# Patient Record
Sex: FEMALE | Race: WHITE | NOT HISPANIC OR LATINO | Employment: UNEMPLOYED | URBAN - METROPOLITAN AREA
[De-identification: names, ages, dates, MRNs, and addresses within clinical notes are randomized per-mention and may not be internally consistent; named-entity substitution may affect disease eponyms.]

---
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
## 2019-03-21 ENCOUNTER — OFFICE VISIT (OUTPATIENT)
Dept: OBSTETRICS AND GYNECOLOGY | Facility: CLINIC | Age: 19
End: 2019-03-21
Payer: COMMERCIAL

## 2019-03-21 VITALS
HEIGHT: 64 IN | WEIGHT: 200.38 LBS | SYSTOLIC BLOOD PRESSURE: 122 MMHG | DIASTOLIC BLOOD PRESSURE: 68 MMHG | BODY MASS INDEX: 34.21 KG/M2

## 2019-03-21 DIAGNOSIS — Z30.09 ENCOUNTER FOR GENERAL COUNSELING AND ADVICE ON CONTRACEPTIVE MANAGEMENT: Primary | ICD-10-CM

## 2019-03-21 PROCEDURE — 3008F BODY MASS INDEX DOCD: CPT | Mod: CPTII,S$GLB,, | Performed by: SPECIALIST

## 2019-03-21 PROCEDURE — 99213 OFFICE O/P EST LOW 20 MIN: CPT | Mod: S$GLB,,, | Performed by: SPECIALIST

## 2019-03-21 PROCEDURE — 99999 PR PBB SHADOW E&M-EST. PATIENT-LVL III: CPT | Mod: PBBFAC,,, | Performed by: SPECIALIST

## 2019-03-21 PROCEDURE — 99999 PR PBB SHADOW E&M-EST. PATIENT-LVL III: ICD-10-PCS | Mod: PBBFAC,,, | Performed by: SPECIALIST

## 2019-03-21 PROCEDURE — 99213 PR OFFICE/OUTPT VISIT, EST, LEVL III, 20-29 MIN: ICD-10-PCS | Mod: S$GLB,,, | Performed by: SPECIALIST

## 2019-03-21 PROCEDURE — 3008F PR BODY MASS INDEX (BMI) DOCUMENTED: ICD-10-PCS | Mod: CPTII,S$GLB,, | Performed by: SPECIALIST

## 2019-03-21 RX ORDER — NORETHINDRONE ACETATE AND ETHINYL ESTRADIOL .02; 1 MG/1; MG/1
1 TABLET ORAL DAILY
Qty: 21 TABLET | Refills: 11 | Status: SHIPPED | OUTPATIENT
Start: 2019-03-21 | End: 2019-08-07 | Stop reason: SDUPTHER

## 2019-06-20 ENCOUNTER — HOSPITAL ENCOUNTER (OUTPATIENT)
Dept: RADIOLOGY | Facility: HOSPITAL | Age: 19
Discharge: HOME | End: 2019-06-20
Attending: ORTHOPAEDIC SURGERY
Payer: COMMERCIAL

## 2019-06-20 ENCOUNTER — OFFICE VISIT (OUTPATIENT)
Dept: ORTHOPEDICS | Facility: CLINIC | Age: 19
End: 2019-06-20
Payer: COMMERCIAL

## 2019-06-20 VITALS
BODY MASS INDEX: 33.32 KG/M2 | DIASTOLIC BLOOD PRESSURE: 68 MMHG | HEIGHT: 65 IN | WEIGHT: 200 LBS | SYSTOLIC BLOOD PRESSURE: 119 MMHG | HEART RATE: 80 BPM

## 2019-06-20 DIAGNOSIS — S82.64XA TRAUMATIC CLOSED NONDISPLACED FRACTURE OF LATERAL MALLEOLUS, RIGHT, INITIAL ENCOUNTER: Primary | ICD-10-CM

## 2019-06-20 DIAGNOSIS — M25.571 ACUTE RIGHT ANKLE PAIN: ICD-10-CM

## 2019-06-20 DIAGNOSIS — M25.571 ACUTE RIGHT ANKLE PAIN: Primary | ICD-10-CM

## 2019-06-20 PROCEDURE — 3008F BODY MASS INDEX DOCD: CPT | Mod: CPTII,S$GLB,, | Performed by: ORTHOPAEDIC SURGERY

## 2019-06-20 PROCEDURE — 99999 PR PBB SHADOW E&M-EST. PATIENT-LVL III: CPT | Mod: PBBFAC,,, | Performed by: ORTHOPAEDIC SURGERY

## 2019-06-20 PROCEDURE — 73610 X-RAY EXAM OF ANKLE: CPT | Mod: TC,PN,RT

## 2019-06-20 PROCEDURE — 27786 PR CLOSED RX DIST FIBULA FX: ICD-10-PCS | Mod: RT,S$GLB,, | Performed by: ORTHOPAEDIC SURGERY

## 2019-06-20 PROCEDURE — 27786 TREATMENT OF ANKLE FRACTURE: CPT | Mod: RT,S$GLB,, | Performed by: ORTHOPAEDIC SURGERY

## 2019-06-20 PROCEDURE — 3008F PR BODY MASS INDEX (BMI) DOCUMENTED: ICD-10-PCS | Mod: CPTII,S$GLB,, | Performed by: ORTHOPAEDIC SURGERY

## 2019-06-20 PROCEDURE — 99204 PR OFFICE/OUTPT VISIT, NEW, LEVL IV, 45-59 MIN: ICD-10-PCS | Mod: 57,S$GLB,, | Performed by: ORTHOPAEDIC SURGERY

## 2019-06-20 PROCEDURE — 73610 XR ANKLE COMPLETE 3 VIEW RIGHT: ICD-10-PCS | Mod: 26,RT,, | Performed by: RADIOLOGY

## 2019-06-20 PROCEDURE — 99999 PR PBB SHADOW E&M-EST. PATIENT-LVL III: ICD-10-PCS | Mod: PBBFAC,,, | Performed by: ORTHOPAEDIC SURGERY

## 2019-06-20 PROCEDURE — 73610 X-RAY EXAM OF ANKLE: CPT | Mod: 26,RT,, | Performed by: RADIOLOGY

## 2019-06-20 PROCEDURE — 99204 OFFICE O/P NEW MOD 45 MIN: CPT | Mod: 57,S$GLB,, | Performed by: ORTHOPAEDIC SURGERY

## 2019-06-20 NOTE — PATIENT INSTRUCTIONS
Fiberglass Cast Care    It may take up to 2 hours for the fiberglass to get completely hard. Dont put any weight on the cast during that time or it may break.  To prevent swelling under the cast, do the following for the first 2 days (48 hours):  · If the cast is on your arm: Keep it in a sling or raised to shoulder level when you are sitting or standing. Rest it on your chest or on a pillow at your side when lying down. Keep the cast above the level of your heart.  · If the cast is on your leg: Keep it propped up above the level of your hip when you are sitting or lying down. Sleep with the cast raised on a pillow. Avoid crutch walking as much as possible during this time.  Keep the cast completely dry at all times. Bathe with your cast well out of the water. Protect it with a large plastic bag kept in place with rubber bands. If your cast does get wet, use a hair dryer to warm the cast and speed up the drying process. A wet cast may cause skin problems.  Dont put creams or objects under the cast if you have itching.  Follow-up care  Follow up with your healthcare provider, or as advised.  When to seek medical advice  Call your healthcare provider right away if any of these occur:  · The cast cracks  · The cast and padding get wet and stay wet for more than a day (24 hours)  · Bad odor from the cast or wound fluid stains the cast  · Tightness or pressure under the cast gets worse  · Fingers or toes become swollen, cold, blue, numb, or tingly  · You cant move your toes or fingers  · Pain under the cast gets worse or you feel a burning sensation  · Skin around the cast becomes red  · Fever of 100.4ºF (38ºC) or higher, or as directed by your healthcare provider   Date Last Reviewed: 2/1/2017  © 2850-2175 [employer]. [address] [place] [address], [place] [address]. All rights reserved. This information is not intended as a substitute for professional medical care. Always follow your healthcare  professional's instructions.
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
## 2019-07-09 DIAGNOSIS — S82.64XA TRAUMATIC CLOSED NONDISPLACED FRACTURE OF LATERAL MALLEOLUS, RIGHT, INITIAL ENCOUNTER: Primary | ICD-10-CM

## 2019-07-11 ENCOUNTER — OFFICE VISIT (OUTPATIENT)
Dept: ORTHOPEDICS | Facility: CLINIC | Age: 19
End: 2019-07-11
Payer: COMMERCIAL

## 2019-07-11 ENCOUNTER — HOSPITAL ENCOUNTER (OUTPATIENT)
Dept: RADIOLOGY | Facility: HOSPITAL | Age: 19
Discharge: HOME | End: 2019-07-11
Attending: ORTHOPAEDIC SURGERY
Payer: COMMERCIAL

## 2019-07-11 VITALS
HEIGHT: 65 IN | BODY MASS INDEX: 33.32 KG/M2 | HEART RATE: 89 BPM | WEIGHT: 200 LBS | DIASTOLIC BLOOD PRESSURE: 71 MMHG | SYSTOLIC BLOOD PRESSURE: 122 MMHG

## 2019-07-11 DIAGNOSIS — S82.64XA TRAUMATIC CLOSED NONDISPLACED FRACTURE OF LATERAL MALLEOLUS, RIGHT, INITIAL ENCOUNTER: ICD-10-CM

## 2019-07-11 DIAGNOSIS — S82.64XD CLOSED NONDISPLACED FRACTURE OF LATERAL MALLEOLUS OF RIGHT FIBULA WITH ROUTINE HEALING: Primary | ICD-10-CM

## 2019-07-11 PROCEDURE — 99999 PR PBB SHADOW E&M-EST. PATIENT-LVL III: ICD-10-PCS | Mod: PBBFAC,,, | Performed by: ORTHOPAEDIC SURGERY

## 2019-07-11 PROCEDURE — 73610 X-RAY EXAM OF ANKLE: CPT | Mod: TC,PN,RT

## 2019-07-11 PROCEDURE — 73610 X-RAY EXAM OF ANKLE: CPT | Mod: 26,RT,, | Performed by: RADIOLOGY

## 2019-07-11 PROCEDURE — 99999 PR PBB SHADOW E&M-EST. PATIENT-LVL III: CPT | Mod: PBBFAC,,, | Performed by: ORTHOPAEDIC SURGERY

## 2019-07-11 PROCEDURE — 73610 XR ANKLE COMPLETE 3 VIEW RIGHT: ICD-10-PCS | Mod: 26,RT,, | Performed by: RADIOLOGY

## 2019-07-11 PROCEDURE — 99024 POSTOP FOLLOW-UP VISIT: CPT | Mod: S$GLB,,, | Performed by: ORTHOPAEDIC SURGERY

## 2019-07-11 PROCEDURE — 99024 PR POST-OP FOLLOW-UP VISIT: ICD-10-PCS | Mod: S$GLB,,, | Performed by: ORTHOPAEDIC SURGERY

## 2019-07-11 NOTE — PROGRESS NOTES
CC:  18-year-old female follows up with a right lateral malleolus fracture. Date of injury was 06/19/2019.  Last visit we placed her in a cast and made her nonweightbearing.    RLE:  Cast removed for x-rays.  Skin changes consistent with casting noted. Intact motor sensory function distally.  Plus two distal pulses.  She is  to palpation of the distal fibula.  Swelling is improved dramatically.    X-rays were examined and personally reviewed by me.  Three views of the right ankle dated 07/11/2019 are available for review.  There is a healing fracture of the distal fibula.  It is a low Puente A.    Dx:  Fracture of the right lateral malleolus, healing    Plan:  Will transition the patient to a walking boot she can follow up in 3 weeks.

## 2019-07-31 DIAGNOSIS — S82.64XD CLOSED NONDISPLACED FRACTURE OF LATERAL MALLEOLUS OF RIGHT FIBULA WITH ROUTINE HEALING: Primary | ICD-10-CM

## 2019-08-01 ENCOUNTER — HOSPITAL ENCOUNTER (OUTPATIENT)
Dept: RADIOLOGY | Facility: HOSPITAL | Age: 19
Discharge: HOME | End: 2019-08-01
Attending: ORTHOPAEDIC SURGERY
Payer: COMMERCIAL

## 2019-08-01 ENCOUNTER — OFFICE VISIT (OUTPATIENT)
Dept: ORTHOPEDICS | Facility: CLINIC | Age: 19
End: 2019-08-01
Payer: COMMERCIAL

## 2019-08-01 VITALS
HEART RATE: 86 BPM | WEIGHT: 200 LBS | HEIGHT: 65 IN | DIASTOLIC BLOOD PRESSURE: 68 MMHG | SYSTOLIC BLOOD PRESSURE: 125 MMHG | BODY MASS INDEX: 33.32 KG/M2

## 2019-08-01 DIAGNOSIS — S82.64XD CLOSED NONDISPLACED FRACTURE OF LATERAL MALLEOLUS OF RIGHT FIBULA WITH ROUTINE HEALING: ICD-10-CM

## 2019-08-01 DIAGNOSIS — S82.64XD CLOSED NONDISPLACED FRACTURE OF LATERAL MALLEOLUS OF RIGHT FIBULA WITH ROUTINE HEALING: Primary | ICD-10-CM

## 2019-08-01 PROCEDURE — 99999 PR PBB SHADOW E&M-EST. PATIENT-LVL III: CPT | Mod: PBBFAC,,, | Performed by: ORTHOPAEDIC SURGERY

## 2019-08-01 PROCEDURE — 73610 X-RAY EXAM OF ANKLE: CPT | Mod: 26,RT,, | Performed by: RADIOLOGY

## 2019-08-01 PROCEDURE — 99024 PR POST-OP FOLLOW-UP VISIT: ICD-10-PCS | Mod: S$GLB,,, | Performed by: ORTHOPAEDIC SURGERY

## 2019-08-01 PROCEDURE — 73610 XR ANKLE COMPLETE 3 VIEW RIGHT: ICD-10-PCS | Mod: 26,RT,, | Performed by: RADIOLOGY

## 2019-08-01 PROCEDURE — 99999 PR PBB SHADOW E&M-EST. PATIENT-LVL III: ICD-10-PCS | Mod: PBBFAC,,, | Performed by: ORTHOPAEDIC SURGERY

## 2019-08-01 PROCEDURE — 73610 X-RAY EXAM OF ANKLE: CPT | Mod: TC,PN,RT

## 2019-08-01 PROCEDURE — 99024 POSTOP FOLLOW-UP VISIT: CPT | Mod: S$GLB,,, | Performed by: ORTHOPAEDIC SURGERY

## 2019-08-01 NOTE — PROGRESS NOTES
CC:  18-year-old female follows up with a closed nondisplaced fracture of her right lateral malleolus.  Date of her injury was 06/19/2019.  She has been in a walking boot for the last 3 weeks.    RLE:  Skin is clean, dry, intact. Motor functions intact EHL, FHL, TA, gastroc.  Plus two dorsalis pedis posterior tibial pulses. Normal sensation light touch dorsal, plantar, 1st webspace.  There is no tenderness palpation over the lateral malleolus.    X-rays were examined and personally reviewed by me.  There is a healed fracture of the lateral malleolus of the right ankle.    Dx:  Right lateral malleolar fracture, healed    Plan:  Progress activity as tolerated.  Follow-up p.r.n..

## 2019-08-07 RX ORDER — NORETHINDRONE ACETATE AND ETHINYL ESTRADIOL .02; 1 MG/1; MG/1
1 TABLET ORAL DAILY
Qty: 21 TABLET | Refills: 11 | Status: SHIPPED | OUTPATIENT
Start: 2019-08-07 | End: 2020-04-23 | Stop reason: SDUPTHER

## 2019-08-07 NOTE — TELEPHONE ENCOUNTER
----- Message from Taylor Villafana sent at 8/7/2019  1:37 PM CDT -----  Contact: pt  Pt calling states there is no more refills on her birth control,norethindrone-ethinyl estradiol (JUNEL 1/20, 21,) 1-20 mg-mcg per tablet...[phone]         .  [id number] - [place] - [address]  [address] [address]  Phone: [phone] Fax: [phone]

## 2019-12-17 ENCOUNTER — TELEPHONE (OUTPATIENT)
Dept: FAMILY MEDICINE | Facility: CLINIC | Age: 19
End: 2019-12-17

## 2019-12-17 ENCOUNTER — OFFICE VISIT (OUTPATIENT)
Dept: FAMILY MEDICINE | Facility: CLINIC | Age: 19
End: 2019-12-17
Payer: COMMERCIAL

## 2019-12-17 VITALS
HEART RATE: 104 BPM | OXYGEN SATURATION: 98 % | HEIGHT: 65 IN | SYSTOLIC BLOOD PRESSURE: 112 MMHG | TEMPERATURE: 98 F | WEIGHT: 219.13 LBS | BODY MASS INDEX: 36.51 KG/M2 | DIASTOLIC BLOOD PRESSURE: 70 MMHG

## 2019-12-17 DIAGNOSIS — J06.9 UPPER RESPIRATORY TRACT INFECTION, UNSPECIFIED TYPE: Primary | ICD-10-CM

## 2019-12-17 DIAGNOSIS — Z00.00 WELL ADULT EXAM: ICD-10-CM

## 2019-12-17 DIAGNOSIS — D50.0 IRON DEFICIENCY ANEMIA DUE TO CHRONIC BLOOD LOSS: ICD-10-CM

## 2019-12-17 LAB
CTP QC/QA: YES
FLUAV AG NPH QL: NEGATIVE
FLUBV AG NPH QL: NEGATIVE

## 2019-12-17 PROCEDURE — 3008F BODY MASS INDEX DOCD: CPT | Mod: S$GLB,,, | Performed by: FAMILY MEDICINE

## 2019-12-17 PROCEDURE — 99213 PR OFFICE/OUTPT VISIT, EST, LEVL III, 20-29 MIN: ICD-10-PCS | Mod: 25,S$GLB,, | Performed by: FAMILY MEDICINE

## 2019-12-17 PROCEDURE — 99213 OFFICE O/P EST LOW 20 MIN: CPT | Mod: 25,S$GLB,, | Performed by: FAMILY MEDICINE

## 2019-12-17 PROCEDURE — 87804 INFLUENZA ASSAY W/OPTIC: CPT | Mod: QW,S$GLB,, | Performed by: FAMILY MEDICINE

## 2019-12-17 PROCEDURE — 87804 POCT INFLUENZA A/B: ICD-10-PCS | Mod: QW,S$GLB,, | Performed by: FAMILY MEDICINE

## 2019-12-17 PROCEDURE — 3008F PR BODY MASS INDEX (BMI) DOCUMENTED: ICD-10-PCS | Mod: S$GLB,,, | Performed by: FAMILY MEDICINE

## 2019-12-17 RX ORDER — AZITHROMYCIN 250 MG/1
250 TABLET, FILM COATED ORAL DAILY
Qty: 6 TABLET | Refills: 0 | Status: SHIPPED | OUTPATIENT
Start: 2019-12-17 | End: 2019-12-23

## 2019-12-17 RX ORDER — PROMETHAZINE HYDROCHLORIDE AND DEXTROMETHORPHAN HYDROBROMIDE 6.25; 15 MG/5ML; MG/5ML
5 SYRUP ORAL NIGHTLY
Qty: 180 ML | Refills: 2 | Status: SHIPPED | OUTPATIENT
Start: 2019-12-17 | End: 2019-12-27

## 2019-12-17 NOTE — LETTER
December 17, 2019      [facility]  [address]  [facility] [address]  Phone: [phone]  Fax: [phone]       Patient: Ricardo Rivera   YOB: 2000  Date of Visit: 12/17/2019    To Whom It May Concern:    Pierre Rivera  was at [facility] on 12/17/2019. She may return to work/school on 12/18/19 with no restrictions. If you have any questions or concerns, or if I can be of further assistance, please do not hesitate to contact me.    Sincerely,        Electronically signed by Alok Angeles M.D.  Laurel Ramirez MA

## 2019-12-17 NOTE — PROGRESS NOTES
4-5 day of sore throat, progressive symptoms  Subjective:       Patient ID: Ricardo Rivera is a 19 y.o. female.    Chief Complaint: Cough (x4-5 days ) and Nasal Congestion      Cough   This is a chronic problem. The current episode started in the past 7 days (4-5 dd). The problem has been gradually worsening. The cough is non-productive. Associated symptoms include ear congestion, ear pain, nasal congestion, postnasal drip and a sore throat. Pertinent negatives include no chills or hemoptysis. The symptoms are aggravated by dust. The treatment provided no relief. Her past medical history is significant for asthma (childhood RAD). There is no history of COPD.       Allergies and Medications:   Review of patient's allergies indicates:  No Known Allergies  Current Outpatient Medications   Medication Sig Dispense Refill    norethindrone-ethinyl estradiol (JUNEL 1/20, 21,) 1-20 mg-mcg per tablet Take 1 tablet by mouth once daily. 21 tablet 11    azithromycin (Z-ALEXANDER) 250 MG tablet Take 1 tablet (250 mg total) by mouth once daily. po on day 1 then 1 tab po on days 2-5 for 6 doses 6 tablet 0    fluoxetine 10 MG Tab Take 10 mg by mouth once daily.      promethazine-dextromethorphan (PROMETHAZINE-DM) 6.25-15 mg/5 mL Syrp Take 5 mLs by mouth every evening. for 10 days 180 mL 2     No current facility-administered medications for this visit.        Family History:   Family History   Problem Relation Age of Onset    No Known Problems Mother     No Known Problems Father     Breast cancer Neg Hx     Ovarian cancer Neg Hx        Social History:   Social History     Socioeconomic History    Marital status: Single     Spouse name: Not on file    Number of children: Not on file    Years of education: Not on file    Highest education level: Not on file   Occupational History    Not on file   Social Needs    Financial resource strain: Not on file    Food insecurity:     Worry: Not on file     Inability: Not on  file    Transportation needs:     Medical: Not on file     Non-medical: Not on file   Tobacco Use    Smoking status: Never Smoker    Smokeless tobacco: Never Used   Substance and Sexual Activity    Alcohol use: No    Drug use: No    Sexual activity: Yes     Birth control/protection: OCP   Lifestyle    Physical activity:     Days per week: Not on file     Minutes per session: Not on file    Stress: To some extent   Relationships    Social connections:     Talks on phone: Not on file     Gets together: Not on file     Attends Jainism service: Not on file     Active member of club or organization: Not on file     Attends meetings of clubs or organizations: Not on file     Relationship status: Not on file   Other Topics Concern    Not on file   Social History Narrative    Not on file       Review of Systems   Constitutional: Negative for chills.   HENT: Positive for ear pain, postnasal drip and sore throat.    Respiratory: Positive for cough. Negative for hemoptysis.        Objective:     Vitals:    12/17/19 0859   BP: 112/70   Pulse: 104   Temp: 97.7 °F (36.5 °C)        Physical Exam   Constitutional: She appears well-developed and well-nourished. No distress.   HENT:   Head: Normocephalic and atraumatic.   Right Ear: Hearing, tympanic membrane, external ear and ear canal normal. No drainage, swelling or tenderness. No foreign bodies. Tympanic membrane is not injected, not scarred, not perforated, not erythematous, not retracted and not bulging. Tympanic membrane mobility is normal. No middle ear effusion. No hemotympanum. No decreased hearing is noted.   Left Ear: Hearing, tympanic membrane, external ear and ear canal normal. No drainage, swelling or tenderness. No foreign bodies. Tympanic membrane is not injected, not scarred, not perforated, not erythematous, not retracted and not bulging. Tympanic membrane mobility is normal.  No middle ear effusion. No hemotympanum. No decreased hearing is noted.    Nose: Nose normal. No mucosal edema, rhinorrhea, nose lacerations, sinus tenderness, nasal deformity or septal deviation. Right sinus exhibits no maxillary sinus tenderness and no frontal sinus tenderness. Left sinus exhibits no maxillary sinus tenderness and no frontal sinus tenderness.   Mouth/Throat: Uvula is midline and oropharynx is clear and moist. Normal dentition. No oropharyngeal exudate, posterior oropharyngeal edema, posterior oropharyngeal erythema or tonsillar abscesses.   Eyes: Pupils are equal, round, and reactive to light. Conjunctivae and EOM are normal. Right eye exhibits no discharge. Left eye exhibits no discharge. No scleral icterus.   Neck: Normal range of motion. Neck supple. No thyromegaly present.   Cardiovascular: Normal rate, regular rhythm, normal heart sounds and intact distal pulses. Exam reveals no gallop and no friction rub.   No murmur heard.  Pulmonary/Chest: Effort normal. No stridor. No respiratory distress. She has wheezes ( Course tracheal and upper bronchial congestion clears on coughing.). She has no rales. She exhibits no tenderness.   Lymphadenopathy:     She has no cervical adenopathy.   Skin: She is not diaphoretic.   Nursing note and vitals reviewed.    p.o. CT flu test a and B both negative  Assessment:       1. Upper respiratory tract infection, unspecified type    2. Well adult exam    3. Iron deficiency anemia due to chronic blood loss        Plan:       Ricardo was seen today for cough and nasal congestion.    Diagnoses and all orders for this visit:    Upper respiratory tract infection, unspecified type  -     POCT Influenza A/B  -     promethazine-dextromethorphan (PROMETHAZINE-DM) 6.25-15 mg/5 mL Syrp; Take 5 mLs by mouth every evening. for 10 days  -     azithromycin (Z-ALEXANDER) 250 MG tablet; Take 1 tablet (250 mg total) by mouth once daily. po on day 1 then 1 tab po on days 2-5 for 6 doses    Well adult exam  -     Comprehensive metabolic panel; Future  -      Lipid panel; Future  -     Comprehensive metabolic panel  -     Lipid panel    Iron deficiency anemia due to chronic blood loss  -     CBC auto differential; Future         Follow up if symptoms worsen or fail to improve, for annual 1st available..

## 2019-12-17 NOTE — TELEPHONE ENCOUNTER
Pt's pharmacy is currently out of the recently prescribed promethazine DM cough syrup. Is there another that can be ordered in place of this?

## 2020-03-23 ENCOUNTER — HOSPITAL ENCOUNTER (EMERGENCY)
Facility: HOSPITAL | Age: 20
Discharge: HOME | End: 2020-03-23
Attending: EMERGENCY MEDICINE
Payer: COMMERCIAL

## 2020-03-23 VITALS
HEIGHT: 65 IN | RESPIRATION RATE: 16 BRPM | BODY MASS INDEX: 4.33 KG/M2 | DIASTOLIC BLOOD PRESSURE: 68 MMHG | TEMPERATURE: 98 F | SYSTOLIC BLOOD PRESSURE: 135 MMHG | WEIGHT: 26 LBS | HEART RATE: 96 BPM | OXYGEN SATURATION: 98 %

## 2020-03-23 DIAGNOSIS — R07.89 ANTERIOR CHEST WALL PAIN: ICD-10-CM

## 2020-03-23 PROBLEM — Z00.00 WELL ADULT EXAM: Status: RESOLVED | Noted: 2019-12-17 | Resolved: 2020-03-23

## 2020-03-23 LAB
B-HCG UR QL: NEGATIVE
CTP QC/QA: YES

## 2020-03-23 PROCEDURE — 25000003 PHARM REV CODE 250: Performed by: EMERGENCY MEDICINE

## 2020-03-23 PROCEDURE — 99284 EMERGENCY DEPT VISIT MOD MDM: CPT | Mod: 25

## 2020-03-23 PROCEDURE — 81025 URINE PREGNANCY TEST: CPT | Performed by: EMERGENCY MEDICINE

## 2020-03-23 RX ORDER — IBUPROFEN 600 MG/1
600 TABLET ORAL EVERY 6 HOURS PRN
Qty: 20 TABLET | Refills: 0 | Status: SHIPPED | OUTPATIENT
Start: 2020-03-23 | End: 2021-01-14

## 2020-03-23 RX ADMIN — IBUPROFEN 600 MG: 400 TABLET ORAL at 01:03

## 2020-03-23 NOTE — DISCHARGE INSTRUCTIONS
Your chest x-ray here today did not show any obvious cause of the discomfort they are having.  Please take the medications as prescribed.  If your symptoms worsen despite treatment please come back to the ER for repeat care.

## 2020-03-23 NOTE — ED PROVIDER NOTES
Encounter Date: 3/23/2020       History     Chief Complaint   Patient presents with    pain left arm and breast     HPI   19-year-old female with no significant past medical history presents to the ER with left anterior chest pain.  Patient states the pain started earlier today prior to arrival. Pt describes a sharp/aching sensation in her Upper left chest that radiates to her armpit. Pt denies sob, fevers, cough, nausea, vomiting, numbness, tingling or weakness distal. Pt states that moving her arm and pressing on the area makes the pain worse. Pt states that she took 500mg of tylenol prior to arrival.   Review of patient's allergies indicates:  No Known Allergies  Past Medical History:   Diagnosis Date    Iron deficiency anemia 2/27/2018     No past surgical history on file.  Family History   Problem Relation Age of Onset    No Known Problems Mother     No Known Problems Father     Breast cancer Neg Hx     Ovarian cancer Neg Hx      Social History     Tobacco Use    Smoking status: Never Smoker    Smokeless tobacco: Never Used   Substance Use Topics    Alcohol use: No    Drug use: No     Review of Systems   Constitutional: Negative for appetite change, chills, diaphoresis and fever.   HENT: Negative for ear pain, rhinorrhea, sore throat, trouble swallowing and voice change.    Eyes: Negative for pain, discharge, redness and visual disturbance.   Respiratory: Negative for cough, chest tightness and shortness of breath.    Cardiovascular: Negative for chest pain and leg swelling.   Gastrointestinal: Negative for abdominal pain, constipation, diarrhea, nausea and vomiting.   Genitourinary: Negative for difficulty urinating, dysuria, flank pain, frequency and urgency.   Musculoskeletal: Positive for arthralgias. Negative for back pain and myalgias.   Skin: Negative for rash.   Neurological: Negative for dizziness, syncope, speech difficulty, weakness, light-headedness, numbness and headaches.       Physical  Exam     Initial Vitals [03/23/20 0034]   BP Pulse Resp Temp SpO2   135/68 96 16 98.4 °F (36.9 °C) 98 %      MAP       --         Physical Exam    Nursing note and vitals reviewed.  Constitutional: She appears well-developed and well-nourished. She is not diaphoretic. She is cooperative.  Non-toxic appearance. She does not have a sickly appearance. She does not appear ill. No distress.   HENT:   Head: Normocephalic and atraumatic. Head is without abrasion, without right periorbital erythema and without left periorbital erythema.   Right Ear: Hearing and external ear normal. No drainage or tenderness.   Left Ear: Hearing and external ear normal. No drainage or tenderness.   Nose: No rhinorrhea, sinus tenderness or nasal septal hematoma. No epistaxis.  No foreign bodies. Right sinus exhibits no frontal sinus tenderness. Left sinus exhibits no frontal sinus tenderness.   Mouth/Throat: Uvula is midline and mucous membranes are normal. No oral lesions. No trismus in the jaw. No dental abscesses or uvula swelling. No oropharyngeal exudate, posterior oropharyngeal edema, posterior oropharyngeal erythema or tonsillar abscesses.   Eyes: Lids are normal. Pupils are equal, round, and reactive to light. Right eye exhibits no chemosis, no discharge and no exudate. Left eye exhibits no chemosis, no discharge and no exudate. Right conjunctiva is not injected. Right conjunctiva has no hemorrhage. Left conjunctiva is not injected. Left conjunctiva has no hemorrhage. No scleral icterus. Right eye exhibits normal extraocular motion. Left eye exhibits normal extraocular motion.   Neck: Trachea normal and normal range of motion. Neck supple. No stridor present. No spinous process tenderness and no muscular tenderness present. No tracheal deviation and no edema present. No neck rigidity. No JVD present.   Cardiovascular: Regular rhythm, normal heart sounds and normal pulses.   Pulmonary/Chest: Breath sounds normal.   Abdominal: Soft.  Bowel sounds are normal. She exhibits no distension, no ascites and no mass. There is no hepatosplenomegaly. There is no tenderness. There is no rigidity, no rebound, no guarding and no CVA tenderness. Hernia confirmed negative in the ventral area.   Musculoskeletal: Normal range of motion.   Lymphadenopathy:     She has no cervical adenopathy.   Neurological: She is alert. She has normal strength. No cranial nerve deficit or sensory deficit.   Skin: Skin is warm. Capillary refill takes less than 2 seconds. No ecchymosis, no lesion and no rash noted. No erythema.   Psychiatric: She has a normal mood and affect. Her speech is normal and behavior is normal. Judgment and thought content normal.         ED Course   Procedures  Labs Reviewed   POCT URINE PREGNANCY          Imaging Results          X-Ray Chest PA And Lateral (In process)                                    ED Course as of Mar 23 0234   Mon Mar 23, 2020   0134 Patient with improvement of symptoms at time of discharge    [MP]      ED Course User Index  [MP] Chris Morris MD                Clinical Impression:       ICD-10-CM ICD-9-CM   1. Anterior chest wall pain R07.89 786.52             ED Disposition Condition    Discharge Stable        ED Prescriptions     Medication Sig Dispense Start Date End Date Auth. Provider    ibuprofen (ADVIL,MOTRIN) 600 MG tablet Take 1 tablet (600 mg total) by mouth every 6 (six) hours as needed for Pain. 20 tablet 3/23/2020  Chris Morris MD        Follow-up Information     Follow up With Specialties Details Why Contact Info    Alok Angeles MD Family Medicine Schedule an appointment as soon as possible for a visit in 2 days  [address] [place] [address]  [address]  [facility] [address]  [phone]                                       Chris Morris MD  03/23/20 023

## 2020-04-23 ENCOUNTER — OFFICE VISIT (OUTPATIENT)
Dept: OBSTETRICS AND GYNECOLOGY | Facility: CLINIC | Age: 20
End: 2020-04-23
Payer: COMMERCIAL

## 2020-04-23 DIAGNOSIS — Z30.011 ENCOUNTER FOR INITIAL PRESCRIPTION OF CONTRACEPTIVE PILLS: Primary | ICD-10-CM

## 2020-04-23 DIAGNOSIS — Z71.89 ADVICE GIVEN ABOUT COVID-19 VIRUS BY TELEPHONE: ICD-10-CM

## 2020-04-23 PROCEDURE — 99213 OFFICE O/P EST LOW 20 MIN: CPT | Mod: 95,,, | Performed by: SPECIALIST

## 2020-04-23 PROCEDURE — 99213 PR OFFICE/OUTPT VISIT, EST, LEVL III, 20-29 MIN: ICD-10-PCS | Mod: 95,,, | Performed by: SPECIALIST

## 2020-04-23 RX ORDER — NORETHINDRONE ACETATE AND ETHINYL ESTRADIOL .02; 1 MG/1; MG/1
1 TABLET ORAL DAILY
Qty: 21 TABLET | Refills: 12 | Status: SHIPPED | OUTPATIENT
Start: 2020-04-23 | End: 2020-11-19

## 2020-04-23 NOTE — PROGRESS NOTES
Established Patient - Audio Only Telehealth Visit     The patient location is: Home  The chief complaint leading to consultation is: Contraceptive management  Visit type: Virtual visit with audio only (telephone)     The reason for the audio only service rather than synchronous audio and video virtual visit was related to technical difficulties or patient preference/necessity.     Each patient to whom I provide medical services by telemedicine is:  (1) informed of the relationship between the physician and patient and the respective role of any other health care provider with respect to management of the patient; and (2) notified that they may decline to receive medical services by telemedicine and may withdraw from such care at any time. Patient verbally consented to receive this service via voice-only telephone call.       HPI: 20 yo WF currently on OCP. Pt takes on continuous basis to avoid dysmenorrhea . Pt has been amenorrheic as intended by continuous therapy  Pt denies side effects and inquires as to whether she may continue current regimen. I discussed purpose of continuous dosing and ok to continue.  COVID precautions discussed.   Discussed physiologic d/c as well as pt instructed to present to office for odor, itching, burning      Assessment and plan:  Contraceptive management                                        Will refill OCPs x 1                         This service was not originating from a related E/M service provided within the previous 7 days nor will  to an E/M service or procedure within the next 24 hours or my soonest available appointment.  Prevailing standard of care was able to be met in this audio-only visit.

## 2020-04-27 RX ORDER — NORETHINDRONE ACETATE AND ETHINYL ESTRADIOL 1; 20 MG/1; UG/1
TABLET ORAL
Qty: 21 TABLET | Refills: 11 | Status: SHIPPED | OUTPATIENT
Start: 2020-04-27 | End: 2020-11-19

## 2020-11-19 ENCOUNTER — OFFICE VISIT (OUTPATIENT)
Dept: OBSTETRICS AND GYNECOLOGY | Facility: CLINIC | Age: 20
End: 2020-11-19
Payer: COMMERCIAL

## 2020-11-19 ENCOUNTER — LAB VISIT (OUTPATIENT)
Dept: LAB | Facility: HOSPITAL | Age: 20
End: 2020-11-19
Attending: SPECIALIST
Payer: COMMERCIAL

## 2020-11-19 VITALS
WEIGHT: 232.13 LBS | HEIGHT: 65 IN | BODY MASS INDEX: 38.67 KG/M2 | SYSTOLIC BLOOD PRESSURE: 112 MMHG | DIASTOLIC BLOOD PRESSURE: 64 MMHG

## 2020-11-19 DIAGNOSIS — Z34.90 EARLY STAGE OF PREGNANCY: ICD-10-CM

## 2020-11-19 DIAGNOSIS — N91.0 DELAYED MENSES: ICD-10-CM

## 2020-11-19 DIAGNOSIS — Z34.90 EARLY STAGE OF PREGNANCY: Primary | ICD-10-CM

## 2020-11-19 DIAGNOSIS — Z11.3 SCREEN FOR STD (SEXUALLY TRANSMITTED DISEASE): ICD-10-CM

## 2020-11-19 DIAGNOSIS — Z12.4 ENCOUNTER FOR PAP SMEAR OF CERVIX WITH HPV DNA COTESTING: ICD-10-CM

## 2020-11-19 LAB
B-HCG UR QL: POSITIVE
BASOPHILS # BLD AUTO: 0.05 K/UL (ref 0–0.2)
BASOPHILS NFR BLD: 0.5 % (ref 0–1.9)
CTP QC/QA: YES
DIFFERENTIAL METHOD: ABNORMAL
EOSINOPHIL # BLD AUTO: 0.1 K/UL (ref 0–0.5)
EOSINOPHIL NFR BLD: 1.3 % (ref 0–8)
ERYTHROCYTE [DISTWIDTH] IN BLOOD BY AUTOMATED COUNT: 14.6 % (ref 11.5–14.5)
HCT VFR BLD AUTO: 32.7 % (ref 37–48.5)
HGB BLD-MCNC: 9.6 G/DL (ref 12–16)
IMM GRANULOCYTES # BLD AUTO: 0.03 K/UL (ref 0–0.04)
IMM GRANULOCYTES NFR BLD AUTO: 0.3 % (ref 0–0.5)
LYMPHOCYTES # BLD AUTO: 2.7 K/UL (ref 1–4.8)
LYMPHOCYTES NFR BLD: 27.3 % (ref 18–48)
MCH RBC QN AUTO: 22.4 PG (ref 27–31)
MCHC RBC AUTO-ENTMCNC: 29.4 G/DL (ref 32–36)
MCV RBC AUTO: 76 FL (ref 82–98)
MONOCYTES # BLD AUTO: 0.7 K/UL (ref 0.3–1)
MONOCYTES NFR BLD: 7.3 % (ref 4–15)
NEUTROPHILS # BLD AUTO: 6.2 K/UL (ref 1.8–7.7)
NEUTROPHILS NFR BLD: 63.3 % (ref 38–73)
NRBC BLD-RTO: 0 /100 WBC
PLATELET # BLD AUTO: 484 K/UL (ref 150–350)
PMV BLD AUTO: 9.9 FL (ref 9.2–12.9)
RBC # BLD AUTO: 4.29 M/UL (ref 4–5.4)
TSH SERPL DL<=0.005 MIU/L-ACNC: 3.13 UIU/ML (ref 0.4–4)
WBC # BLD AUTO: 9.83 K/UL (ref 3.9–12.7)

## 2020-11-19 PROCEDURE — 84443 ASSAY THYROID STIM HORMONE: CPT

## 2020-11-19 PROCEDURE — 3008F PR BODY MASS INDEX (BMI) DOCUMENTED: ICD-10-PCS | Mod: CPTII,S$GLB,, | Performed by: SPECIALIST

## 2020-11-19 PROCEDURE — 86703 HIV-1/HIV-2 1 RESULT ANTBDY: CPT

## 2020-11-19 PROCEDURE — 1126F PR PAIN SEVERITY QUANTIFIED, NO PAIN PRESENT: ICD-10-PCS | Mod: S$GLB,,, | Performed by: SPECIALIST

## 2020-11-19 PROCEDURE — 87340 HEPATITIS B SURFACE AG IA: CPT

## 2020-11-19 PROCEDURE — 99999 PR PBB SHADOW E&M-EST. PATIENT-LVL III: ICD-10-PCS | Mod: PBBFAC,,, | Performed by: SPECIALIST

## 2020-11-19 PROCEDURE — 1126F AMNT PAIN NOTED NONE PRSNT: CPT | Mod: S$GLB,,, | Performed by: SPECIALIST

## 2020-11-19 PROCEDURE — 0500F INITIAL PRENATAL CARE VISIT: CPT | Mod: S$GLB,,, | Performed by: SPECIALIST

## 2020-11-19 PROCEDURE — 76817 TRANSVAGINAL US OBSTETRIC: CPT | Mod: S$GLB,,, | Performed by: SPECIALIST

## 2020-11-19 PROCEDURE — 85025 COMPLETE CBC W/AUTO DIFF WBC: CPT

## 2020-11-19 PROCEDURE — 99999 PR PBB SHADOW E&M-EST. PATIENT-LVL III: CPT | Mod: PBBFAC,,, | Performed by: SPECIALIST

## 2020-11-19 PROCEDURE — 88175 CYTOPATH C/V AUTO FLUID REDO: CPT

## 2020-11-19 PROCEDURE — 86592 SYPHILIS TEST NON-TREP QUAL: CPT

## 2020-11-19 PROCEDURE — 86762 RUBELLA ANTIBODY: CPT

## 2020-11-19 PROCEDURE — 0500F PR INITIAL PRENATAL CARE VISIT: ICD-10-PCS | Mod: S$GLB,,, | Performed by: SPECIALIST

## 2020-11-19 PROCEDURE — 3008F BODY MASS INDEX DOCD: CPT | Mod: CPTII,S$GLB,, | Performed by: SPECIALIST

## 2020-11-19 PROCEDURE — 87491 CHLMYD TRACH DNA AMP PROBE: CPT

## 2020-11-19 PROCEDURE — 87624 HPV HI-RISK TYP POOLED RSLT: CPT

## 2020-11-19 PROCEDURE — 76817 PR US, OB, TRANSVAG APPROACH: ICD-10-PCS | Mod: S$GLB,,, | Performed by: SPECIALIST

## 2020-11-19 PROCEDURE — 81220 CFTR GENE COM VARIANTS: CPT

## 2020-11-19 PROCEDURE — 86850 RBC ANTIBODY SCREEN: CPT

## 2020-11-19 PROCEDURE — 36415 COLL VENOUS BLD VENIPUNCTURE: CPT | Mod: PO

## 2020-11-19 RX ORDER — DEXTROAMPHETAMINE SACCHARATE, AMPHETAMINE ASPARTATE, DEXTROAMPHETAMINE SULFATE AND AMPHETAMINE SULFATE 5; 5; 5; 5 MG/1; MG/1; MG/1; MG/1
TABLET ORAL
COMMUNITY
Start: 2020-08-25 | End: 2021-01-14

## 2020-11-19 RX ORDER — .BETA.-CAROTENE, ASCORBIC ACID, CHOLECALCIFEROL, .ALPHA.-TOCOPHEROL ACETATE, DL-, THIAMINE MONONITRATE, RIBOFLAVIN, NIACINAMIDE, PYRIDOXINE HYDROCHLORIDE, FOLIC ACID, CYANOCOBALAMIN, BIOTIN, CALCIUM PANTOTHENATE, CALCIUM CARBONATE, FERROUS FUMARATE, POTASSIUM IODIDE, MAGNESIUM OXIDE, ZINC OXIDE AND CUPRIC OXIDE 2600; 75; 450; 10; 3; 3.5; 21; 21; 1; 13; 330; 6; 100; 26; 150; 25; 15; 1.5 [IU]/1; MG/1; [IU]/1; [IU]/1; MG/1; MG/1; MG/1; MG/1; MG/1; UG/1; UG/1; MG/1; MG/1; MG/1; UG/1; MG/1; MG/1; MG/1
1 TABLET, COATED ORAL DAILY
Qty: 100 TABLET | Refills: 3 | Status: ON HOLD | OUTPATIENT
Start: 2020-11-19 | End: 2021-10-27 | Stop reason: HOSPADM

## 2020-11-19 RX ORDER — SERTRALINE HYDROCHLORIDE 50 MG/1
TABLET, FILM COATED ORAL
COMMUNITY
Start: 2020-08-13 | End: 2021-06-16

## 2020-11-19 NOTE — PROCEDURES
Procedures       Procedure TVS 6.5Mhz probe  Positive intrauterine YS. No Fetal pole no free fluid or extrauterine mass

## 2020-11-19 NOTE — PROGRESS NOTES
21 yo WF g0 presents for initial PNC  Unknown LMP but thought to be mid Oct  Past Medical History:   Diagnosis Date    Iron deficiency anemia 2/27/2018       History reviewed. No pertinent surgical history.    Family History   Problem Relation Age of Onset    No Known Problems Mother     No Known Problems Father     Breast cancer Neg Hx     Ovarian cancer Neg Hx        Social History     Socioeconomic History    Marital status: Single     Spouse name: Not on file    Number of children: Not on file    Years of education: Not on file    Highest education level: Not on file   Occupational History    Not on file   Social Needs    Financial resource strain: Not on file    Food insecurity     Worry: Not on file     Inability: Not on file    Transportation needs     Medical: Not on file     Non-medical: Not on file   Tobacco Use    Smoking status: Never Smoker    Smokeless tobacco: Never Used   Substance and Sexual Activity    Alcohol use: No    Drug use: No    Sexual activity: Yes     Birth control/protection: None   Lifestyle    Physical activity     Days per week: Not on file     Minutes per session: Not on file    Stress: To some extent   Relationships    Social connections     Talks on phone: Not on file     Gets together: Not on file     Attends Sabianism service: Not on file     Active member of club or organization: Not on file     Attends meetings of clubs or organizations: Not on file     Relationship status: Not on file   Other Topics Concern    Not on file   Social History Narrative    Not on file       Current Outpatient Medications   Medication Sig Dispense Refill    dextroamphetamine-amphetamine (ADDERALL) 20 mg tablet       fluoxetine 10 MG Tab Take 10 mg by mouth once daily.      ibuprofen (ADVIL,MOTRIN) 600 MG tablet Take 1 tablet (600 mg total) by mouth every 6 (six) hours as needed for Pain. (Patient not taking: Reported on 11/19/2020) 20 tablet 0    sertraline (ZOLOFT) 50 MG  tablet        No current facility-administered medications for this visit.        Review of patient's allergies indicates:  No Known Allergies    Review of System:   General: no chills, fever, night sweats, weight gain or weight loss  Psychological: no depression or suicidal ideation  Breasts: no new or changing breast lumps, nipple discharge or masses.  Respiratory: no cough, shortness of breath, or wheezing  Cardiovascular: no chest pain or dyspnea on exertion  Gastrointestinal: no abdominal pain, change in bowel habits, or black or bloody stools  Genito-Urinary: no incontinence, urinary frequency/urgency or vulvar/vaginal symptoms, pelvic pain or abnormal vaginal bleeding.  Musculoskeletal: no gait disturbance or muscular weakness                                              General Appearance    A and O x 4, Cooperative, no distress   Breasts    Abdomen   Deferred  Soft, non-tender, bowel sounds active all four quadrants,  no masses, no organomegaly    Genitourinary:   External rectal exam shows no thrombosed external hemorrhoids.   Pelvic exam was performed with patient supine.  No labial fusion.  There is no rash, lesion or injury on the right labia.  There is no rash, lesion or injury on the left labia.  No bleeding and no signs of injury around the vaginal introitus, urethra is without lesions and well supported. The cervix is visualized with no discharge, lesions or friability.  No vaginal discharge found.  No significant Cystocele, Enterocele or rectocele, and uterus well supported.  Bimanual exam:  The urethra is normal to palpation and there are no palpable vaginal wall masses.  Uterus is not deviated, not enlarged, not fixed, normal shape and not tender.  Cervix exhibits no motion tenderness.   Right adnexum displays no mass and no tenderness.  Left adnexum displays no mass and no tenderness.   Extremities: Extremities normal, atraumatic, no cyanosis or edema                     Procedure TVS 6.5Mhz  probe  Positive intrauterine YS. No Fetal pole no free fluid or extrauterine mass    I discussed early pregnancy and will obtain PNP  Pt to RTO 3 weeks and will repeat u/s for dating  Answered all questions and will follow  I reviewed pt's past medical history, past and current meds, family history, allergies and reviewed problem list  I spent 20 min with pt with approx half in consultation

## 2020-11-20 LAB
ABO + RH BLD: NORMAL
BLD GP AB SCN CELLS X3 SERPL QL: NORMAL
C TRACH DNA SPEC QL NAA+PROBE: NOT DETECTED
HBV SURFACE AG SERPL QL IA: NEGATIVE
HIV 1+2 AB+HIV1 P24 AG SERPL QL IA: NEGATIVE
N GONORRHOEA DNA SPEC QL NAA+PROBE: NOT DETECTED
RPR SER QL: NORMAL
RUBV IGG SER-ACNC: 32.6 IU/ML
RUBV IGG SER-IMP: REACTIVE

## 2020-11-23 LAB — CFTR MUT ANL BLD/T: NORMAL

## 2020-11-27 LAB
HPV HR 12 DNA SPEC QL NAA+PROBE: NEGATIVE
HPV16 AG SPEC QL: NEGATIVE
HPV18 DNA SPEC QL NAA+PROBE: NEGATIVE

## 2020-12-10 ENCOUNTER — LAB VISIT (OUTPATIENT)
Dept: LAB | Facility: HOSPITAL | Age: 20
End: 2020-12-10
Attending: SPECIALIST
Payer: COMMERCIAL

## 2020-12-10 ENCOUNTER — ROUTINE PRENATAL (OUTPATIENT)
Dept: OBSTETRICS AND GYNECOLOGY | Facility: CLINIC | Age: 20
End: 2020-12-10
Payer: COMMERCIAL

## 2020-12-10 VITALS — SYSTOLIC BLOOD PRESSURE: 110 MMHG | DIASTOLIC BLOOD PRESSURE: 70 MMHG

## 2020-12-10 DIAGNOSIS — Z34.90 EARLY STAGE OF PREGNANCY: ICD-10-CM

## 2020-12-10 DIAGNOSIS — Z34.90 EARLY STAGE OF PREGNANCY: Primary | ICD-10-CM

## 2020-12-10 LAB
ABO + RH BLD: NORMAL
BASOPHILS # BLD AUTO: 0.03 K/UL (ref 0–0.2)
BASOPHILS NFR BLD: 0.3 % (ref 0–1.9)
BILIRUB SERPL-MCNC: NORMAL MG/DL
BLD GP AB SCN CELLS X3 SERPL QL: NORMAL
BLOOD URINE, POC: NORMAL
CLARITY, POC UA: CLEAR
COLOR, POC UA: NORMAL
DIFFERENTIAL METHOD: ABNORMAL
EOSINOPHIL # BLD AUTO: 0.1 K/UL (ref 0–0.5)
EOSINOPHIL NFR BLD: 1.3 % (ref 0–8)
ERYTHROCYTE [DISTWIDTH] IN BLOOD BY AUTOMATED COUNT: 16.1 % (ref 11.5–14.5)
GLUCOSE UR QL STRIP: NORMAL
HCT VFR BLD AUTO: 33.4 % (ref 37–48.5)
HGB BLD-MCNC: 9.6 G/DL (ref 12–16)
IMM GRANULOCYTES # BLD AUTO: 0.04 K/UL (ref 0–0.04)
IMM GRANULOCYTES NFR BLD AUTO: 0.4 % (ref 0–0.5)
KETONES UR QL STRIP: NORMAL
LEUKOCYTE ESTERASE URINE, POC: NORMAL
LYMPHOCYTES # BLD AUTO: 2.5 K/UL (ref 1–4.8)
LYMPHOCYTES NFR BLD: 23.1 % (ref 18–48)
MCH RBC QN AUTO: 21.7 PG (ref 27–31)
MCHC RBC AUTO-ENTMCNC: 28.7 G/DL (ref 32–36)
MCV RBC AUTO: 76 FL (ref 82–98)
MONOCYTES # BLD AUTO: 0.6 K/UL (ref 0.3–1)
MONOCYTES NFR BLD: 5 % (ref 4–15)
NEUTROPHILS # BLD AUTO: 7.7 K/UL (ref 1.8–7.7)
NEUTROPHILS NFR BLD: 69.9 % (ref 38–73)
NITRITE, POC UA: NORMAL
NRBC BLD-RTO: 0 /100 WBC
PH, POC UA: 5
PLATELET # BLD AUTO: 414 K/UL (ref 150–350)
PMV BLD AUTO: 10.2 FL (ref 9.2–12.9)
PROTEIN, POC: NORMAL
RBC # BLD AUTO: 4.42 M/UL (ref 4–5.4)
SPECIFIC GRAVITY, POC UA: NORMAL
TSH SERPL DL<=0.005 MIU/L-ACNC: 1.92 UIU/ML (ref 0.4–4)
UROBILINOGEN, POC UA: NORMAL
WBC # BLD AUTO: 10.94 K/UL (ref 3.9–12.7)

## 2020-12-10 PROCEDURE — 86762 RUBELLA ANTIBODY: CPT

## 2020-12-10 PROCEDURE — 87340 HEPATITIS B SURFACE AG IA: CPT

## 2020-12-10 PROCEDURE — 0502F SUBSEQUENT PRENATAL CARE: CPT | Mod: CPTII,S$GLB,, | Performed by: SPECIALIST

## 2020-12-10 PROCEDURE — 86592 SYPHILIS TEST NON-TREP QUAL: CPT

## 2020-12-10 PROCEDURE — 85025 COMPLETE CBC W/AUTO DIFF WBC: CPT

## 2020-12-10 PROCEDURE — 86703 HIV-1/HIV-2 1 RESULT ANTBDY: CPT

## 2020-12-10 PROCEDURE — 86850 RBC ANTIBODY SCREEN: CPT

## 2020-12-10 PROCEDURE — 76817 PR US, OB, TRANSVAG APPROACH: ICD-10-PCS | Mod: S$GLB,,, | Performed by: SPECIALIST

## 2020-12-10 PROCEDURE — 36415 COLL VENOUS BLD VENIPUNCTURE: CPT | Mod: PO

## 2020-12-10 PROCEDURE — 76817 TRANSVAGINAL US OBSTETRIC: CPT | Mod: S$GLB,,, | Performed by: SPECIALIST

## 2020-12-10 PROCEDURE — 84443 ASSAY THYROID STIM HORMONE: CPT

## 2020-12-10 PROCEDURE — 99999 PR PBB SHADOW E&M-EST. PATIENT-LVL II: ICD-10-PCS | Mod: PBBFAC,,, | Performed by: SPECIALIST

## 2020-12-10 PROCEDURE — 0502F PR SUBSEQUENT PRENATAL CARE: ICD-10-PCS | Mod: CPTII,S$GLB,, | Performed by: SPECIALIST

## 2020-12-10 PROCEDURE — 99999 PR PBB SHADOW E&M-EST. PATIENT-LVL II: CPT | Mod: PBBFAC,,, | Performed by: SPECIALIST

## 2020-12-10 NOTE — PROCEDURES
Procedures     Procedure   TVS 6.5Mhz probe  Positive guallpa IUP CRL 8w1d Pos FHR 1444, viewed by pt EDC 7/19/21

## 2020-12-10 NOTE — PROGRESS NOTES
Pt returns today for pregnancy confirmation and dating. Denies n/v, vaginal bleeding or cramping    I reviewed pt's past medical history, past and current meds, family history, allergies and reviewed problem list  I spent 20 min with pt with approx half in consultation     Procedure   TVS 6.5Mhz probe  Positive guallpa IUP CRL 8w1d Pos FHR 1444, viewed by pt EDC 7/19/21      I discussed prenatal care plan and discussed importance of liberal po intake  answerd all questions  Pt will avoid SSRI for now and I discussed depression management risks .  RTO 4 weeks

## 2020-12-11 LAB
HBV SURFACE AG SERPL QL IA: NEGATIVE
HIV 1+2 AB+HIV1 P24 AG SERPL QL IA: NEGATIVE
RPR SER QL: NORMAL
RUBV IGG SER-ACNC: 29.8 IU/ML
RUBV IGG SER-IMP: REACTIVE

## 2021-01-02 ENCOUNTER — HOSPITAL ENCOUNTER (EMERGENCY)
Facility: HOSPITAL | Age: 21
Discharge: HOME | End: 2021-01-03
Attending: EMERGENCY MEDICINE
Payer: COMMERCIAL

## 2021-01-02 DIAGNOSIS — R51.9 NONINTRACTABLE HEADACHE, UNSPECIFIED CHRONICITY PATTERN, UNSPECIFIED HEADACHE TYPE: Primary | ICD-10-CM

## 2021-01-02 DIAGNOSIS — J02.9 SORE THROAT: ICD-10-CM

## 2021-01-02 DIAGNOSIS — R00.0 TACHYCARDIA: ICD-10-CM

## 2021-01-02 DIAGNOSIS — Z3A.12 12 WEEKS GESTATION OF PREGNANCY: ICD-10-CM

## 2021-01-02 DIAGNOSIS — B34.9 VIRAL SYNDROME: ICD-10-CM

## 2021-01-02 PROCEDURE — 99284 EMERGENCY DEPT VISIT MOD MDM: CPT

## 2021-01-03 VITALS
RESPIRATION RATE: 18 BRPM | HEIGHT: 65 IN | SYSTOLIC BLOOD PRESSURE: 112 MMHG | OXYGEN SATURATION: 100 % | WEIGHT: 230 LBS | BODY MASS INDEX: 38.32 KG/M2 | TEMPERATURE: 99 F | HEART RATE: 96 BPM | DIASTOLIC BLOOD PRESSURE: 57 MMHG

## 2021-01-03 LAB
B-HCG UR QL: POSITIVE
CTP QC/QA: YES
DEPRECATED S PYO AG THROAT QL EIA: NEGATIVE
SARS-COV-2 RDRP RESP QL NAA+PROBE: NEGATIVE

## 2021-01-03 PROCEDURE — 93005 ELECTROCARDIOGRAM TRACING: CPT | Performed by: INTERNAL MEDICINE

## 2021-01-03 PROCEDURE — 25000003 PHARM REV CODE 250: Performed by: EMERGENCY MEDICINE

## 2021-01-03 PROCEDURE — 87081 CULTURE SCREEN ONLY: CPT

## 2021-01-03 PROCEDURE — U0002 COVID-19 LAB TEST NON-CDC: HCPCS

## 2021-01-03 PROCEDURE — 81025 URINE PREGNANCY TEST: CPT | Performed by: EMERGENCY MEDICINE

## 2021-01-03 PROCEDURE — 93010 EKG 12-LEAD: ICD-10-PCS | Mod: ,,, | Performed by: INTERNAL MEDICINE

## 2021-01-03 PROCEDURE — 87880 STREP A ASSAY W/OPTIC: CPT

## 2021-01-03 PROCEDURE — 93010 ELECTROCARDIOGRAM REPORT: CPT | Mod: ,,, | Performed by: INTERNAL MEDICINE

## 2021-01-03 PROCEDURE — 63600175 PHARM REV CODE 636 W HCPCS: Performed by: EMERGENCY MEDICINE

## 2021-01-03 PROCEDURE — 96372 THER/PROPH/DIAG INJ SC/IM: CPT | Mod: 59

## 2021-01-03 RX ORDER — METOCLOPRAMIDE HYDROCHLORIDE 5 MG/ML
10 INJECTION INTRAMUSCULAR; INTRAVENOUS ONCE
Status: COMPLETED | OUTPATIENT
Start: 2021-01-03 | End: 2021-01-03

## 2021-01-03 RX ORDER — ACETAMINOPHEN 500 MG
1000 TABLET ORAL
Status: COMPLETED | OUTPATIENT
Start: 2021-01-03 | End: 2021-01-03

## 2021-01-03 RX ORDER — DIPHENHYDRAMINE HYDROCHLORIDE 50 MG/ML
25 INJECTION INTRAMUSCULAR; INTRAVENOUS ONCE
Status: COMPLETED | OUTPATIENT
Start: 2021-01-03 | End: 2021-01-03

## 2021-01-03 RX ADMIN — METOCLOPRAMIDE 10 MG: 5 INJECTION, SOLUTION INTRAMUSCULAR; INTRAVENOUS at 01:01

## 2021-01-03 RX ADMIN — ACETAMINOPHEN 1000 MG: 500 TABLET, FILM COATED ORAL at 01:01

## 2021-01-03 RX ADMIN — DIPHENHYDRAMINE HYDROCHLORIDE 25 MG: 50 INJECTION, SOLUTION INTRAMUSCULAR; INTRAVENOUS at 01:01

## 2021-01-05 LAB — BACTERIA THROAT CULT: NORMAL

## 2021-01-07 LAB
FINAL PATHOLOGIC DIAGNOSIS: NORMAL
Lab: NORMAL

## 2021-01-14 ENCOUNTER — ROUTINE PRENATAL (OUTPATIENT)
Dept: OBSTETRICS AND GYNECOLOGY | Facility: CLINIC | Age: 21
End: 2021-01-14
Payer: COMMERCIAL

## 2021-01-14 VITALS
WEIGHT: 235.25 LBS | BODY MASS INDEX: 39.14 KG/M2 | SYSTOLIC BLOOD PRESSURE: 120 MMHG | DIASTOLIC BLOOD PRESSURE: 70 MMHG

## 2021-01-14 DIAGNOSIS — Z3A.13 13 WEEKS GESTATION OF PREGNANCY: Primary | ICD-10-CM

## 2021-01-14 LAB
BILIRUB SERPL-MCNC: NORMAL MG/DL
BLOOD URINE, POC: NORMAL
CLARITY, POC UA: NORMAL
COLOR, POC UA: NORMAL
GLUCOSE UR QL STRIP: NORMAL
KETONES UR QL STRIP: NORMAL
LEUKOCYTE ESTERASE URINE, POC: NORMAL
NITRITE, POC UA: NORMAL
PH, POC UA: 5
PROTEIN, POC: NORMAL
SPECIFIC GRAVITY, POC UA: NORMAL
UROBILINOGEN, POC UA: NORMAL

## 2021-01-14 PROCEDURE — 99999 PR PBB SHADOW E&M-EST. PATIENT-LVL II: ICD-10-PCS | Mod: PBBFAC,,, | Performed by: SPECIALIST

## 2021-01-14 PROCEDURE — 0502F SUBSEQUENT PRENATAL CARE: CPT | Mod: CPTII,S$GLB,, | Performed by: SPECIALIST

## 2021-01-14 PROCEDURE — 0502F PR SUBSEQUENT PRENATAL CARE: ICD-10-PCS | Mod: CPTII,S$GLB,, | Performed by: SPECIALIST

## 2021-01-14 PROCEDURE — 99999 PR PBB SHADOW E&M-EST. PATIENT-LVL II: CPT | Mod: PBBFAC,,, | Performed by: SPECIALIST

## 2021-02-11 ENCOUNTER — ROUTINE PRENATAL (OUTPATIENT)
Dept: OBSTETRICS AND GYNECOLOGY | Facility: CLINIC | Age: 21
End: 2021-02-11
Payer: COMMERCIAL

## 2021-02-11 VITALS
WEIGHT: 238.31 LBS | SYSTOLIC BLOOD PRESSURE: 110 MMHG | BODY MASS INDEX: 39.66 KG/M2 | DIASTOLIC BLOOD PRESSURE: 60 MMHG

## 2021-02-11 DIAGNOSIS — Z3A.17 17 WEEKS GESTATION OF PREGNANCY: Primary | ICD-10-CM

## 2021-02-11 PROCEDURE — 99999 PR PBB SHADOW E&M-EST. PATIENT-LVL II: ICD-10-PCS | Mod: PBBFAC,,, | Performed by: SPECIALIST

## 2021-02-11 PROCEDURE — 0502F SUBSEQUENT PRENATAL CARE: CPT | Mod: CPTII,S$GLB,, | Performed by: SPECIALIST

## 2021-02-11 PROCEDURE — 0502F PR SUBSEQUENT PRENATAL CARE: ICD-10-PCS | Mod: CPTII,S$GLB,, | Performed by: SPECIALIST

## 2021-02-11 PROCEDURE — 99999 PR PBB SHADOW E&M-EST. PATIENT-LVL II: CPT | Mod: PBBFAC,,, | Performed by: SPECIALIST

## 2021-02-11 RX ORDER — .BETA.-CAROTENE, ASCORBIC ACID, CHOLECALCIFEROL, .ALPHA.-TOCOPHEROL ACETATE, DL-, THIAMINE MONONITRATE, RIBOFLAVIN, NIACINAMIDE, PYRIDOXINE HYDROCHLORIDE, FOLIC ACID, 5-METHYLTETRAHYDROFOLIC ACID, CALCIUM FORMATE, FERROUS ASPARTO GLYCINATE, CYANOCOBALAMIN, BIOTIN, POTASSIUM IODIDE, MAGNESIUM OXIDE, ZINC OXIDE AND CUPRIC OXIDE 2600; 75; 600; 40; 3; 3.5; 21; 21; 400; 600; 155; 20; 13; 330; 150; 25; 15; 1.5 [IU]/1; MG/1; [IU]/1; [IU]/1; MG/1; MG/1; MG/1; MG/1; UG/1; UG/1; MG/1; MG/1; UG/1; UG/1; UG/1; MG/1; MG/1; MG/1
TABLET, COATED ORAL
COMMUNITY
Start: 2020-11-19 | End: 2021-04-12 | Stop reason: SDUPTHER

## 2021-02-25 ENCOUNTER — HOSPITAL ENCOUNTER (OUTPATIENT)
Dept: RADIOLOGY | Facility: HOSPITAL | Age: 21
Discharge: HOME | End: 2021-02-25
Attending: SPECIALIST
Payer: COMMERCIAL

## 2021-02-25 DIAGNOSIS — Z3A.17 17 WEEKS GESTATION OF PREGNANCY: ICD-10-CM

## 2021-02-25 PROCEDURE — 76805 OB US >/= 14 WKS SNGL FETUS: CPT | Mod: TC,PN

## 2021-02-25 PROCEDURE — 76805 OB US >/= 14 WKS SNGL FETUS: CPT | Mod: 26,,, | Performed by: RADIOLOGY

## 2021-02-25 PROCEDURE — 76805 US OB 14+ WEEKS, TRANSABDOM, SINGLE GESTATION: ICD-10-PCS | Mod: 26,,, | Performed by: RADIOLOGY

## 2021-03-15 ENCOUNTER — ROUTINE PRENATAL (OUTPATIENT)
Dept: OBSTETRICS AND GYNECOLOGY | Facility: CLINIC | Age: 21
End: 2021-03-15
Payer: COMMERCIAL

## 2021-03-15 VITALS
BODY MASS INDEX: 41.24 KG/M2 | SYSTOLIC BLOOD PRESSURE: 118 MMHG | WEIGHT: 247.81 LBS | DIASTOLIC BLOOD PRESSURE: 68 MMHG

## 2021-03-15 DIAGNOSIS — Z3A.22 22 WEEKS GESTATION OF PREGNANCY: Primary | ICD-10-CM

## 2021-03-15 LAB
BILIRUB SERPL-MCNC: NORMAL MG/DL
BLOOD URINE, POC: NORMAL
CLARITY, POC UA: NORMAL
COLOR, POC UA: NORMAL
GLUCOSE UR QL STRIP: NORMAL
KETONES UR QL STRIP: NORMAL
LEUKOCYTE ESTERASE URINE, POC: NORMAL
NITRITE, POC UA: NORMAL
PH, POC UA: 7
PROTEIN, POC: NORMAL
SPECIFIC GRAVITY, POC UA: NORMAL
UROBILINOGEN, POC UA: NORMAL

## 2021-03-15 PROCEDURE — 0502F PR SUBSEQUENT PRENATAL CARE: ICD-10-PCS | Mod: CPTII,S$GLB,, | Performed by: SPECIALIST

## 2021-03-15 PROCEDURE — 99999 PR PBB SHADOW E&M-EST. PATIENT-LVL II: CPT | Mod: PBBFAC,,, | Performed by: SPECIALIST

## 2021-03-15 PROCEDURE — 99999 PR PBB SHADOW E&M-EST. PATIENT-LVL II: ICD-10-PCS | Mod: PBBFAC,,, | Performed by: SPECIALIST

## 2021-03-15 PROCEDURE — 0502F SUBSEQUENT PRENATAL CARE: CPT | Mod: CPTII,S$GLB,, | Performed by: SPECIALIST

## 2021-04-12 ENCOUNTER — ROUTINE PRENATAL (OUTPATIENT)
Dept: OBSTETRICS AND GYNECOLOGY | Facility: CLINIC | Age: 21
End: 2021-04-12
Payer: COMMERCIAL

## 2021-04-12 ENCOUNTER — LAB VISIT (OUTPATIENT)
Dept: LAB | Facility: HOSPITAL | Age: 21
End: 2021-04-12
Attending: SPECIALIST
Payer: COMMERCIAL

## 2021-04-12 VITALS — DIASTOLIC BLOOD PRESSURE: 70 MMHG | WEIGHT: 254.19 LBS | BODY MASS INDEX: 42.3 KG/M2 | SYSTOLIC BLOOD PRESSURE: 126 MMHG

## 2021-04-12 DIAGNOSIS — Z3A.22 22 WEEKS GESTATION OF PREGNANCY: ICD-10-CM

## 2021-04-12 DIAGNOSIS — Z3A.26 26 WEEKS GESTATION OF PREGNANCY: Primary | ICD-10-CM

## 2021-04-12 LAB
BILIRUB SERPL-MCNC: NORMAL MG/DL
BLOOD URINE, POC: NORMAL
CLARITY, POC UA: NORMAL
COLOR, POC UA: NORMAL
GLUCOSE SERPL-MCNC: 177 MG/DL (ref 70–140)
GLUCOSE UR QL STRIP: NORMAL
KETONES UR QL STRIP: NORMAL
LEUKOCYTE ESTERASE URINE, POC: NORMAL
NITRITE, POC UA: NORMAL
PH, POC UA: 6
PROTEIN, POC: NORMAL
SPECIFIC GRAVITY, POC UA: NORMAL
UROBILINOGEN, POC UA: NORMAL

## 2021-04-12 PROCEDURE — 99999 PR PBB SHADOW E&M-EST. PATIENT-LVL II: ICD-10-PCS | Mod: PBBFAC,,, | Performed by: SPECIALIST

## 2021-04-12 PROCEDURE — 99999 PR PBB SHADOW E&M-EST. PATIENT-LVL II: CPT | Mod: PBBFAC,,, | Performed by: SPECIALIST

## 2021-04-12 PROCEDURE — 82950 GLUCOSE TEST: CPT | Performed by: SPECIALIST

## 2021-04-12 PROCEDURE — 36415 COLL VENOUS BLD VENIPUNCTURE: CPT | Mod: PO | Performed by: SPECIALIST

## 2021-04-12 PROCEDURE — 0502F SUBSEQUENT PRENATAL CARE: CPT | Mod: CPTII,S$GLB,, | Performed by: SPECIALIST

## 2021-04-12 PROCEDURE — 0502F PR SUBSEQUENT PRENATAL CARE: ICD-10-PCS | Mod: CPTII,S$GLB,, | Performed by: SPECIALIST

## 2021-04-13 DIAGNOSIS — O99.810 ABNORMAL GLUCOSE TOLERANCE TEST (GTT) DURING PREGNANCY, ANTEPARTUM: Primary | ICD-10-CM

## 2021-04-26 ENCOUNTER — ROUTINE PRENATAL (OUTPATIENT)
Dept: OBSTETRICS AND GYNECOLOGY | Facility: CLINIC | Age: 21
End: 2021-04-26
Payer: COMMERCIAL

## 2021-04-26 VITALS — BODY MASS INDEX: 43.44 KG/M2 | DIASTOLIC BLOOD PRESSURE: 82 MMHG | SYSTOLIC BLOOD PRESSURE: 119 MMHG | WEIGHT: 261 LBS

## 2021-04-26 DIAGNOSIS — Z3A.28 28 WEEKS GESTATION OF PREGNANCY: Primary | ICD-10-CM

## 2021-04-26 LAB
BILIRUB SERPL-MCNC: NEGATIVE MG/DL
BLOOD URINE, POC: NEGATIVE
CLARITY, POC UA: NORMAL
COLOR, POC UA: NORMAL
GLUCOSE UR QL STRIP: NORMAL
KETONES UR QL STRIP: NEGATIVE
LEUKOCYTE ESTERASE URINE, POC: NEGATIVE
NITRITE, POC UA: NEGATIVE
PH, POC UA: 7
PROTEIN, POC: NORMAL
SPECIFIC GRAVITY, POC UA: NORMAL
UROBILINOGEN, POC UA: NORMAL

## 2021-04-26 PROCEDURE — 0502F SUBSEQUENT PRENATAL CARE: CPT | Mod: CPTII,S$GLB,, | Performed by: SPECIALIST

## 2021-04-26 PROCEDURE — 99999 PR PBB SHADOW E&M-EST. PATIENT-LVL II: CPT | Mod: PBBFAC,,, | Performed by: SPECIALIST

## 2021-04-26 PROCEDURE — 99999 PR PBB SHADOW E&M-EST. PATIENT-LVL II: ICD-10-PCS | Mod: PBBFAC,,, | Performed by: SPECIALIST

## 2021-04-26 PROCEDURE — 0502F PR SUBSEQUENT PRENATAL CARE: ICD-10-PCS | Mod: CPTII,S$GLB,, | Performed by: SPECIALIST

## 2021-04-27 ENCOUNTER — LAB VISIT (OUTPATIENT)
Dept: LAB | Facility: HOSPITAL | Age: 21
End: 2021-04-27
Attending: SPECIALIST
Payer: COMMERCIAL

## 2021-04-27 DIAGNOSIS — O99.810 ABNORMAL GLUCOSE TOLERANCE TEST (GTT) DURING PREGNANCY, ANTEPARTUM: ICD-10-CM

## 2021-04-27 PROCEDURE — 82952 GTT-ADDED SAMPLES: CPT | Performed by: SPECIALIST

## 2021-04-27 PROCEDURE — 36415 COLL VENOUS BLD VENIPUNCTURE: CPT | Mod: PO | Performed by: SPECIALIST

## 2021-04-28 LAB
GLUCOSE SERPL-MCNC: 117 MG/DL
GLUCOSE SERPL-MCNC: 123 MG/DL
GLUCOSE SERPL-MCNC: 162 MG/DL
GLUCOSE SERPL-MCNC: 71 MG/DL (ref 70–110)

## 2021-05-06 ENCOUNTER — PATIENT MESSAGE (OUTPATIENT)
Dept: OBSTETRICS AND GYNECOLOGY | Facility: CLINIC | Age: 21
End: 2021-05-06

## 2021-05-06 ENCOUNTER — TELEPHONE (OUTPATIENT)
Dept: OBSTETRICS AND GYNECOLOGY | Facility: CLINIC | Age: 21
End: 2021-05-06

## 2021-05-11 ENCOUNTER — ROUTINE PRENATAL (OUTPATIENT)
Dept: OBSTETRICS AND GYNECOLOGY | Facility: CLINIC | Age: 21
End: 2021-05-11
Payer: COMMERCIAL

## 2021-05-11 VITALS
DIASTOLIC BLOOD PRESSURE: 60 MMHG | WEIGHT: 262.38 LBS | BODY MASS INDEX: 43.66 KG/M2 | SYSTOLIC BLOOD PRESSURE: 118 MMHG

## 2021-05-11 DIAGNOSIS — Z3A.30 30 WEEKS GESTATION OF PREGNANCY: Primary | ICD-10-CM

## 2021-05-11 LAB
BILIRUB SERPL-MCNC: NORMAL MG/DL
BLOOD URINE, POC: NORMAL
CLARITY, POC UA: NORMAL
COLOR, POC UA: NORMAL
GLUCOSE UR QL STRIP: NORMAL
KETONES UR QL STRIP: NORMAL
LEUKOCYTE ESTERASE URINE, POC: NORMAL
NITRITE, POC UA: NORMAL
PH, POC UA: 7
PROTEIN, POC: NORMAL
SPECIFIC GRAVITY, POC UA: NORMAL
UROBILINOGEN, POC UA: 4

## 2021-05-11 PROCEDURE — 99999 PR PBB SHADOW E&M-EST. PATIENT-LVL II: CPT | Mod: PBBFAC,,, | Performed by: SPECIALIST

## 2021-05-11 PROCEDURE — 0502F SUBSEQUENT PRENATAL CARE: CPT | Mod: S$GLB,,, | Performed by: SPECIALIST

## 2021-05-11 PROCEDURE — 0502F PR SUBSEQUENT PRENATAL CARE: ICD-10-PCS | Mod: S$GLB,,, | Performed by: SPECIALIST

## 2021-05-11 PROCEDURE — 99999 PR PBB SHADOW E&M-EST. PATIENT-LVL II: ICD-10-PCS | Mod: PBBFAC,,, | Performed by: SPECIALIST

## 2021-05-11 RX ORDER — METRONIDAZOLE 250 MG/1
250 TABLET ORAL 3 TIMES DAILY
Qty: 21 TABLET | Refills: 0 | Status: SHIPPED | OUTPATIENT
Start: 2021-05-11 | End: 2021-05-18

## 2021-05-24 ENCOUNTER — PATIENT MESSAGE (OUTPATIENT)
Dept: ADMINISTRATIVE | Facility: OTHER | Age: 21
End: 2021-05-24

## 2021-05-25 ENCOUNTER — LAB VISIT (OUTPATIENT)
Dept: LAB | Facility: HOSPITAL | Age: 21
End: 2021-05-25
Attending: SPECIALIST
Payer: COMMERCIAL

## 2021-05-25 ENCOUNTER — ROUTINE PRENATAL (OUTPATIENT)
Dept: OBSTETRICS AND GYNECOLOGY | Facility: CLINIC | Age: 21
End: 2021-05-25
Payer: COMMERCIAL

## 2021-05-25 VITALS
SYSTOLIC BLOOD PRESSURE: 122 MMHG | WEIGHT: 263.69 LBS | DIASTOLIC BLOOD PRESSURE: 60 MMHG | BODY MASS INDEX: 43.88 KG/M2

## 2021-05-25 DIAGNOSIS — B96.89 BV (BACTERIAL VAGINOSIS): ICD-10-CM

## 2021-05-25 DIAGNOSIS — N89.8 VAGINAL DISCHARGE DURING PREGNANCY IN THIRD TRIMESTER: ICD-10-CM

## 2021-05-25 DIAGNOSIS — N76.0 BV (BACTERIAL VAGINOSIS): ICD-10-CM

## 2021-05-25 DIAGNOSIS — O26.893 VAGINAL DISCHARGE DURING PREGNANCY IN THIRD TRIMESTER: ICD-10-CM

## 2021-05-25 DIAGNOSIS — Z3A.32 32 WEEKS GESTATION OF PREGNANCY: Primary | ICD-10-CM

## 2021-05-25 DIAGNOSIS — Z3A.30 30 WEEKS GESTATION OF PREGNANCY: ICD-10-CM

## 2021-05-25 LAB
BILIRUB SERPL-MCNC: NORMAL MG/DL
BLOOD URINE, POC: NORMAL
CLARITY, POC UA: NORMAL
COLOR, POC UA: NORMAL
GLUCOSE SERPL-MCNC: 89 MG/DL (ref 70–110)
GLUCOSE UR QL STRIP: NORMAL
KETONES UR QL STRIP: NORMAL
LEUKOCYTE ESTERASE URINE, POC: NORMAL
NITRITE, POC UA: NORMAL
PH, POC UA: 6
PROTEIN, POC: NORMAL
SPECIFIC GRAVITY, POC UA: NORMAL
UROBILINOGEN, POC UA: NORMAL

## 2021-05-25 PROCEDURE — 82947 ASSAY GLUCOSE BLOOD QUANT: CPT | Performed by: SPECIALIST

## 2021-05-25 PROCEDURE — 87481 CANDIDA DNA AMP PROBE: CPT | Mod: 59 | Performed by: SPECIALIST

## 2021-05-25 PROCEDURE — 99999 PR PBB SHADOW E&M-EST. PATIENT-LVL II: ICD-10-PCS | Mod: PBBFAC,,, | Performed by: SPECIALIST

## 2021-05-25 PROCEDURE — 36415 COLL VENOUS BLD VENIPUNCTURE: CPT | Mod: PO | Performed by: SPECIALIST

## 2021-05-25 PROCEDURE — 99999 PR PBB SHADOW E&M-EST. PATIENT-LVL II: CPT | Mod: PBBFAC,,, | Performed by: SPECIALIST

## 2021-05-25 PROCEDURE — 0502F SUBSEQUENT PRENATAL CARE: CPT | Mod: CPTII,S$GLB,, | Performed by: SPECIALIST

## 2021-05-25 PROCEDURE — 0502F PR SUBSEQUENT PRENATAL CARE: ICD-10-PCS | Mod: CPTII,S$GLB,, | Performed by: SPECIALIST

## 2021-05-25 PROCEDURE — 87661 TRICHOMONAS VAGINALIS AMPLIF: CPT | Mod: 59 | Performed by: SPECIALIST

## 2021-05-25 RX ORDER — METRONIDAZOLE 250 MG/1
250 TABLET ORAL 3 TIMES DAILY
Qty: 21 TABLET | Refills: 0 | Status: SHIPPED | OUTPATIENT
Start: 2021-05-25 | End: 2021-06-01

## 2021-05-27 LAB
BACTERIAL VAGINOSIS DNA: NEGATIVE
CANDIDA GLABRATA DNA: NEGATIVE
CANDIDA KRUSEI DNA: NEGATIVE
CANDIDA RRNA VAG QL PROBE: NEGATIVE
T VAGINALIS RRNA GENITAL QL PROBE: NEGATIVE

## 2021-06-08 ENCOUNTER — HOSPITAL ENCOUNTER (OUTPATIENT)
Dept: RADIOLOGY | Facility: HOSPITAL | Age: 21
Discharge: HOME | End: 2021-06-08
Attending: SPECIALIST
Payer: COMMERCIAL

## 2021-06-08 DIAGNOSIS — Z3A.32 32 WEEKS GESTATION OF PREGNANCY: ICD-10-CM

## 2021-06-08 PROCEDURE — 76816 OB US FOLLOW-UP PER FETUS: CPT | Mod: 26,,, | Performed by: RADIOLOGY

## 2021-06-08 PROCEDURE — 76816 OB US FOLLOW-UP PER FETUS: CPT | Mod: TC,PN

## 2021-06-08 PROCEDURE — 76816 US OB FOLLOW UP EA GESTATION TRANSABDOMINAL: ICD-10-PCS | Mod: 26,,, | Performed by: RADIOLOGY

## 2021-06-10 ENCOUNTER — PATIENT MESSAGE (OUTPATIENT)
Dept: OBSTETRICS AND GYNECOLOGY | Facility: CLINIC | Age: 21
End: 2021-06-10

## 2021-06-16 ENCOUNTER — ROUTINE PRENATAL (OUTPATIENT)
Dept: OBSTETRICS AND GYNECOLOGY | Facility: CLINIC | Age: 21
End: 2021-06-16
Payer: COMMERCIAL

## 2021-06-16 VITALS — WEIGHT: 272.5 LBS | SYSTOLIC BLOOD PRESSURE: 116 MMHG | DIASTOLIC BLOOD PRESSURE: 70 MMHG | BODY MASS INDEX: 45.34 KG/M2

## 2021-06-16 DIAGNOSIS — Z3A.35 35 WEEKS GESTATION OF PREGNANCY: Primary | ICD-10-CM

## 2021-06-16 PROCEDURE — 0502F PR SUBSEQUENT PRENATAL CARE: ICD-10-PCS | Mod: CPTII,S$GLB,, | Performed by: SPECIALIST

## 2021-06-16 PROCEDURE — 0502F SUBSEQUENT PRENATAL CARE: CPT | Mod: CPTII,S$GLB,, | Performed by: SPECIALIST

## 2021-06-16 PROCEDURE — 87081 CULTURE SCREEN ONLY: CPT | Performed by: SPECIALIST

## 2021-06-16 PROCEDURE — 87184 SC STD DISK METHOD PER PLATE: CPT | Performed by: SPECIALIST

## 2021-06-16 PROCEDURE — 87147 CULTURE TYPE IMMUNOLOGIC: CPT | Performed by: SPECIALIST

## 2021-06-16 PROCEDURE — 99999 PR PBB SHADOW E&M-EST. PATIENT-LVL II: ICD-10-PCS | Mod: PBBFAC,,, | Performed by: SPECIALIST

## 2021-06-16 PROCEDURE — 99999 PR PBB SHADOW E&M-EST. PATIENT-LVL II: CPT | Mod: PBBFAC,,, | Performed by: SPECIALIST

## 2021-06-19 ENCOUNTER — PATIENT MESSAGE (OUTPATIENT)
Dept: OBSTETRICS AND GYNECOLOGY | Facility: CLINIC | Age: 21
End: 2021-06-19

## 2021-06-19 LAB — BACTERIA SPEC AEROBE CULT: ABNORMAL

## 2021-06-28 ENCOUNTER — ROUTINE PRENATAL (OUTPATIENT)
Dept: OBSTETRICS AND GYNECOLOGY | Facility: CLINIC | Age: 21
End: 2021-06-28
Payer: COMMERCIAL

## 2021-06-28 ENCOUNTER — HOSPITAL ENCOUNTER (OUTPATIENT)
Dept: RADIOLOGY | Facility: HOSPITAL | Age: 21
Discharge: HOME | End: 2021-06-28
Attending: SPECIALIST
Payer: COMMERCIAL

## 2021-06-28 VITALS
SYSTOLIC BLOOD PRESSURE: 124 MMHG | BODY MASS INDEX: 46.96 KG/M2 | WEIGHT: 282.19 LBS | DIASTOLIC BLOOD PRESSURE: 60 MMHG

## 2021-06-28 DIAGNOSIS — Z3A.35 35 WEEKS GESTATION OF PREGNANCY: ICD-10-CM

## 2021-06-28 DIAGNOSIS — Z3A.37 37 WEEKS GESTATION OF PREGNANCY: Primary | ICD-10-CM

## 2021-06-28 LAB
BILIRUB SERPL-MCNC: NORMAL MG/DL
BLOOD URINE, POC: NORMAL
CLARITY, POC UA: NORMAL
COLOR, POC UA: NORMAL
GLUCOSE UR QL STRIP: 50
KETONES UR QL STRIP: NORMAL
LEUKOCYTE ESTERASE URINE, POC: NORMAL
NITRITE, POC UA: NORMAL
PH, POC UA: 6
PROTEIN, POC: NORMAL
SPECIFIC GRAVITY, POC UA: NORMAL
UROBILINOGEN, POC UA: NORMAL

## 2021-06-28 PROCEDURE — 0502F SUBSEQUENT PRENATAL CARE: CPT | Mod: CPTII,S$GLB,, | Performed by: SPECIALIST

## 2021-06-28 PROCEDURE — 76815 OB US LIMITED FETUS(S): CPT | Mod: TC,PO

## 2021-06-28 PROCEDURE — 76815 OB US LIMITED FETUS(S): CPT | Mod: 26,,, | Performed by: RADIOLOGY

## 2021-06-28 PROCEDURE — 99999 PR PBB SHADOW E&M-EST. PATIENT-LVL II: ICD-10-PCS | Mod: PBBFAC,,, | Performed by: SPECIALIST

## 2021-06-28 PROCEDURE — 99999 PR PBB SHADOW E&M-EST. PATIENT-LVL II: CPT | Mod: PBBFAC,,, | Performed by: SPECIALIST

## 2021-06-28 PROCEDURE — 0502F PR SUBSEQUENT PRENATAL CARE: ICD-10-PCS | Mod: CPTII,S$GLB,, | Performed by: SPECIALIST

## 2021-06-28 PROCEDURE — 76815 US OB LIMITED 1 OR MORE GESTATIONS: ICD-10-PCS | Mod: 26,,, | Performed by: RADIOLOGY

## 2021-07-06 ENCOUNTER — ROUTINE PRENATAL (OUTPATIENT)
Dept: OBSTETRICS AND GYNECOLOGY | Facility: CLINIC | Age: 21
End: 2021-07-06
Payer: COMMERCIAL

## 2021-07-06 VITALS
SYSTOLIC BLOOD PRESSURE: 124 MMHG | DIASTOLIC BLOOD PRESSURE: 66 MMHG | WEIGHT: 283.94 LBS | BODY MASS INDEX: 47.25 KG/M2

## 2021-07-06 DIAGNOSIS — Z3A.38 38 WEEKS GESTATION OF PREGNANCY: Primary | ICD-10-CM

## 2021-07-06 LAB
BILIRUB SERPL-MCNC: NORMAL MG/DL
BLOOD URINE, POC: NORMAL
CLARITY, POC UA: NORMAL
COLOR, POC UA: NORMAL
GLUCOSE UR QL STRIP: NORMAL
KETONES UR QL STRIP: NORMAL
LEUKOCYTE ESTERASE URINE, POC: NORMAL
NITRITE, POC UA: NORMAL
PH, POC UA: 6
PROTEIN, POC: NORMAL
SPECIFIC GRAVITY, POC UA: NORMAL
UROBILINOGEN, POC UA: NORMAL

## 2021-07-06 PROCEDURE — 0502F PR SUBSEQUENT PRENATAL CARE: ICD-10-PCS | Mod: CPTII,S$GLB,, | Performed by: SPECIALIST

## 2021-07-06 PROCEDURE — 99999 PR PBB SHADOW E&M-EST. PATIENT-LVL II: ICD-10-PCS | Mod: PBBFAC,,, | Performed by: SPECIALIST

## 2021-07-06 PROCEDURE — 99999 PR PBB SHADOW E&M-EST. PATIENT-LVL II: CPT | Mod: PBBFAC,,, | Performed by: SPECIALIST

## 2021-07-06 PROCEDURE — 0502F SUBSEQUENT PRENATAL CARE: CPT | Mod: CPTII,S$GLB,, | Performed by: SPECIALIST

## 2021-07-19 ENCOUNTER — PATIENT MESSAGE (OUTPATIENT)
Dept: OBSTETRICS AND GYNECOLOGY | Facility: CLINIC | Age: 21
End: 2021-07-19

## 2021-07-20 ENCOUNTER — PATIENT MESSAGE (OUTPATIENT)
Dept: OBSTETRICS AND GYNECOLOGY | Facility: CLINIC | Age: 21
End: 2021-07-20

## 2021-07-20 ENCOUNTER — POSTPARTUM VISIT (OUTPATIENT)
Dept: OBSTETRICS AND GYNECOLOGY | Facility: CLINIC | Age: 21
End: 2021-07-20
Payer: COMMERCIAL

## 2021-07-20 VITALS
BODY MASS INDEX: 43.96 KG/M2 | DIASTOLIC BLOOD PRESSURE: 80 MMHG | HEIGHT: 65 IN | RESPIRATION RATE: 18 BRPM | SYSTOLIC BLOOD PRESSURE: 128 MMHG | WEIGHT: 263.88 LBS

## 2021-07-20 PROCEDURE — 99999 PR PBB SHADOW E&M-EST. PATIENT-LVL III: CPT | Mod: PBBFAC,,, | Performed by: SPECIALIST

## 2021-07-20 PROCEDURE — 0503F PR POSTPARTUM CARE VISIT: ICD-10-PCS | Mod: CPTII,S$GLB,, | Performed by: SPECIALIST

## 2021-07-20 PROCEDURE — 99999 PR PBB SHADOW E&M-EST. PATIENT-LVL III: ICD-10-PCS | Mod: PBBFAC,,, | Performed by: SPECIALIST

## 2021-07-20 PROCEDURE — 0503F POSTPARTUM CARE VISIT: CPT | Mod: CPTII,S$GLB,, | Performed by: SPECIALIST

## 2021-07-20 RX ORDER — CEPHALEXIN 250 MG/1
250 CAPSULE ORAL 4 TIMES DAILY
Qty: 40 CAPSULE | Refills: 0 | Status: SHIPPED | OUTPATIENT
Start: 2021-07-20 | End: 2021-07-30

## 2021-07-21 ENCOUNTER — OFFICE VISIT (OUTPATIENT)
Dept: FAMILY MEDICINE | Facility: CLINIC | Age: 21
End: 2021-07-21
Payer: COMMERCIAL

## 2021-07-21 VITALS
WEIGHT: 262 LBS | OXYGEN SATURATION: 99 % | SYSTOLIC BLOOD PRESSURE: 137 MMHG | HEART RATE: 85 BPM | DIASTOLIC BLOOD PRESSURE: 61 MMHG | TEMPERATURE: 99 F | BODY MASS INDEX: 43.65 KG/M2 | HEIGHT: 65 IN | RESPIRATION RATE: 17 BRPM

## 2021-07-21 DIAGNOSIS — F32.A ANXIETY AND DEPRESSION: Primary | ICD-10-CM

## 2021-07-21 DIAGNOSIS — F41.9 ANXIETY AND DEPRESSION: Primary | ICD-10-CM

## 2021-07-21 DIAGNOSIS — G56.01 CARPAL TUNNEL SYNDROME, RIGHT: ICD-10-CM

## 2021-07-21 DIAGNOSIS — O92.79 ENGORGEMENT OF BREASTS, POSTPARTUM CONDITION OR COMPLICATION: ICD-10-CM

## 2021-07-21 PROCEDURE — 1111F DSCHRG MED/CURRENT MED MERGE: CPT | Mod: S$GLB,,, | Performed by: FAMILY MEDICINE

## 2021-07-21 PROCEDURE — 3008F BODY MASS INDEX DOCD: CPT | Mod: S$GLB,,, | Performed by: FAMILY MEDICINE

## 2021-07-21 PROCEDURE — 1111F PR DISCHARGE MEDS RECONCILED W/ CURRENT OUTPATIENT MED LIST: ICD-10-PCS | Mod: S$GLB,,, | Performed by: FAMILY MEDICINE

## 2021-07-21 PROCEDURE — 1126F AMNT PAIN NOTED NONE PRSNT: CPT | Mod: S$GLB,,, | Performed by: FAMILY MEDICINE

## 2021-07-21 PROCEDURE — 99214 PR OFFICE/OUTPT VISIT, EST, LEVL IV, 30-39 MIN: ICD-10-PCS | Mod: S$GLB,,, | Performed by: FAMILY MEDICINE

## 2021-07-21 PROCEDURE — 1126F PR PAIN SEVERITY QUANTIFIED, NO PAIN PRESENT: ICD-10-PCS | Mod: S$GLB,,, | Performed by: FAMILY MEDICINE

## 2021-07-21 PROCEDURE — 3008F PR BODY MASS INDEX (BMI) DOCUMENTED: ICD-10-PCS | Mod: S$GLB,,, | Performed by: FAMILY MEDICINE

## 2021-07-21 PROCEDURE — 99214 OFFICE O/P EST MOD 30 MIN: CPT | Mod: S$GLB,,, | Performed by: FAMILY MEDICINE

## 2021-07-21 RX ORDER — SERTRALINE HYDROCHLORIDE 50 MG/1
50 TABLET, FILM COATED ORAL DAILY
Qty: 30 TABLET | Refills: 11 | Status: SHIPPED | OUTPATIENT
Start: 2021-07-21 | End: 2022-06-08

## 2021-08-17 ENCOUNTER — POSTPARTUM VISIT (OUTPATIENT)
Dept: OBSTETRICS AND GYNECOLOGY | Facility: CLINIC | Age: 21
End: 2021-08-17
Payer: COMMERCIAL

## 2021-08-17 VITALS
HEIGHT: 65 IN | WEIGHT: 246.94 LBS | BODY MASS INDEX: 41.14 KG/M2 | SYSTOLIC BLOOD PRESSURE: 126 MMHG | RESPIRATION RATE: 18 BRPM | DIASTOLIC BLOOD PRESSURE: 70 MMHG

## 2021-08-17 PROCEDURE — 99999 PR PBB SHADOW E&M-EST. PATIENT-LVL III: CPT | Mod: PBBFAC,,, | Performed by: SPECIALIST

## 2021-08-17 PROCEDURE — 0503F PR POSTPARTUM CARE VISIT: ICD-10-PCS | Mod: CPTII,S$GLB,, | Performed by: SPECIALIST

## 2021-08-17 PROCEDURE — 99999 PR PBB SHADOW E&M-EST. PATIENT-LVL III: ICD-10-PCS | Mod: PBBFAC,,, | Performed by: SPECIALIST

## 2021-08-17 PROCEDURE — 0503F POSTPARTUM CARE VISIT: CPT | Mod: CPTII,S$GLB,, | Performed by: SPECIALIST

## 2021-08-17 RX ORDER — NORETHINDRONE ACETATE AND ETHINYL ESTRADIOL .02; 1 MG/1; MG/1
1 TABLET ORAL DAILY
Qty: 30 TABLET | Refills: 3 | Status: ON HOLD | OUTPATIENT
Start: 2021-08-17 | End: 2021-10-27 | Stop reason: HOSPADM

## 2021-10-25 ENCOUNTER — HOSPITAL ENCOUNTER (INPATIENT)
Facility: HOSPITAL | Age: 21
LOS: 2 days | Discharge: HOME | DRG: 176 | End: 2021-10-27
Attending: EMERGENCY MEDICINE | Admitting: INTERNAL MEDICINE
Payer: COMMERCIAL

## 2021-10-25 DIAGNOSIS — D50.8 OTHER IRON DEFICIENCY ANEMIA: ICD-10-CM

## 2021-10-25 DIAGNOSIS — R06.02 SOB (SHORTNESS OF BREATH): ICD-10-CM

## 2021-10-25 DIAGNOSIS — I26.99 BILATERAL PULMONARY EMBOLISM: ICD-10-CM

## 2021-10-25 DIAGNOSIS — I26.90 ACUTE SEPTIC PULMONARY EMBOLISM WITHOUT ACUTE COR PULMONALE: Primary | ICD-10-CM

## 2021-10-25 DIAGNOSIS — R07.9 CHEST PAIN: ICD-10-CM

## 2021-10-25 DIAGNOSIS — I26.99 OTHER ACUTE PULMONARY EMBOLISM WITHOUT ACUTE COR PULMONALE: ICD-10-CM

## 2021-10-25 DIAGNOSIS — I26.99 PULMONARY EMBOLISM: ICD-10-CM

## 2021-10-25 LAB
ABO + RH BLD: NORMAL
ALBUMIN SERPL BCP-MCNC: 3.3 G/DL (ref 3.5–5.2)
ALP SERPL-CCNC: 82 U/L (ref 55–135)
ALT SERPL W/O P-5'-P-CCNC: 14 U/L (ref 10–44)
ANION GAP SERPL CALC-SCNC: 13 MMOL/L (ref 8–16)
AST SERPL-CCNC: 12 U/L (ref 10–40)
BASOPHILS # BLD AUTO: 0.06 K/UL (ref 0–0.2)
BASOPHILS NFR BLD: 0.6 % (ref 0–1.9)
BILIRUB SERPL-MCNC: 1.2 MG/DL (ref 0.1–1)
BLD GP AB SCN CELLS X3 SERPL QL: NORMAL
BLD PROD TYP BPU: NORMAL
BLOOD UNIT EXPIRATION DATE: NORMAL
BLOOD UNIT TYPE CODE: 5100
BLOOD UNIT TYPE: NORMAL
BNP SERPL-MCNC: 28 PG/ML (ref 0–99)
BUN SERPL-MCNC: 6 MG/DL (ref 6–20)
CALCIUM SERPL-MCNC: 9.4 MG/DL (ref 8.7–10.5)
CHLORIDE SERPL-SCNC: 106 MMOL/L (ref 95–110)
CO2 SERPL-SCNC: 19 MMOL/L (ref 23–29)
CODING SYSTEM: NORMAL
CREAT SERPL-MCNC: 0.7 MG/DL (ref 0.5–1.4)
D DIMER PPP IA.FEU-MCNC: 2.83 MG/L FEU
DIFFERENTIAL METHOD: ABNORMAL
DISPENSE STATUS: NORMAL
EOSINOPHIL # BLD AUTO: 0.1 K/UL (ref 0–0.5)
EOSINOPHIL NFR BLD: 1 % (ref 0–8)
ERYTHROCYTE [DISTWIDTH] IN BLOOD BY AUTOMATED COUNT: 20.4 % (ref 11.5–14.5)
EST. GFR  (AFRICAN AMERICAN): >60 ML/MIN/1.73 M^2
EST. GFR  (NON AFRICAN AMERICAN): >60 ML/MIN/1.73 M^2
GLUCOSE SERPL-MCNC: 89 MG/DL (ref 70–110)
HCT VFR BLD AUTO: 25.7 % (ref 37–48.5)
HGB BLD-MCNC: 6.3 G/DL (ref 12–16)
IMM GRANULOCYTES # BLD AUTO: 0.03 K/UL (ref 0–0.04)
IMM GRANULOCYTES NFR BLD AUTO: 0.3 % (ref 0–0.5)
LYMPHOCYTES # BLD AUTO: 2 K/UL (ref 1–4.8)
LYMPHOCYTES NFR BLD: 20.6 % (ref 18–48)
MCH RBC QN AUTO: 15.6 PG (ref 27–31)
MCHC RBC AUTO-ENTMCNC: 24.5 G/DL (ref 32–36)
MCV RBC AUTO: 64 FL (ref 82–98)
MONOCYTES # BLD AUTO: 0.8 K/UL (ref 0.3–1)
MONOCYTES NFR BLD: 8.6 % (ref 4–15)
NEUTROPHILS # BLD AUTO: 6.7 K/UL (ref 1.8–7.7)
NEUTROPHILS NFR BLD: 68.9 % (ref 38–73)
NRBC BLD-RTO: 0 /100 WBC
NUM UNITS TRANS PACKED RBC: NORMAL
PLATELET # BLD AUTO: 393 K/UL (ref 150–450)
PMV BLD AUTO: 9.7 FL (ref 9.2–12.9)
POTASSIUM SERPL-SCNC: 3.7 MMOL/L (ref 3.5–5.1)
PROT SERPL-MCNC: 7.6 G/DL (ref 6–8.4)
RBC # BLD AUTO: 4.05 M/UL (ref 4–5.4)
SARS-COV-2 RDRP RESP QL NAA+PROBE: NEGATIVE
SODIUM SERPL-SCNC: 138 MMOL/L (ref 136–145)
TROPONIN I SERPL DL<=0.01 NG/ML-MCNC: 0.01 NG/ML (ref 0–0.03)
WBC # BLD AUTO: 9.77 K/UL (ref 3.9–12.7)

## 2021-10-25 PROCEDURE — 85025 COMPLETE CBC W/AUTO DIFF WBC: CPT | Performed by: EMERGENCY MEDICINE

## 2021-10-25 PROCEDURE — 86900 BLOOD TYPING SEROLOGIC ABO: CPT | Performed by: EMERGENCY MEDICINE

## 2021-10-25 PROCEDURE — 93005 ELECTROCARDIOGRAM TRACING: CPT

## 2021-10-25 PROCEDURE — 86920 COMPATIBILITY TEST SPIN: CPT | Performed by: EMERGENCY MEDICINE

## 2021-10-25 PROCEDURE — 36415 COLL VENOUS BLD VENIPUNCTURE: CPT | Performed by: EMERGENCY MEDICINE

## 2021-10-25 PROCEDURE — 12000002 HC ACUTE/MED SURGE SEMI-PRIVATE ROOM

## 2021-10-25 PROCEDURE — 93010 ELECTROCARDIOGRAM REPORT: CPT | Mod: ,,, | Performed by: INTERNAL MEDICINE

## 2021-10-25 PROCEDURE — U0002 COVID-19 LAB TEST NON-CDC: HCPCS | Performed by: EMERGENCY MEDICINE

## 2021-10-25 PROCEDURE — P9016 RBC LEUKOCYTES REDUCED: HCPCS | Performed by: EMERGENCY MEDICINE

## 2021-10-25 PROCEDURE — 99291 CRITICAL CARE FIRST HOUR: CPT | Mod: 25

## 2021-10-25 PROCEDURE — 80053 COMPREHEN METABOLIC PANEL: CPT | Performed by: EMERGENCY MEDICINE

## 2021-10-25 PROCEDURE — 85379 FIBRIN DEGRADATION QUANT: CPT | Performed by: EMERGENCY MEDICINE

## 2021-10-25 PROCEDURE — 63600175 PHARM REV CODE 636 W HCPCS: Performed by: EMERGENCY MEDICINE

## 2021-10-25 PROCEDURE — 86920 COMPATIBILITY TEST SPIN: CPT | Performed by: NURSE PRACTITIONER

## 2021-10-25 PROCEDURE — 36430 TRANSFUSION BLD/BLD COMPNT: CPT

## 2021-10-25 PROCEDURE — 84484 ASSAY OF TROPONIN QUANT: CPT | Performed by: EMERGENCY MEDICINE

## 2021-10-25 PROCEDURE — 25500020 PHARM REV CODE 255

## 2021-10-25 PROCEDURE — 93010 EKG 12-LEAD: ICD-10-PCS | Mod: ,,, | Performed by: INTERNAL MEDICINE

## 2021-10-25 PROCEDURE — 83880 ASSAY OF NATRIURETIC PEPTIDE: CPT | Performed by: EMERGENCY MEDICINE

## 2021-10-25 RX ORDER — ENOXAPARIN SODIUM 150 MG/ML
1 INJECTION SUBCUTANEOUS
Status: COMPLETED | OUTPATIENT
Start: 2021-10-25 | End: 2021-10-25

## 2021-10-25 RX ORDER — HYDROCODONE BITARTRATE AND ACETAMINOPHEN 500; 5 MG/1; MG/1
TABLET ORAL
Status: DISCONTINUED | OUTPATIENT
Start: 2021-10-26 | End: 2021-10-27 | Stop reason: HOSPADM

## 2021-10-25 RX ORDER — HYDROCODONE BITARTRATE AND ACETAMINOPHEN 500; 5 MG/1; MG/1
TABLET ORAL
Status: DISCONTINUED | OUTPATIENT
Start: 2021-10-25 | End: 2021-10-27 | Stop reason: HOSPADM

## 2021-10-25 RX ADMIN — ENOXAPARIN SODIUM 120 MG: 120 INJECTION SUBCUTANEOUS at 11:10

## 2021-10-25 RX ADMIN — IOHEXOL 75 ML: 350 INJECTION, SOLUTION INTRAVENOUS at 10:10

## 2021-10-26 PROBLEM — Z30.41 ORAL CONTRACEPTIVE USE: Status: ACTIVE | Noted: 2021-10-26

## 2021-10-26 LAB
ALBUMIN SERPL BCP-MCNC: 2.9 G/DL (ref 3.5–5.2)
ALP SERPL-CCNC: 80 U/L (ref 55–135)
ALT SERPL W/O P-5'-P-CCNC: 13 U/L (ref 10–44)
ANION GAP SERPL CALC-SCNC: 11 MMOL/L (ref 8–16)
AORTIC ROOT ANNULUS: 2.77 CM
AORTIC VALVE CUSP SEPERATION: 2.1 CM
APTT BLDCRRT: 27.3 SEC (ref 21–32)
AST SERPL-CCNC: 12 U/L (ref 10–40)
AV INDEX (PROSTH): 0.87
AV MEAN GRADIENT: 4 MMHG
AV PEAK GRADIENT: 7 MMHG
AV VALVE AREA: 3.23 CM2
AV VELOCITY RATIO: 0.77
BASOPHILS # BLD AUTO: 0.08 K/UL (ref 0–0.2)
BASOPHILS # BLD AUTO: 0.09 K/UL (ref 0–0.2)
BASOPHILS NFR BLD: 0.8 % (ref 0–1.9)
BASOPHILS NFR BLD: 0.8 % (ref 0–1.9)
BILIRUB SERPL-MCNC: 1.1 MG/DL (ref 0.1–1)
BLD PROD TYP BPU: NORMAL
BLOOD UNIT EXPIRATION DATE: NORMAL
BLOOD UNIT TYPE CODE: 5100
BLOOD UNIT TYPE: NORMAL
BSA FOR ECHO PROCEDURE: 2.3 M2
BUN SERPL-MCNC: 6 MG/DL (ref 6–20)
CALCIUM SERPL-MCNC: 9 MG/DL (ref 8.7–10.5)
CHLORIDE SERPL-SCNC: 110 MMOL/L (ref 95–110)
CO2 SERPL-SCNC: 17 MMOL/L (ref 23–29)
CODING SYSTEM: NORMAL
CREAT SERPL-MCNC: 0.7 MG/DL (ref 0.5–1.4)
CV ECHO LV RWT: 0.35 CM
DIFFERENTIAL METHOD: ABNORMAL
DIFFERENTIAL METHOD: ABNORMAL
DISPENSE STATUS: NORMAL
DOP CALC AO PEAK VEL: 1.31 M/S
DOP CALC AO VTI: 23.92 CM
DOP CALC LVOT AREA: 3.7 CM2
DOP CALC LVOT DIAMETER: 2.18 CM
DOP CALC LVOT PEAK VEL: 1.01 M/S
DOP CALC LVOT STROKE VOLUME: 77.26 CM3
DOP CALC MV VTI: 23.53 CM
DOP CALCLVOT PEAK VEL VTI: 20.71 CM
E WAVE DECELERATION TIME: 172.9 MSEC
E/A RATIO: 1.47
E/E' RATIO: 8.58 M/S
ECHO LV POSTERIOR WALL: 0.87 CM (ref 0.6–1.1)
EJECTION FRACTION: 60 %
EOSINOPHIL # BLD AUTO: 0.1 K/UL (ref 0–0.5)
EOSINOPHIL # BLD AUTO: 0.2 K/UL (ref 0–0.5)
EOSINOPHIL NFR BLD: 1 % (ref 0–8)
EOSINOPHIL NFR BLD: 2 % (ref 0–8)
ERYTHROCYTE [DISTWIDTH] IN BLOOD BY AUTOMATED COUNT: 23.5 % (ref 11.5–14.5)
ERYTHROCYTE [DISTWIDTH] IN BLOOD BY AUTOMATED COUNT: 26.1 % (ref 11.5–14.5)
EST. GFR  (AFRICAN AMERICAN): >60 ML/MIN/1.73 M^2
EST. GFR  (NON AFRICAN AMERICAN): >60 ML/MIN/1.73 M^2
FERRITIN SERPL-MCNC: 8 NG/ML (ref 20–300)
FRACTIONAL SHORTENING: 31 % (ref 28–44)
GLUCOSE SERPL-MCNC: 137 MG/DL (ref 70–110)
HCT VFR BLD AUTO: 25.8 % (ref 37–48.5)
HCT VFR BLD AUTO: 30.8 % (ref 37–48.5)
HGB BLD-MCNC: 6.7 G/DL (ref 12–16)
HGB BLD-MCNC: 8.4 G/DL (ref 12–16)
IMM GRANULOCYTES # BLD AUTO: 0.03 K/UL (ref 0–0.04)
IMM GRANULOCYTES # BLD AUTO: 0.03 K/UL (ref 0–0.04)
IMM GRANULOCYTES NFR BLD AUTO: 0.3 % (ref 0–0.5)
IMM GRANULOCYTES NFR BLD AUTO: 0.3 % (ref 0–0.5)
INR PPP: 1 (ref 0.8–1.2)
INTERVENTRICULAR SEPTUM: 0.93 CM (ref 0.6–1.1)
IRON SERPL-MCNC: 11 UG/DL (ref 30–160)
IVRT: 91.34 MSEC
LA MAJOR: 5.9 CM
LA WIDTH: 3.21 CM
LEFT ATRIUM SIZE: 3.29 CM
LEFT ATRIUM VOLUME INDEX MOD: 21.7 ML/M2
LEFT ATRIUM VOLUME MOD: 47.81 CM3
LEFT INTERNAL DIMENSION IN SYSTOLE: 3.43 CM (ref 2.1–4)
LEFT VENTRICLE DIASTOLIC VOLUME INDEX: 52.35 ML/M2
LEFT VENTRICLE DIASTOLIC VOLUME: 115.16 ML
LEFT VENTRICLE MASS INDEX: 70 G/M2
LEFT VENTRICLE SYSTOLIC VOLUME INDEX: 22 ML/M2
LEFT VENTRICLE SYSTOLIC VOLUME: 48.41 ML
LEFT VENTRICULAR INTERNAL DIMENSION IN DIASTOLE: 4.94 CM (ref 3.5–6)
LEFT VENTRICULAR MASS: 155.04 G
LV LATERAL E/E' RATIO: 6.87 M/S
LV SEPTAL E/E' RATIO: 11.44 M/S
LYMPHOCYTES # BLD AUTO: 3.3 K/UL (ref 1–4.8)
LYMPHOCYTES # BLD AUTO: 3.3 K/UL (ref 1–4.8)
LYMPHOCYTES NFR BLD: 30.3 % (ref 18–48)
LYMPHOCYTES NFR BLD: 32.5 % (ref 18–48)
MAGNESIUM SERPL-MCNC: 2.1 MG/DL (ref 1.6–2.6)
MCH RBC QN AUTO: 17 PG (ref 27–31)
MCH RBC QN AUTO: 18.8 PG (ref 27–31)
MCHC RBC AUTO-ENTMCNC: 26 G/DL (ref 32–36)
MCHC RBC AUTO-ENTMCNC: 27.3 G/DL (ref 32–36)
MCV RBC AUTO: 66 FL (ref 82–98)
MCV RBC AUTO: 69 FL (ref 82–98)
MONOCYTES # BLD AUTO: 0.7 K/UL (ref 0.3–1)
MONOCYTES # BLD AUTO: 0.8 K/UL (ref 0.3–1)
MONOCYTES NFR BLD: 6.3 % (ref 4–15)
MONOCYTES NFR BLD: 7.8 % (ref 4–15)
MV A" WAVE DURATION": 11.42 MSEC
MV MEAN GRADIENT: 1 MMHG
MV PEAK A VEL: 0.7 M/S
MV PEAK E VEL: 1.03 M/S
MV PEAK GRADIENT: 5 MMHG
MV STENOSIS PRESSURE HALF TIME: 50.14 MS
MV VALVE AREA BY CONTINUITY EQUATION: 3.28 CM2
MV VALVE AREA P 1/2 METHOD: 4.39 CM2
NEUTROPHILS # BLD AUTO: 5.7 K/UL (ref 1.8–7.7)
NEUTROPHILS # BLD AUTO: 6.7 K/UL (ref 1.8–7.7)
NEUTROPHILS NFR BLD: 56.6 % (ref 38–73)
NEUTROPHILS NFR BLD: 61.3 % (ref 38–73)
NRBC BLD-RTO: 0 /100 WBC
NRBC BLD-RTO: 0 /100 WBC
NUM UNITS TRANS PACKED RBC: NORMAL
PHOSPHATE SERPL-MCNC: 2.8 MG/DL (ref 2.7–4.5)
PISA MRMAX VEL: 0.04 M/S
PISA TR MAX VEL: 3.12 M/S
PLATELET # BLD AUTO: 359 K/UL (ref 150–450)
PLATELET # BLD AUTO: 375 K/UL (ref 150–450)
PMV BLD AUTO: 9.1 FL (ref 9.2–12.9)
PMV BLD AUTO: 9.8 FL (ref 9.2–12.9)
POTASSIUM SERPL-SCNC: 3.5 MMOL/L (ref 3.5–5.1)
PROCALCITONIN SERPL IA-MCNC: 0.04 NG/ML
PROT SERPL-MCNC: 6.9 G/DL (ref 6–8.4)
PROTHROMBIN TIME: 10.8 SEC (ref 9–12.5)
PULM VEIN S/D RATIO: 1.18
PV PEAK D VEL: 0.72 M/S
PV PEAK S VEL: 0.85 M/S
PV PEAK VELOCITY: 1.22 CM/S
RA MAJOR: 4.53 CM
RA PRESSURE: 3 MMHG
RA WIDTH: 3.56 CM
RBC # BLD AUTO: 3.94 M/UL (ref 4–5.4)
RBC # BLD AUTO: 4.47 M/UL (ref 4–5.4)
RIGHT VENTRICULAR END-DIASTOLIC DIMENSION: 2.94 CM
RV TISSUE DOPPLER FREE WALL SYSTOLIC VELOCITY 1 (APICAL 4 CHAMBER VIEW): 14.57 CM/S
SATURATED IRON: 2 % (ref 20–50)
SODIUM SERPL-SCNC: 138 MMOL/L (ref 136–145)
TDI LATERAL: 0.15 M/S
TDI SEPTAL: 0.09 M/S
TDI: 0.12 M/S
TOTAL IRON BINDING CAPACITY: 543 UG/DL (ref 250–450)
TR MAX PG: 39 MMHG
TRANSFERRIN SERPL-MCNC: 367 MG/DL (ref 200–375)
TRANSFERRIN SERPL-MCNC: 367 MG/DL (ref 200–375)
TRICUSPID ANNULAR PLANE SYSTOLIC EXCURSION: 3.32 CM
TV REST PULMONARY ARTERY PRESSURE: 42 MMHG
WBC # BLD AUTO: 10.06 K/UL (ref 3.9–12.7)
WBC # BLD AUTO: 10.87 K/UL (ref 3.9–12.7)

## 2021-10-26 PROCEDURE — 83735 ASSAY OF MAGNESIUM: CPT | Performed by: NURSE PRACTITIONER

## 2021-10-26 PROCEDURE — 25000003 PHARM REV CODE 250: Performed by: NURSE PRACTITIONER

## 2021-10-26 PROCEDURE — 85025 COMPLETE CBC W/AUTO DIFF WBC: CPT | Mod: 91 | Performed by: INTERNAL MEDICINE

## 2021-10-26 PROCEDURE — 63600175 PHARM REV CODE 636 W HCPCS: Performed by: NURSE PRACTITIONER

## 2021-10-26 PROCEDURE — 85025 COMPLETE CBC W/AUTO DIFF WBC: CPT | Performed by: NURSE PRACTITIONER

## 2021-10-26 PROCEDURE — 11000001 HC ACUTE MED/SURG PRIVATE ROOM

## 2021-10-26 PROCEDURE — 99222 1ST HOSP IP/OBS MODERATE 55: CPT | Mod: ,,, | Performed by: INTERNAL MEDICINE

## 2021-10-26 PROCEDURE — 63600175 PHARM REV CODE 636 W HCPCS: Performed by: INTERNAL MEDICINE

## 2021-10-26 PROCEDURE — 84466 ASSAY OF TRANSFERRIN: CPT | Performed by: NURSE PRACTITIONER

## 2021-10-26 PROCEDURE — 36415 COLL VENOUS BLD VENIPUNCTURE: CPT | Performed by: NURSE PRACTITIONER

## 2021-10-26 PROCEDURE — 84145 PROCALCITONIN (PCT): CPT | Performed by: NURSE PRACTITIONER

## 2021-10-26 PROCEDURE — 85610 PROTHROMBIN TIME: CPT | Performed by: NURSE PRACTITIONER

## 2021-10-26 PROCEDURE — 25000003 PHARM REV CODE 250: Performed by: INTERNAL MEDICINE

## 2021-10-26 PROCEDURE — 27000221 HC OXYGEN, UP TO 24 HOURS

## 2021-10-26 PROCEDURE — 82728 ASSAY OF FERRITIN: CPT | Performed by: NURSE PRACTITIONER

## 2021-10-26 PROCEDURE — 99222 PR INITIAL HOSPITAL CARE,LEVL II: ICD-10-PCS | Mod: ,,, | Performed by: INTERNAL MEDICINE

## 2021-10-26 PROCEDURE — 99900035 HC TECH TIME PER 15 MIN (STAT)

## 2021-10-26 PROCEDURE — 85730 THROMBOPLASTIN TIME PARTIAL: CPT | Performed by: NURSE PRACTITIONER

## 2021-10-26 PROCEDURE — 36415 COLL VENOUS BLD VENIPUNCTURE: CPT | Performed by: INTERNAL MEDICINE

## 2021-10-26 PROCEDURE — 94761 N-INVAS EAR/PLS OXIMETRY MLT: CPT

## 2021-10-26 PROCEDURE — 80053 COMPREHEN METABOLIC PANEL: CPT | Performed by: NURSE PRACTITIONER

## 2021-10-26 PROCEDURE — 84100 ASSAY OF PHOSPHORUS: CPT | Performed by: NURSE PRACTITIONER

## 2021-10-26 PROCEDURE — P9016 RBC LEUKOCYTES REDUCED: HCPCS | Performed by: NURSE PRACTITIONER

## 2021-10-26 RX ORDER — SODIUM CHLORIDE 0.9 % (FLUSH) 0.9 %
3 SYRINGE (ML) INJECTION EVERY 12 HOURS PRN
Status: DISCONTINUED | OUTPATIENT
Start: 2021-10-26 | End: 2021-10-27 | Stop reason: HOSPADM

## 2021-10-26 RX ORDER — SERTRALINE HYDROCHLORIDE 50 MG/1
50 TABLET, FILM COATED ORAL DAILY
Status: DISCONTINUED | OUTPATIENT
Start: 2021-10-26 | End: 2021-10-27 | Stop reason: HOSPADM

## 2021-10-26 RX ORDER — NALOXONE HCL 0.4 MG/ML
0.02 VIAL (ML) INJECTION
Status: DISCONTINUED | OUTPATIENT
Start: 2021-10-26 | End: 2021-10-27 | Stop reason: HOSPADM

## 2021-10-26 RX ORDER — ACETAMINOPHEN 325 MG/1
650 TABLET ORAL EVERY 4 HOURS PRN
Status: DISCONTINUED | OUTPATIENT
Start: 2021-10-26 | End: 2021-10-27 | Stop reason: HOSPADM

## 2021-10-26 RX ORDER — GLUCAGON 1 MG
1 KIT INJECTION
Status: DISCONTINUED | OUTPATIENT
Start: 2021-10-26 | End: 2021-10-27 | Stop reason: HOSPADM

## 2021-10-26 RX ORDER — IBUPROFEN 200 MG
16 TABLET ORAL
Status: DISCONTINUED | OUTPATIENT
Start: 2021-10-26 | End: 2021-10-27 | Stop reason: HOSPADM

## 2021-10-26 RX ORDER — LANOLIN ALCOHOL/MO/W.PET/CERES
800 CREAM (GRAM) TOPICAL
Status: DISCONTINUED | OUTPATIENT
Start: 2021-10-26 | End: 2021-10-27 | Stop reason: HOSPADM

## 2021-10-26 RX ORDER — SODIUM,POTASSIUM PHOSPHATES 280-250MG
2 POWDER IN PACKET (EA) ORAL
Status: DISCONTINUED | OUTPATIENT
Start: 2021-10-26 | End: 2021-10-27 | Stop reason: HOSPADM

## 2021-10-26 RX ORDER — MAG HYDROX/ALUMINUM HYD/SIMETH 200-200-20
30 SUSPENSION, ORAL (FINAL DOSE FORM) ORAL 4 TIMES DAILY PRN
Status: DISCONTINUED | OUTPATIENT
Start: 2021-10-26 | End: 2021-10-27 | Stop reason: HOSPADM

## 2021-10-26 RX ORDER — SIMETHICONE 80 MG
1 TABLET,CHEWABLE ORAL 4 TIMES DAILY PRN
Status: DISCONTINUED | OUTPATIENT
Start: 2021-10-26 | End: 2021-10-27 | Stop reason: HOSPADM

## 2021-10-26 RX ORDER — ACETAMINOPHEN 325 MG/1
650 TABLET ORAL EVERY 8 HOURS PRN
Status: DISCONTINUED | OUTPATIENT
Start: 2021-10-26 | End: 2021-10-27 | Stop reason: HOSPADM

## 2021-10-26 RX ORDER — IPRATROPIUM BROMIDE AND ALBUTEROL SULFATE 2.5; .5 MG/3ML; MG/3ML
3 SOLUTION RESPIRATORY (INHALATION) EVERY 6 HOURS PRN
Status: DISCONTINUED | OUTPATIENT
Start: 2021-10-26 | End: 2021-10-27 | Stop reason: HOSPADM

## 2021-10-26 RX ORDER — IBUPROFEN 200 MG
24 TABLET ORAL
Status: DISCONTINUED | OUTPATIENT
Start: 2021-10-26 | End: 2021-10-27 | Stop reason: HOSPADM

## 2021-10-26 RX ORDER — ONDANSETRON 2 MG/ML
8 INJECTION INTRAMUSCULAR; INTRAVENOUS EVERY 8 HOURS PRN
Status: DISCONTINUED | OUTPATIENT
Start: 2021-10-26 | End: 2021-10-27 | Stop reason: HOSPADM

## 2021-10-26 RX ORDER — HYDROCODONE BITARTRATE AND ACETAMINOPHEN 500; 5 MG/1; MG/1
TABLET ORAL
Status: DISCONTINUED | OUTPATIENT
Start: 2021-10-26 | End: 2021-10-27 | Stop reason: HOSPADM

## 2021-10-26 RX ORDER — ENOXAPARIN SODIUM 150 MG/ML
1 INJECTION SUBCUTANEOUS
Status: DISCONTINUED | OUTPATIENT
Start: 2021-10-26 | End: 2021-10-27

## 2021-10-26 RX ORDER — TALC
9 POWDER (GRAM) TOPICAL NIGHTLY PRN
Status: DISCONTINUED | OUTPATIENT
Start: 2021-10-26 | End: 2021-10-27 | Stop reason: HOSPADM

## 2021-10-26 RX ADMIN — SERTRALINE HYDROCHLORIDE 50 MG: 50 TABLET ORAL at 08:10

## 2021-10-26 RX ADMIN — ENOXAPARIN SODIUM 120 MG: 120 INJECTION SUBCUTANEOUS at 10:10

## 2021-10-26 RX ADMIN — MULTIPLE VITAMINS W/ MINERALS TAB 1 TABLET: TAB at 08:10

## 2021-10-26 RX ADMIN — ENOXAPARIN SODIUM 120 MG: 120 INJECTION SUBCUTANEOUS at 12:10

## 2021-10-26 RX ADMIN — IRON SUCROSE 200 MG: 20 INJECTION, SOLUTION INTRAVENOUS at 04:10

## 2021-10-27 VITALS
HEIGHT: 66 IN | DIASTOLIC BLOOD PRESSURE: 62 MMHG | BODY MASS INDEX: 40.18 KG/M2 | OXYGEN SATURATION: 98 % | TEMPERATURE: 97 F | HEART RATE: 76 BPM | RESPIRATION RATE: 18 BRPM | SYSTOLIC BLOOD PRESSURE: 103 MMHG | WEIGHT: 250 LBS

## 2021-10-27 LAB
ALBUMIN SERPL BCP-MCNC: 2.8 G/DL (ref 3.5–5.2)
ALP SERPL-CCNC: 81 U/L (ref 55–135)
ALT SERPL W/O P-5'-P-CCNC: 13 U/L (ref 10–44)
ANION GAP SERPL CALC-SCNC: 9 MMOL/L (ref 8–16)
AST SERPL-CCNC: 10 U/L (ref 10–40)
BASOPHILS # BLD AUTO: 0.07 K/UL (ref 0–0.2)
BASOPHILS NFR BLD: 0.8 % (ref 0–1.9)
BILIRUB SERPL-MCNC: 1 MG/DL (ref 0.1–1)
BUN SERPL-MCNC: 4 MG/DL (ref 6–20)
CALCIUM SERPL-MCNC: 9 MG/DL (ref 8.7–10.5)
CHLORIDE SERPL-SCNC: 111 MMOL/L (ref 95–110)
CO2 SERPL-SCNC: 19 MMOL/L (ref 23–29)
CREAT SERPL-MCNC: 0.6 MG/DL (ref 0.5–1.4)
DIFFERENTIAL METHOD: ABNORMAL
EOSINOPHIL # BLD AUTO: 0.3 K/UL (ref 0–0.5)
EOSINOPHIL NFR BLD: 3.5 % (ref 0–8)
ERYTHROCYTE [DISTWIDTH] IN BLOOD BY AUTOMATED COUNT: 25.6 % (ref 11.5–14.5)
EST. GFR  (AFRICAN AMERICAN): >60 ML/MIN/1.73 M^2
EST. GFR  (NON AFRICAN AMERICAN): >60 ML/MIN/1.73 M^2
GLUCOSE SERPL-MCNC: 105 MG/DL (ref 70–110)
HCT VFR BLD AUTO: 31.4 % (ref 37–48.5)
HGB BLD-MCNC: 8.4 G/DL (ref 12–16)
IMM GRANULOCYTES # BLD AUTO: 0.02 K/UL (ref 0–0.04)
IMM GRANULOCYTES NFR BLD AUTO: 0.2 % (ref 0–0.5)
LYMPHOCYTES # BLD AUTO: 2.8 K/UL (ref 1–4.8)
LYMPHOCYTES NFR BLD: 33.3 % (ref 18–48)
MAGNESIUM SERPL-MCNC: 2.1 MG/DL (ref 1.6–2.6)
MCH RBC QN AUTO: 18.4 PG (ref 27–31)
MCHC RBC AUTO-ENTMCNC: 26.8 G/DL (ref 32–36)
MCV RBC AUTO: 69 FL (ref 82–98)
MONOCYTES # BLD AUTO: 0.7 K/UL (ref 0.3–1)
MONOCYTES NFR BLD: 8 % (ref 4–15)
NEUTROPHILS # BLD AUTO: 4.5 K/UL (ref 1.8–7.7)
NEUTROPHILS NFR BLD: 54.2 % (ref 38–73)
NRBC BLD-RTO: 0 /100 WBC
PHOSPHATE SERPL-MCNC: 3.1 MG/DL (ref 2.7–4.5)
PLATELET # BLD AUTO: 367 K/UL (ref 150–450)
PMV BLD AUTO: 9.8 FL (ref 9.2–12.9)
POTASSIUM SERPL-SCNC: 3.7 MMOL/L (ref 3.5–5.1)
PROT SERPL-MCNC: 6.7 G/DL (ref 6–8.4)
RBC # BLD AUTO: 4.57 M/UL (ref 4–5.4)
SODIUM SERPL-SCNC: 139 MMOL/L (ref 136–145)
WBC # BLD AUTO: 8.36 K/UL (ref 3.9–12.7)

## 2021-10-27 PROCEDURE — 85025 COMPLETE CBC W/AUTO DIFF WBC: CPT | Performed by: NURSE PRACTITIONER

## 2021-10-27 PROCEDURE — 36415 COLL VENOUS BLD VENIPUNCTURE: CPT | Performed by: NURSE PRACTITIONER

## 2021-10-27 PROCEDURE — 25000003 PHARM REV CODE 250: Performed by: INTERNAL MEDICINE

## 2021-10-27 PROCEDURE — 94618 PULMONARY STRESS TESTING: CPT

## 2021-10-27 PROCEDURE — 84100 ASSAY OF PHOSPHORUS: CPT | Performed by: NURSE PRACTITIONER

## 2021-10-27 PROCEDURE — 63600175 PHARM REV CODE 636 W HCPCS: Performed by: INTERNAL MEDICINE

## 2021-10-27 PROCEDURE — 80053 COMPREHEN METABOLIC PANEL: CPT | Performed by: NURSE PRACTITIONER

## 2021-10-27 PROCEDURE — 25000003 PHARM REV CODE 250: Performed by: NURSE PRACTITIONER

## 2021-10-27 PROCEDURE — 94761 N-INVAS EAR/PLS OXIMETRY MLT: CPT

## 2021-10-27 PROCEDURE — 83735 ASSAY OF MAGNESIUM: CPT | Performed by: NURSE PRACTITIONER

## 2021-10-27 PROCEDURE — 99900035 HC TECH TIME PER 15 MIN (STAT)

## 2021-10-27 RX ORDER — LANOLIN ALCOHOL/MO/W.PET/CERES
1 CREAM (GRAM) TOPICAL 2 TIMES DAILY
Qty: 90 TABLET | Refills: 0 | Status: SHIPPED | OUTPATIENT
Start: 2021-10-27 | End: 2022-06-08

## 2021-10-27 RX ADMIN — MULTIPLE VITAMINS W/ MINERALS TAB 1 TABLET: TAB at 09:10

## 2021-10-27 RX ADMIN — IRON SUCROSE 200 MG: 20 INJECTION, SOLUTION INTRAVENOUS at 09:10

## 2021-10-27 RX ADMIN — APIXABAN 10 MG: 2.5 TABLET, FILM COATED ORAL at 09:10

## 2021-10-27 RX ADMIN — SERTRALINE HYDROCHLORIDE 50 MG: 50 TABLET ORAL at 09:10

## 2021-10-28 ENCOUNTER — TELEPHONE (OUTPATIENT)
Dept: MEDSURG UNIT | Facility: HOSPITAL | Age: 21
End: 2021-10-28
Payer: COMMERCIAL

## 2021-10-28 ENCOUNTER — PATIENT OUTREACH (OUTPATIENT)
Dept: FAMILY MEDICINE | Facility: CLINIC | Age: 21
End: 2021-10-28
Payer: COMMERCIAL

## 2021-11-02 ENCOUNTER — OFFICE VISIT (OUTPATIENT)
Dept: FAMILY MEDICINE | Facility: CLINIC | Age: 21
End: 2021-11-02
Payer: COMMERCIAL

## 2021-11-02 VITALS
TEMPERATURE: 98 F | SYSTOLIC BLOOD PRESSURE: 121 MMHG | HEART RATE: 97 BPM | DIASTOLIC BLOOD PRESSURE: 68 MMHG | WEIGHT: 230 LBS | RESPIRATION RATE: 16 BRPM | OXYGEN SATURATION: 97 % | BODY MASS INDEX: 37.12 KG/M2

## 2021-11-02 DIAGNOSIS — Z00.00 PREVENTATIVE HEALTH CARE: ICD-10-CM

## 2021-11-02 DIAGNOSIS — D50.0 IRON DEFICIENCY ANEMIA DUE TO CHRONIC BLOOD LOSS: ICD-10-CM

## 2021-11-02 DIAGNOSIS — I26.01 ACUTE SEPTIC PULMONARY EMBOLISM WITH ACUTE COR PULMONALE: Primary | ICD-10-CM

## 2021-11-02 DIAGNOSIS — D50.8 OTHER IRON DEFICIENCY ANEMIA: ICD-10-CM

## 2021-11-02 PROBLEM — J06.9 UPPER RESPIRATORY TRACT INFECTION: Status: RESOLVED | Noted: 2019-12-17 | Resolved: 2021-11-02

## 2021-11-02 PROBLEM — Z30.41 ORAL CONTRACEPTIVE USE: Status: RESOLVED | Noted: 2021-10-26 | Resolved: 2021-11-02

## 2021-11-02 PROCEDURE — 3008F BODY MASS INDEX DOCD: CPT | Mod: S$GLB,,, | Performed by: FAMILY MEDICINE

## 2021-11-02 PROCEDURE — 99496 TRANSITIONAL CARE MANAGE SERVICE 7 DAY DISCHARGE: ICD-10-PCS | Mod: S$GLB,,, | Performed by: FAMILY MEDICINE

## 2021-11-02 PROCEDURE — 99496 TRANSJ CARE MGMT HIGH F2F 7D: CPT | Mod: S$GLB,,, | Performed by: FAMILY MEDICINE

## 2021-11-02 PROCEDURE — 3008F PR BODY MASS INDEX (BMI) DOCUMENTED: ICD-10-PCS | Mod: S$GLB,,, | Performed by: FAMILY MEDICINE

## 2021-11-07 LAB
AT III ACT/NOR PPP CHRO: 188 % NORMAL (ref 80–135)
CHOLEST SERPL-MCNC: 170 MG/DL
CHOLEST/HDLC SERPL: 4.1 (CALC)
F2 C.20210G>A GENO BLD/T: NEGATIVE
F2 GENE MUT ANL BLD/T: NORMAL
F5 GENE MUT ANL BLD/T: NORMAL
F5 P.R506Q BLD/T QL: NEGATIVE
HCV AB S/CO SERPL IA: 0.01
HCV AB SERPL QL IA: NORMAL
HDLC SERPL-MCNC: 41 MG/DL
LDLC SERPL CALC-MCNC: 109 MG/DL (CALC)
NONHDLC SERPL-MCNC: 129 MG/DL (CALC)
PROT C ACT/NOR PPP: 152 % NORMAL (ref 70–180)
PROT S AG ACT/NOR PPP IA: 104 % NORMAL (ref 70–140)
PROT S FREE AG ACT/NOR PPP IA: 104 % NORMAL (ref 50–147)
TRIGL SERPL-MCNC: 107 MG/DL

## 2021-11-15 ENCOUNTER — OFFICE VISIT (OUTPATIENT)
Dept: URGENT CARE | Facility: CLINIC | Age: 21
End: 2021-11-15
Payer: COMMERCIAL

## 2021-11-15 VITALS
OXYGEN SATURATION: 99 % | RESPIRATION RATE: 16 BRPM | WEIGHT: 237 LBS | TEMPERATURE: 100 F | DIASTOLIC BLOOD PRESSURE: 74 MMHG | SYSTOLIC BLOOD PRESSURE: 119 MMHG | HEIGHT: 65 IN | HEART RATE: 107 BPM | BODY MASS INDEX: 39.49 KG/M2

## 2021-11-15 DIAGNOSIS — Z20.822 ENCOUNTER FOR LABORATORY TESTING FOR COVID-19 VIRUS: ICD-10-CM

## 2021-11-15 PROCEDURE — 3008F BODY MASS INDEX DOCD: CPT | Mod: CPTII,S$GLB,, | Performed by: NURSE PRACTITIONER

## 2021-11-15 PROCEDURE — 3074F SYST BP LT 130 MM HG: CPT | Mod: CPTII,S$GLB,, | Performed by: NURSE PRACTITIONER

## 2021-11-15 PROCEDURE — 3078F PR MOST RECENT DIASTOLIC BLOOD PRESSURE < 80 MM HG: ICD-10-PCS | Mod: CPTII,S$GLB,, | Performed by: NURSE PRACTITIONER

## 2021-11-15 PROCEDURE — 3078F DIAST BP <80 MM HG: CPT | Mod: CPTII,S$GLB,, | Performed by: NURSE PRACTITIONER

## 2021-11-15 PROCEDURE — 1160F RVW MEDS BY RX/DR IN RCRD: CPT | Mod: CPTII,S$GLB,, | Performed by: NURSE PRACTITIONER

## 2021-11-15 PROCEDURE — 99202 OFFICE O/P NEW SF 15 MIN: CPT | Mod: S$GLB,,, | Performed by: NURSE PRACTITIONER

## 2021-11-15 PROCEDURE — 3008F PR BODY MASS INDEX (BMI) DOCUMENTED: ICD-10-PCS | Mod: CPTII,S$GLB,, | Performed by: NURSE PRACTITIONER

## 2021-11-15 PROCEDURE — 1160F PR REVIEW ALL MEDS BY PRESCRIBER/CLIN PHARMACIST DOCUMENTED: ICD-10-PCS | Mod: CPTII,S$GLB,, | Performed by: NURSE PRACTITIONER

## 2021-11-15 PROCEDURE — 3074F PR MOST RECENT SYSTOLIC BLOOD PRESSURE < 130 MM HG: ICD-10-PCS | Mod: CPTII,S$GLB,, | Performed by: NURSE PRACTITIONER

## 2021-11-15 PROCEDURE — U0005 INFEC AGEN DETEC AMPLI PROBE: HCPCS | Performed by: NURSE PRACTITIONER

## 2021-11-15 PROCEDURE — 99202 PR OFFICE/OUTPT VISIT, NEW, LEVL II, 15-29 MIN: ICD-10-PCS | Mod: S$GLB,,, | Performed by: NURSE PRACTITIONER

## 2021-11-15 PROCEDURE — 1159F PR MEDICATION LIST DOCUMENTED IN MEDICAL RECORD: ICD-10-PCS | Mod: CPTII,S$GLB,, | Performed by: NURSE PRACTITIONER

## 2021-11-15 PROCEDURE — 1159F MED LIST DOCD IN RCRD: CPT | Mod: CPTII,S$GLB,, | Performed by: NURSE PRACTITIONER

## 2021-11-15 PROCEDURE — U0003 INFECTIOUS AGENT DETECTION BY NUCLEIC ACID (DNA OR RNA); SEVERE ACUTE RESPIRATORY SYNDROME CORONAVIRUS 2 (SARS-COV-2) (CORONAVIRUS DISEASE [COVID-19]), AMPLIFIED PROBE TECHNIQUE, MAKING USE OF HIGH THROUGHPUT TECHNOLOGIES AS DESCRIBED BY CMS-2020-01-R: HCPCS | Performed by: NURSE PRACTITIONER

## 2021-11-16 LAB
SARS-COV-2 RNA RESP QL NAA+PROBE: NOT DETECTED
SARS-COV-2- CYCLE NUMBER: NORMAL

## 2022-06-05 ENCOUNTER — PATIENT MESSAGE (OUTPATIENT)
Dept: FAMILY MEDICINE | Facility: CLINIC | Age: 22
End: 2022-06-05

## 2022-06-06 ENCOUNTER — TELEPHONE (OUTPATIENT)
Dept: FAMILY MEDICINE | Facility: CLINIC | Age: 22
End: 2022-06-06

## 2022-06-06 NOTE — TELEPHONE ENCOUNTER
LOV  11/02/21.  Patient is encouraged to make an appointment because her  legs are slightly warm swollen painful with evidence of maybe horsefly stings. ,

## 2022-06-08 ENCOUNTER — OFFICE VISIT (OUTPATIENT)
Dept: FAMILY MEDICINE | Facility: CLINIC | Age: 22
End: 2022-06-08
Payer: COMMERCIAL

## 2022-06-08 VITALS
WEIGHT: 234.31 LBS | OXYGEN SATURATION: 98 % | SYSTOLIC BLOOD PRESSURE: 120 MMHG | DIASTOLIC BLOOD PRESSURE: 76 MMHG | TEMPERATURE: 99 F | HEART RATE: 97 BPM | HEIGHT: 65 IN | BODY MASS INDEX: 39.04 KG/M2
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
## 2022-06-08 DIAGNOSIS — Z13.31 POSITIVE DEPRESSION SCREENING: ICD-10-CM

## 2022-06-08 DIAGNOSIS — S80.869A INSECT BITE OF LOWER LEG, UNSPECIFIED LATERALITY, INITIAL ENCOUNTER: Primary | ICD-10-CM

## 2022-06-08 PROCEDURE — 3008F BODY MASS INDEX DOCD: CPT | Mod: CPTII,S$GLB,, | Performed by: FAMILY MEDICINE

## 2022-06-08 PROCEDURE — 1159F PR MEDICATION LIST DOCUMENTED IN MEDICAL RECORD: ICD-10-PCS | Mod: CPTII,S$GLB,, | Performed by: FAMILY MEDICINE

## 2022-06-08 PROCEDURE — 90715 TDAP VACCINE GREATER THAN OR EQUAL TO 7YO IM: ICD-10-PCS | Mod: S$GLB,,, | Performed by: FAMILY MEDICINE

## 2022-06-08 PROCEDURE — 90715 TDAP VACCINE 7 YRS/> IM: CPT | Mod: S$GLB,,, | Performed by: FAMILY MEDICINE

## 2022-06-08 PROCEDURE — 90471 IMMUNIZATION ADMIN: CPT | Mod: S$GLB,,, | Performed by: FAMILY MEDICINE

## 2022-06-08 PROCEDURE — 3008F PR BODY MASS INDEX (BMI) DOCUMENTED: ICD-10-PCS | Mod: CPTII,S$GLB,, | Performed by: FAMILY MEDICINE

## 2022-06-08 PROCEDURE — 90471 TDAP VACCINE GREATER THAN OR EQUAL TO 7YO IM: ICD-10-PCS | Mod: S$GLB,,, | Performed by: FAMILY MEDICINE

## 2022-06-08 PROCEDURE — 1159F MED LIST DOCD IN RCRD: CPT | Mod: CPTII,S$GLB,, | Performed by: FAMILY MEDICINE

## 2022-06-08 PROCEDURE — 99214 PR OFFICE/OUTPT VISIT, EST, LEVL IV, 30-39 MIN: ICD-10-PCS | Mod: 25,S$GLB,, | Performed by: FAMILY MEDICINE

## 2022-06-08 PROCEDURE — 99214 OFFICE O/P EST MOD 30 MIN: CPT | Mod: 25,S$GLB,, | Performed by: FAMILY MEDICINE

## 2022-06-08 RX ORDER — HYDROXYZINE HYDROCHLORIDE 25 MG/1
25 TABLET, FILM COATED ORAL NIGHTLY
Qty: 30 TABLET | Refills: 2 | Status: SHIPPED | OUTPATIENT
Start: 2022-06-08 | End: 2022-09-06

## 2022-06-08 NOTE — PROGRESS NOTES
Subjective:       Patient ID: Ricardo Rivera is a 21 y.o. female.    Chief Complaint: Leg Swelling      Bites both lower extremities she has had this occurred on Saturday.  Cannot recall whether they were fire ants or other insects.  Since then both legs have gotten swollen but she denies any fever.  Wt Readings from Last 3 Encounters:  06/08/22 : 106.3 kg (234 lb 4.8 oz)  11/15/21 : 107.5 kg (237 lb)  11/02/21 : 104.3 kg (230 lb)        Insect Bite  This is a new problem. The current episode started in the past 7 days (5dd). The problem occurs constantly. The problem has been gradually improving. Pertinent negatives include no anorexia, arthralgias, fever, nausea, sore throat or vomiting. Nothing aggravates the symptoms. She has tried nothing for the symptoms.       Allergies and Medications:   Review of patient's allergies indicates:  No Known Allergies  Current Outpatient Medications   Medication Sig Dispense Refill    ferrous sulfate (FEOSOL) Tab tablet Take 1 tablet (1 each total) by mouth 2 (two) times daily. 90 tablet 0    hydrOXYzine HCL (ATARAX) 25 MG tablet Take 1 tablet (25 mg total) by mouth every evening. 30 tablet 2    multivit-min/iron/folic acid/K (ADULTS MULTIVITAMIN ORAL) TAKE 1 TABLET BY MOUTH ONCE DAILY.      sertraline (ZOLOFT) 50 MG tablet Take 1 tablet (50 mg total) by mouth once daily. 30 tablet 11     No current facility-administered medications for this visit.       Family History:   Family History   Problem Relation Age of Onset    No Known Problems Mother     No Known Problems Father     Breast cancer Neg Hx     Ovarian cancer Neg Hx        Social History:   Social History     Socioeconomic History    Marital status: Single   Tobacco Use    Smoking status: Never Smoker    Smokeless tobacco: Never Used   Substance and Sexual Activity    Alcohol use: No    Drug use: No    Sexual activity: Yes     Partners: Male       Review of Systems   Constitutional: Negative for  fever.   HENT: Negative for sore throat.    Gastrointestinal: Negative for anorexia, nausea and vomiting.   Musculoskeletal: Negative for arthralgias.       Objective:     Vitals:    06/08/22 1424   BP: 120/76   Pulse: 97   Temp: 98.6 °F (37 °C)        Physical Exam    Assessment:       1. Insect bite of lower leg, unspecified laterality, initial encounter        Plan:       Ricardo was seen today for leg swelling.    Diagnoses and all orders for this visit:    Insect bite of lower leg, unspecified laterality, initial encounter  -     hydrOXYzine HCL (ATARAX) 25 MG tablet; Take 1 tablet (25 mg total) by mouth every evening.  -     Tdap Vaccine         No follow-ups on file.    I have reviewed the positive depression score which warrants active treatment with psychotherapy and/or medications.  Patient is seeing psychologist and has appointment for psychiatrist.

## 2022-06-08 NOTE — PATIENT INSTRUCTIONS
For insect bites:  Immediately apply ice and take a Benadryl if available.  Apply hemorrhoid cream as soon as possible up to 3 to 4 times a day.    Restrict portions on carbohydrates, especially rice bread pasta and potatoes, to a fist-sized portion per meal.   [employer].

## 2022-09-24 ENCOUNTER — HOSPITAL ENCOUNTER (EMERGENCY)
Facility: HOSPITAL | Age: 22
Discharge: HOME | End: 2022-09-24
Attending: EMERGENCY MEDICINE
Payer: COMMERCIAL

## 2022-09-24 VITALS
TEMPERATURE: 99 F | HEART RATE: 77 BPM | HEIGHT: 66 IN | DIASTOLIC BLOOD PRESSURE: 63 MMHG | BODY MASS INDEX: 36.96 KG/M2 | RESPIRATION RATE: 16 BRPM | SYSTOLIC BLOOD PRESSURE: 117 MMHG | WEIGHT: 230 LBS | OXYGEN SATURATION: 98 %

## 2022-09-24 DIAGNOSIS — R07.9 CHEST PAIN: ICD-10-CM

## 2022-09-24 DIAGNOSIS — K80.20 GALLSTONES: Primary | ICD-10-CM

## 2022-09-24 LAB
ALBUMIN SERPL BCP-MCNC: 3.9 G/DL (ref 3.5–5.2)
ALP SERPL-CCNC: 66 U/L (ref 55–135)
ALT SERPL W/O P-5'-P-CCNC: 13 U/L (ref 10–44)
ANION GAP SERPL CALC-SCNC: 9 MMOL/L (ref 8–16)
AST SERPL-CCNC: 13 U/L (ref 10–40)
B-HCG UR QL: NEGATIVE
BASOPHILS # BLD AUTO: 0.08 K/UL (ref 0–0.2)
BASOPHILS NFR BLD: 1 % (ref 0–1.9)
BILIRUB SERPL-MCNC: 0.4 MG/DL (ref 0.1–1)
BUN SERPL-MCNC: 12 MG/DL (ref 6–20)
CALCIUM SERPL-MCNC: 9.4 MG/DL (ref 8.7–10.5)
CHLORIDE SERPL-SCNC: 110 MMOL/L (ref 95–110)
CO2 SERPL-SCNC: 21 MMOL/L (ref 23–29)
CREAT SERPL-MCNC: 0.7 MG/DL (ref 0.5–1.4)
D DIMER PPP IA.FEU-MCNC: 0.23 MG/L FEU
DIFFERENTIAL METHOD: ABNORMAL
EOSINOPHIL # BLD AUTO: 0.2 K/UL (ref 0–0.5)
EOSINOPHIL NFR BLD: 2 % (ref 0–8)
ERYTHROCYTE [DISTWIDTH] IN BLOOD BY AUTOMATED COUNT: 17.6 % (ref 11.5–14.5)
EST. GFR  (NO RACE VARIABLE): >60 ML/MIN/1.73 M^2
GLUCOSE SERPL-MCNC: 90 MG/DL (ref 70–110)
HCT VFR BLD AUTO: 28.9 % (ref 37–48.5)
HGB BLD-MCNC: 7.8 G/DL (ref 12–16)
IMM GRANULOCYTES # BLD AUTO: 0.02 K/UL (ref 0–0.04)
IMM GRANULOCYTES NFR BLD AUTO: 0.3 % (ref 0–0.5)
LIPASE SERPL-CCNC: 63 U/L (ref 4–60)
LYMPHOCYTES # BLD AUTO: 2.9 K/UL (ref 1–4.8)
LYMPHOCYTES NFR BLD: 35.8 % (ref 18–48)
MCH RBC QN AUTO: 17.8 PG (ref 27–31)
MCHC RBC AUTO-ENTMCNC: 27 G/DL (ref 32–36)
MCV RBC AUTO: 66 FL (ref 82–98)
MONOCYTES # BLD AUTO: 0.6 K/UL (ref 0.3–1)
MONOCYTES NFR BLD: 6.9 % (ref 4–15)
NEUTROPHILS # BLD AUTO: 4.3 K/UL (ref 1.8–7.7)
NEUTROPHILS NFR BLD: 54 % (ref 38–73)
NRBC BLD-RTO: 0 /100 WBC
PLATELET # BLD AUTO: 343 K/UL (ref 150–450)
PMV BLD AUTO: 9.5 FL (ref 9.2–12.9)
POTASSIUM SERPL-SCNC: 3.9 MMOL/L (ref 3.5–5.1)
PROT SERPL-MCNC: 6.9 G/DL (ref 6–8.4)
RBC # BLD AUTO: 4.38 M/UL (ref 4–5.4)
SODIUM SERPL-SCNC: 140 MMOL/L (ref 136–145)
TROPONIN I SERPL DL<=0.01 NG/ML-MCNC: <0.006 NG/ML (ref 0–0.03)
WBC # BLD AUTO: 8 K/UL (ref 3.9–12.7)

## 2022-09-24 PROCEDURE — 80053 COMPREHEN METABOLIC PANEL: CPT | Performed by: EMERGENCY MEDICINE

## 2022-09-24 PROCEDURE — 84484 ASSAY OF TROPONIN QUANT: CPT | Performed by: EMERGENCY MEDICINE

## 2022-09-24 PROCEDURE — 36415 COLL VENOUS BLD VENIPUNCTURE: CPT | Performed by: EMERGENCY MEDICINE

## 2022-09-24 PROCEDURE — 83690 ASSAY OF LIPASE: CPT | Performed by: EMERGENCY MEDICINE

## 2022-09-24 PROCEDURE — 85379 FIBRIN DEGRADATION QUANT: CPT | Performed by: EMERGENCY MEDICINE

## 2022-09-24 PROCEDURE — 93010 EKG 12-LEAD: ICD-10-PCS | Mod: ,,, | Performed by: INTERNAL MEDICINE

## 2022-09-24 PROCEDURE — 93010 ELECTROCARDIOGRAM REPORT: CPT | Mod: ,,, | Performed by: INTERNAL MEDICINE

## 2022-09-24 PROCEDURE — 81025 URINE PREGNANCY TEST: CPT | Performed by: EMERGENCY MEDICINE

## 2022-09-24 PROCEDURE — 85025 COMPLETE CBC W/AUTO DIFF WBC: CPT | Performed by: EMERGENCY MEDICINE

## 2022-09-24 PROCEDURE — 99285 EMERGENCY DEPT VISIT HI MDM: CPT | Mod: 25

## 2022-09-24 PROCEDURE — 93005 ELECTROCARDIOGRAM TRACING: CPT

## 2022-09-24 RX ORDER — ONDANSETRON 4 MG/1
4 TABLET, ORALLY DISINTEGRATING ORAL EVERY 6 HOURS PRN
Qty: 30 TABLET | Refills: 0 | Status: SHIPPED | OUTPATIENT
Start: 2022-09-24 | End: 2022-12-14

## 2022-09-24 NOTE — ED PROVIDER NOTES
"Encounter Date: 2022    SCRIBE #1 NOTE: I, Maryitzel Quiñones, am scribing for, and in the presence of,  Rakesh Rothman MD.     History     Chief Complaint   Patient presents with    Abdominal Pain     Upper abd pain that radiates into back, off and on x months , pain started again today at 4 am     Time seen by provider: 7:03 AM on 2022    Ricardo Rivera is a 21 y.o. female who presents to the ED with an onset of upper abdominal pain and back pain that began around 4 AM this morning. Patient states she has experienced this before once or twice but has never been seen by a doctor for it. She says it feels like she has "to poop." Patient had a pulmonary embolism on 10/25/2021 and followed up with her primary in November. Patient has not been back recently, "because it is difficult to find a ." Her symptoms today are not similar to when she presented with the pulmonary embolism. Patient is not pregnant or breast feeding and is not on blood thinners. The patient denies lower abdominal pain, SOB, nausea or any other symptoms at this time. Patient PMHx of iron deficiency anemia and a PSHx of .       The history is provided by the patient.   Review of patient's allergies indicates:  No Known Allergies  Past Medical History:   Diagnosis Date    Iron deficiency anemia 2018     Past Surgical History:   Procedure Laterality Date     SECTION N/A 2021    Procedure:  SECTION;  Surgeon: Rafael Rodriguez MD;  [facility];  Service: OB/GYN;  Laterality: N/A;     Family History   Problem Relation Age of Onset    No Known Problems Mother     No Known Problems Father     Breast cancer Neg Hx     Ovarian cancer Neg Hx      Social History     Tobacco Use    Smoking status: Never    Smokeless tobacco: Never   Substance Use Topics    Alcohol use: No    Drug use: No     Review of Systems   Constitutional:  Negative for fever.   HENT:  Negative for sore throat.  "   Respiratory:  Negative for shortness of breath.    Cardiovascular:  Negative for chest pain.   Gastrointestinal:  Positive for abdominal pain (upper). Negative for nausea.   Genitourinary:  Negative for dysuria.   Musculoskeletal:  Positive for back pain.   Skin:  Negative for rash.   Neurological:  Negative for weakness.   Hematological:  Does not bruise/bleed easily.   All other systems reviewed and are negative.    Physical Exam     Initial Vitals [09/24/22 0653]   BP Pulse Resp Temp SpO2   (!) 108/58 69 18 99 °F (37.2 °C) 100 %      MAP       --         Physical Exam    Nursing note and vitals reviewed.  Constitutional: She appears well-developed and well-nourished. She is not diaphoretic. No distress.   HENT:   Head: Normocephalic and atraumatic.   Eyes: EOM are normal.   Neck: Neck supple.   Normal range of motion.  Cardiovascular:  Normal rate, regular rhythm and normal heart sounds.     Exam reveals no gallop and no friction rub.       No murmur heard.  Pulmonary/Chest: Breath sounds normal. No respiratory distress. She has no wheezes. She has no rhonchi. She has no rales.   Abdominal: There is abdominal tenderness in the right upper quadrant.   Musculoskeletal:         General: Normal range of motion.      Cervical back: Normal range of motion and neck supple.     Neurological: She is alert and oriented to person, place, and time.   Skin: Skin is warm and dry.   Psychiatric: She has a normal mood and affect. Her behavior is normal. Judgment and thought content normal.       ED Course   Procedures  Labs Reviewed   CBC W/ AUTO DIFFERENTIAL - Abnormal; Notable for the following components:       Result Value    Hemoglobin 7.8 (*)     Hematocrit 28.9 (*)     MCV 66 (*)     MCH 17.8 (*)     MCHC 27.0 (*)     RDW 17.6 (*)     All other components within normal limits   COMPREHENSIVE METABOLIC PANEL - Abnormal; Notable for the following components:    CO2 21 (*)     All other components within normal limits    LIPASE - Abnormal; Notable for the following components:    Lipase 63 (*)     All other components within normal limits   TROPONIN I   D DIMER, QUANTITATIVE   PREGNANCY TEST, URINE RAPID    Narrative:     Specimen Source->Urine          Imaging Results              X-Ray Chest PA And Lateral (Final result)  Result time 09/24/22 08:21:40      Final result by Nilson Fournier Jr., MD (09/24/22 08:21:40)                   Impression:      No acute abnormality.      Electronically signed by: Nilson Fournier MD  Date:    09/24/2022  Time:    08:21               Narrative:    EXAMINATION:  XR CHEST PA AND LATERAL    CLINICAL HISTORY:  Chest Pain;    TECHNIQUE:  PA and lateral views of the chest were performed.    COMPARISON:  Chest of October 25, 2021    FINDINGS:  The lungs are clear, with normal appearance of pulmonary vasculature and no pleural effusion or pneumothorax.    The cardiac silhouette is normal in size. The hilar and mediastinal contours are unremarkable.    Bones are intact.                                       US Abdomen Limited (Final result)  Result time 09/24/22 08:20:19      Final result by Nilson Fournier Jr., MD (09/24/22 08:20:19)                   Impression:      Cholelithiasis      Electronically signed by: Nilson Fournier MD  Date:    09/24/2022  Time:    08:20               Narrative:    EXAMINATION:  US ABDOMEN LIMITED    CLINICAL HISTORY:  ruq pain;    TECHNIQUE:  Limited ultrasound of the right upper quadrant of the abdomen (including pancreas, liver, gallbladder, common bile duct, and spleen) was performed.    COMPARISON:  None.    FINDINGS:  Liver: Normal in size, measuring 13 cm. Homogeneous echotexture. No focal hepatic lesions.    Gallbladder: Multiple gallstones are identified.  The gallbladder wall is not significantly thickened measuring 2.2 mm    Biliary system: The common duct is not dilated, measuring 3.9 mm.  No intrahepatic ductal dilatation.    The right kidney  measures 11.5 cm without mass or hydronephrosis    Miscellaneous: No upper abdominal ascites.                                       Medications - No data to display  Medical Decision Making:   History:   Old Medical Records: I decided to obtain old medical records.  Independently Interpreted Test(s):   I have ordered and independently interpreted X-rays - see prior notes.  I have ordered and independently interpreted EKG Reading(s) - see prior notes  Clinical Tests:   Lab Tests: Ordered and Reviewed  Radiological Study: Ordered and Reviewed  Medical Tests: Ordered and Reviewed        Scribe Attestation:   Scribe #1: I performed the above scribed service and the documentation accurately describes the services I performed. I attest to the accuracy of the note.        ED Course as of 09/24/22 1535   Sat Sep 24, 2022   0700 BP(!): 108/58 [EF]   0700 Temp: 99 °F (37.2 °C) [EF]   0700 Temp src: Oral [EF]   0700 Pulse: 69 [EF]   0700 Resp: 18 [EF]   0700 SpO2: 100 % [EF]   0754 Hemoglobin(!): 7.8 [EF]   0758 Sinus rhythm 79 beats per minute normal axis no ST elevation or depression or T-wave inversion independently interpreted [EF]   0801 D-Dimer: 0.23 [EF]   0825 X-Ray Chest PA And Lateral [EF]   0825 US Abdomen Limited [EF]   0905 Preg Test, Ur: Negative [EF]   0923 21-year-old female presents to the emergency room with vague diffuse upper abdominal pain with some radiation to the back.  Some nausea earlier but symptoms are currently resolved.  Ultrasound demonstrates gallstones but no acute cholecystitis.  Patient can be discharged home.  Referred to General surgery.  Return to the ER for worsening pain. [EF]      ED Course User Index  [EF] Rakesh Rothman MD               I, Dr. Rothman, personally performed the services described in this documentation. All medical record entries made by the scribe were at my direction and in my presence.  I have reviewed the chart and agree that the record reflects my personal  performance and is accurate and complete.2:49 PM 09/24/2022    Clinical Impression:   Final diagnoses:  [R07.9] Chest pain  [K80.20] Gallstones (Primary)      ED Disposition Condition    Discharge Stable          ED Prescriptions       Medication Sig Dispense Start Date End Date Auth. Provider    ondansetron (ZOFRAN-ODT) 4 MG TbDL Take 1 tablet (4 mg total) by mouth every 6 (six) hours as needed (Nausea or vomiting). 30 tablet 9/24/2022 -- Rakesh Rothman MD          Follow-up Information       Follow up With Specialties Details Why Contact Info    [facility] Emergency Dept Emergency Medicine  As needed, If symptoms worsen [address] [place] [id number] [phone]    Trav Thomson MD General Surgery, Surgery Schedule an appointment as soon as possible for a visit   [address] [facility]  [address]  [facility] [address]  [phone]               Rakesh Rothman MD  09/24/22 5060

## 2022-10-03 ENCOUNTER — OFFICE VISIT (OUTPATIENT)
Dept: SURGERY | Facility: CLINIC | Age: 22
End: 2022-10-03
Payer: COMMERCIAL

## 2022-10-03 VITALS
HEIGHT: 66 IN | TEMPERATURE: 98 F | WEIGHT: 235.44 LBS | SYSTOLIC BLOOD PRESSURE: 110 MMHG | HEART RATE: 77 BPM | DIASTOLIC BLOOD PRESSURE: 61 MMHG | BODY MASS INDEX: 37.84 KG/M2

## 2022-10-03 DIAGNOSIS — K80.20 CALCULUS OF GALLBLADDER WITHOUT CHOLECYSTITIS WITHOUT OBSTRUCTION: Primary | ICD-10-CM

## 2022-10-03 PROCEDURE — 99999 PR PBB SHADOW E&M-EST. PATIENT-LVL III: ICD-10-PCS | Mod: PBBFAC,,, | Performed by: SURGERY

## 2022-10-03 PROCEDURE — 3074F PR MOST RECENT SYSTOLIC BLOOD PRESSURE < 130 MM HG: ICD-10-PCS | Mod: CPTII,S$GLB,, | Performed by: SURGERY

## 2022-10-03 PROCEDURE — 3008F PR BODY MASS INDEX (BMI) DOCUMENTED: ICD-10-PCS | Mod: CPTII,S$GLB,, | Performed by: SURGERY

## 2022-10-03 PROCEDURE — 3078F DIAST BP <80 MM HG: CPT | Mod: CPTII,S$GLB,, | Performed by: SURGERY

## 2022-10-03 PROCEDURE — 3078F PR MOST RECENT DIASTOLIC BLOOD PRESSURE < 80 MM HG: ICD-10-PCS | Mod: CPTII,S$GLB,, | Performed by: SURGERY

## 2022-10-03 PROCEDURE — 1160F RVW MEDS BY RX/DR IN RCRD: CPT | Mod: CPTII,S$GLB,, | Performed by: SURGERY

## 2022-10-03 PROCEDURE — 3074F SYST BP LT 130 MM HG: CPT | Mod: CPTII,S$GLB,, | Performed by: SURGERY

## 2022-10-03 PROCEDURE — 3008F BODY MASS INDEX DOCD: CPT | Mod: CPTII,S$GLB,, | Performed by: SURGERY

## 2022-10-03 PROCEDURE — 99203 PR OFFICE/OUTPT VISIT, NEW, LEVL III, 30-44 MIN: ICD-10-PCS | Mod: S$GLB,,, | Performed by: SURGERY

## 2022-10-03 PROCEDURE — 1159F PR MEDICATION LIST DOCUMENTED IN MEDICAL RECORD: ICD-10-PCS | Mod: CPTII,S$GLB,, | Performed by: SURGERY

## 2022-10-03 PROCEDURE — 99203 OFFICE O/P NEW LOW 30 MIN: CPT | Mod: S$GLB,,, | Performed by: SURGERY

## 2022-10-03 PROCEDURE — 1159F MED LIST DOCD IN RCRD: CPT | Mod: CPTII,S$GLB,, | Performed by: SURGERY

## 2022-10-03 PROCEDURE — 99999 PR PBB SHADOW E&M-EST. PATIENT-LVL III: CPT | Mod: PBBFAC,,, | Performed by: SURGERY

## 2022-10-03 PROCEDURE — 1160F PR REVIEW ALL MEDS BY PRESCRIBER/CLIN PHARMACIST DOCUMENTED: ICD-10-PCS | Mod: CPTII,S$GLB,, | Performed by: SURGERY

## 2022-10-03 RX ORDER — ESCITALOPRAM OXALATE 5 MG/1
5 TABLET ORAL DAILY
COMMUNITY
Start: 2022-09-21 | End: 2022-12-14

## 2022-10-03 RX ORDER — TRAZODONE HYDROCHLORIDE 50 MG/1
25-50 TABLET ORAL NIGHTLY PRN
COMMUNITY
Start: 2022-07-06 | End: 2022-12-14

## 2022-10-03 RX ORDER — ESCITALOPRAM OXALATE 10 MG/1
10 TABLET ORAL EVERY MORNING
COMMUNITY
Start: 2022-07-28 | End: 2023-01-31 | Stop reason: CLARIF

## 2022-10-03 NOTE — PROGRESS NOTES
Subjective:       Patient ID: Ricardo Rivera is a 21 y.o. female.    Chief Complaint: Consult (F/U E.R./Gallstones)      HPI 21-year-old female referred from the emergency department for symptomatic cholelithiasis.  She was seen last week and noted to have uncomplicated cholelithiasis.  She did have some nausea but no vomiting.  She denies any fever or chills.  She is had similar symptoms to this in the past.  She describes epigastric and right upper quadrant pain which radiates into her back.  Did not appear to be associated with any particular food.  She is noted to be anemic at this time.  Her hemoglobin is 7.8.  She does have a history of iron-deficiency anemia.  She was in the hospital back in 2021 with a pulmonary embolus.  Her hemoglobin was in the 6 range then and she received 2 units of red cells.  She was discharged on Eliquis but really did not follow-up.  It is unclear how long she was on the Eliquis.  Her symptoms right now are stable.  She is not having any    Pain.  She wants to proceed with cholecystectomy.  I think she needs to at least be seen by primary care again for clearance and to evaluate for ongoing treatment for her anemia.  She denies any blood in her stools.  She denies abnormally heavy menses.  He does have a history of iron infusions in the past as well.    Past Medical History:   Diagnosis Date    Iron deficiency anemia 2018     Past Surgical History:   Procedure Laterality Date     SECTION N/A 2021    Procedure:  SECTION;  Surgeon: Rafael Rodriguez MD;  Location: [facility]&;  Service: OB/GYN;  Laterality: N/A;         Current Outpatient Medications:     EScitalopram oxalate (LEXAPRO) 10 MG tablet, Take 10 mg by mouth every morning., Disp: , Rfl:     EScitalopram oxalate (LEXAPRO) 5 MG Tab, Take 5 mg by mouth once daily., Disp: , Rfl:     multivit-min/iron/folic acid/K (ADULTS MULTIVITAMIN ORAL), TAKE 1 TABLET BY MOUTH ONCE DAILY., Disp:  , Rfl:     ondansetron (ZOFRAN-ODT) 4 MG TbDL, Take 1 tablet (4 mg total) by mouth every 6 (six) hours as needed (Nausea or vomiting). (Patient not taking: Reported on 10/3/2022), Disp: 30 tablet, Rfl: 0    traZODone (DESYREL) 50 MG tablet, Take 25-50 mg by mouth nightly as needed., Disp: , Rfl:     Review of patient's allergies indicates:  No Known Allergies    Family History   Problem Relation Age of Onset    No Known Problems Mother     No Known Problems Father     Breast cancer Neg Hx     Ovarian cancer Neg Hx      Social History     Socioeconomic History    Marital status: Single   Tobacco Use    Smoking status: Never    Smokeless tobacco: Never   Substance and Sexual Activity    Alcohol use: No    Drug use: No    Sexual activity: Yes     Partners: Male       Review of Systems   Constitutional:  Negative for activity change, chills, fever and unexpected weight change.   HENT:  Negative for congestion, sore throat, trouble swallowing and voice change.    Eyes:  Negative for redness and visual disturbance.   Respiratory:  Negative for cough, shortness of breath and wheezing.    Cardiovascular:  Negative for chest pain and palpitations.   Gastrointestinal:  Positive for abdominal pain and nausea. Negative for blood in stool and vomiting.   Endocrine: Negative.    Genitourinary:  Negative for dysuria, frequency and hematuria.   Musculoskeletal:  Negative for arthralgias, back pain and neck pain.   Skin:  Negative for rash and wound.   Allergic/Immunologic: Negative.    Neurological:  Negative for dizziness, weakness and headaches.   Hematological:  Negative for adenopathy.   Psychiatric/Behavioral:  Negative for agitation and dysphoric mood. The patient is not nervous/anxious.    Objective:     Physical Exam  Constitutional:       General: She is not in acute distress.     Appearance: She is well-developed.   HENT:      Head: Normocephalic and atraumatic.   Eyes:      General: No scleral icterus.      Conjunctiva/sclera: Conjunctivae normal.      Pupils: Pupils are equal, round, and reactive to light.   Neck:      Thyroid: No thyromegaly.   Cardiovascular:      Rate and Rhythm: Normal rate and regular rhythm.      Heart sounds: No murmur heard.  Pulmonary:      Effort: Pulmonary effort is normal.      Breath sounds: Normal breath sounds. No wheezing or rales.   Abdominal:      General: Bowel sounds are normal. There is no distension.      Palpations: Abdomen is soft. There is no mass.      Tenderness: There is no abdominal tenderness.      Hernia: No hernia is present.   Musculoskeletal:         General: Normal range of motion.      Cervical back: Normal range of motion.   Lymphadenopathy:      Cervical: No cervical adenopathy.   Skin:     General: Skin is warm and dry.      Findings: No erythema or rash.   Neurological:      Mental Status: She is alert and oriented to person, place, and time.   Psychiatric:         Behavior: Behavior normal.     Assessment:     Encounter Diagnosis   Name Primary?    Calculus of gallbladder without cholecystitis without obstruction Yes       Plan:      Patient will need laparoscopic cholecystectomy.  She will follow up with primary care for evaluation and plan for workup and management of her chronic anemia.  Patient will call to schedule a date for cholecystectomy following that evaluation.

## 2022-10-06 ENCOUNTER — OFFICE VISIT (OUTPATIENT)
Dept: FAMILY MEDICINE | Facility: CLINIC | Age: 22
End: 2022-10-06
Payer: COMMERCIAL

## 2022-10-06 VITALS
OXYGEN SATURATION: 98 % | BODY MASS INDEX: 38.46 KG/M2 | TEMPERATURE: 99 F | HEART RATE: 91 BPM | HEIGHT: 66 IN | WEIGHT: 239.31 LBS | DIASTOLIC BLOOD PRESSURE: 78 MMHG | SYSTOLIC BLOOD PRESSURE: 124 MMHG | RESPIRATION RATE: 16 BRPM

## 2022-10-06 DIAGNOSIS — K80.20 CALCULUS OF GALLBLADDER WITHOUT CHOLECYSTITIS WITHOUT OBSTRUCTION: ICD-10-CM

## 2022-10-06 DIAGNOSIS — D50.0 IRON DEFICIENCY ANEMIA DUE TO CHRONIC BLOOD LOSS: Primary | ICD-10-CM

## 2022-10-06 DIAGNOSIS — N92.4 MENORRHAGIA, PREMENOPAUSAL: ICD-10-CM

## 2022-10-06 LAB
B-HCG UR QL: NEGATIVE
CTP QC/QA: YES

## 2022-10-06 PROCEDURE — 3074F SYST BP LT 130 MM HG: CPT | Mod: CPTII,S$GLB,, | Performed by: FAMILY MEDICINE

## 2022-10-06 PROCEDURE — 81025 POCT URINE PREGNANCY: ICD-10-PCS | Mod: S$GLB,,, | Performed by: FAMILY MEDICINE

## 2022-10-06 PROCEDURE — 3078F DIAST BP <80 MM HG: CPT | Mod: CPTII,S$GLB,, | Performed by: FAMILY MEDICINE

## 2022-10-06 PROCEDURE — 3078F PR MOST RECENT DIASTOLIC BLOOD PRESSURE < 80 MM HG: ICD-10-PCS | Mod: CPTII,S$GLB,, | Performed by: FAMILY MEDICINE

## 2022-10-06 PROCEDURE — 3074F PR MOST RECENT SYSTOLIC BLOOD PRESSURE < 130 MM HG: ICD-10-PCS | Mod: CPTII,S$GLB,, | Performed by: FAMILY MEDICINE

## 2022-10-06 PROCEDURE — 99214 PR OFFICE/OUTPT VISIT, EST, LEVL IV, 30-39 MIN: ICD-10-PCS | Mod: 25,S$GLB,, | Performed by: FAMILY MEDICINE

## 2022-10-06 PROCEDURE — 3008F PR BODY MASS INDEX (BMI) DOCUMENTED: ICD-10-PCS | Mod: CPTII,S$GLB,, | Performed by: FAMILY MEDICINE

## 2022-10-06 PROCEDURE — 1159F MED LIST DOCD IN RCRD: CPT | Mod: CPTII,S$GLB,, | Performed by: FAMILY MEDICINE

## 2022-10-06 PROCEDURE — 1159F PR MEDICATION LIST DOCUMENTED IN MEDICAL RECORD: ICD-10-PCS | Mod: CPTII,S$GLB,, | Performed by: FAMILY MEDICINE

## 2022-10-06 PROCEDURE — 81025 URINE PREGNANCY TEST: CPT | Mod: S$GLB,,, | Performed by: FAMILY MEDICINE

## 2022-10-06 PROCEDURE — 3008F BODY MASS INDEX DOCD: CPT | Mod: CPTII,S$GLB,, | Performed by: FAMILY MEDICINE

## 2022-10-06 PROCEDURE — 99214 OFFICE O/P EST MOD 30 MIN: CPT | Mod: 25,S$GLB,, | Performed by: FAMILY MEDICINE

## 2022-10-06 RX ORDER — QUINIDINE GLUCONATE 324 MG
480 TABLET, EXTENDED RELEASE ORAL 2 TIMES DAILY
Qty: 60 TABLET | Refills: 5 | COMMUNITY
Start: 2022-10-06 | End: 2023-04-04

## 2022-10-06 RX ORDER — SWAB
1 SWAB, NON-MEDICATED MISCELLANEOUS DAILY
Qty: 30 TABLET | Refills: 11 | Status: SHIPPED | OUTPATIENT
Start: 2022-10-06 | End: 2022-12-14

## 2022-10-06 NOTE — PROGRESS NOTES
Subjective:       Patient ID: Ricardo Rivera is a 21 y.o. female.    Chief Complaint: Cholelithiasis (Pain for about 2 weeks)      Patient is here for a medical evaluation she was diagnosed with gallstones after acute cholecystitis was seen in the ER and was discharged to follow-up with Dr. Abrams to be evaluated for cholecystectomy they have concerns about her low hemoglobin.  Lab Results       Component                Value               Date                       WBC                      8.00                09/24/2022                 HGB                      7.8 (L)             09/24/2022                 HCT                      28.9 (L)            09/24/2022                 PLT                      343                 09/24/2022                 CHOL                     170                 11/02/2021                 TRIG                     107                 11/02/2021                 HDL                      41 (L)              11/02/2021                 ALT                      13                  09/24/2022                 AST                      13                  09/24/2022                 NA                       140                 09/24/2022                 K                        3.9                 09/24/2022                 CL                       110                 09/24/2022                 CREATININE               0.7                 09/24/2022                 BUN                      12                  09/24/2022                 CO2                      21 (L)              09/24/2022                 TSH                      1.923               12/10/2020                 INR                      1.0                 10/26/2021                 GLUF                     89                  05/25/2021            Patient does report she has been having some heavy vaginal periods is approximately half box of pads or tampons.  Patient has struggle with iron deficiency anemia since 2017.        Allergies and Medications:   Review of patient's allergies indicates:  No Known Allergies  Current Outpatient Medications   Medication Sig Dispense Refill    EScitalopram oxalate (LEXAPRO) 10 MG tablet Take 10 mg by mouth every morning.      EScitalopram oxalate (LEXAPRO) 5 MG Tab Take 5 mg by mouth once daily.      multivit-min/iron/folic acid/K (ADULTS MULTIVITAMIN ORAL) TAKE 1 TABLET BY MOUTH ONCE DAILY.      ondansetron (ZOFRAN-ODT) 4 MG TbDL Take 1 tablet (4 mg total) by mouth every 6 (six) hours as needed (Nausea or vomiting). 30 tablet 0    traZODone (DESYREL) 50 MG tablet Take 25-50 mg by mouth nightly as needed.      prenatal vit-iron fum-folic ac 28 mg iron- 800 mcg Tab Take 1 tablet by mouth once daily. 30 tablet 11     No current facility-administered medications for this visit.       Family History:   Family History   Problem Relation Age of Onset    No Known Problems Mother     No Known Problems Father     Breast cancer Neg Hx     Ovarian cancer Neg Hx        Social History:   Social History     Socioeconomic History    Marital status: Single   Tobacco Use    Smoking status: Never    Smokeless tobacco: Never   Substance and Sexual Activity    Alcohol use: No    Drug use: No    Sexual activity: Yes     Partners: Male       Review of Systems    Objective:     Vitals:    10/06/22 1441   BP: 124/78   Pulse: 91   Resp: 16   Temp: 99.1 °F (37.3 °C)        Physical Exam  Vitals and nursing note reviewed.   Constitutional:       General: She is not in acute distress.     Appearance: Normal appearance. She is well-developed and normal weight. She is not ill-appearing, toxic-appearing or diaphoretic.   HENT:      Head: Normocephalic and atraumatic.   Eyes:      Pupils: Pupils are equal, round, and reactive to light.   Cardiovascular:      Rate and Rhythm: Normal rate and regular rhythm.      Heart sounds: Normal heart sounds. No murmur heard.    No friction rub. No gallop.   Pulmonary:      Effort:  Pulmonary effort is normal. No respiratory distress.      Breath sounds: Normal breath sounds. No stridor. No wheezing, rhonchi or rales.   Chest:      Chest wall: No tenderness.   Musculoskeletal:      Right lower leg: No edema.      Left lower leg: No edema.   Neurological:      Mental Status: She is alert.   Psychiatric:         Behavior: Behavior normal.         Thought Content: Thought content normal.         Judgment: Judgment normal.       Assessment:       1. Iron deficiency anemia due to chronic blood loss    2. Menorrhagia, premenopausal    3. Calculus of gallbladder without cholecystitis without obstruction        Plan:       Ricardo was seen today for cholelithiasis.    Diagnoses and all orders for this visit:    Iron deficiency anemia due to chronic blood loss  -     Iron and TIBC; Future  -     Ferritin; Future  -     Iron; Future  -     Reticulocytes; Future  -     prenatal vit-iron fum-folic ac 28 mg iron- 800 mcg Tab; Take 1 tablet by mouth once daily.  -     CBC auto differential; Future  -     POCT urine pregnancy    Menorrhagia, premenopausal    Calculus of gallbladder without cholecystitis without obstruction       No follow-ups on file.

## 2022-10-07 ENCOUNTER — PATIENT MESSAGE (OUTPATIENT)
Dept: FAMILY MEDICINE | Facility: CLINIC | Age: 22
End: 2022-10-07

## 2022-10-07 ENCOUNTER — TELEPHONE (OUTPATIENT)
Dept: FAMILY MEDICINE | Facility: CLINIC | Age: 22
End: 2022-10-07

## 2022-10-07 NOTE — TELEPHONE ENCOUNTER
----- Message from Alok Angeles MD sent at 10/6/2022  4:27 PM CDT -----  Results Ok, notify patient.

## 2022-10-10 ENCOUNTER — TELEPHONE (OUTPATIENT)
Dept: FAMILY MEDICINE | Facility: CLINIC | Age: 22
End: 2022-10-10

## 2022-10-10 ENCOUNTER — LAB VISIT (OUTPATIENT)
Dept: LAB | Facility: HOSPITAL | Age: 22
End: 2022-10-10
Attending: FAMILY MEDICINE
Payer: COMMERCIAL

## 2022-10-10 DIAGNOSIS — D50.0 IRON DEFICIENCY ANEMIA DUE TO CHRONIC BLOOD LOSS: ICD-10-CM

## 2022-10-10 LAB
ANISOCYTOSIS BLD QL SMEAR: SLIGHT
BASOPHILS # BLD AUTO: 0.08 K/UL (ref 0–0.2)
BASOPHILS NFR BLD: 0.9 % (ref 0–1.9)
DIFFERENTIAL METHOD: ABNORMAL
EOSINOPHIL # BLD AUTO: 0.2 K/UL (ref 0–0.5)
EOSINOPHIL NFR BLD: 1.8 % (ref 0–8)
ERYTHROCYTE [DISTWIDTH] IN BLOOD BY AUTOMATED COUNT: 19.4 % (ref 11.5–14.5)
FERRITIN SERPL-MCNC: 2 NG/ML (ref 20–300)
HCT VFR BLD AUTO: 31.4 % (ref 37–48.5)
HGB BLD-MCNC: 8.3 G/DL (ref 12–16)
IMM GRANULOCYTES # BLD AUTO: 0.03 K/UL (ref 0–0.04)
IMM GRANULOCYTES NFR BLD AUTO: 0.3 % (ref 0–0.5)
IRON SERPL-MCNC: 18 UG/DL (ref 30–160)
IRON SERPL-MCNC: 18 UG/DL (ref 30–160)
LYMPHOCYTES # BLD AUTO: 2.6 K/UL (ref 1–4.8)
LYMPHOCYTES NFR BLD: 28.4 % (ref 18–48)
MCH RBC QN AUTO: 17.6 PG (ref 27–31)
MCHC RBC AUTO-ENTMCNC: 26.4 G/DL (ref 32–36)
MCV RBC AUTO: 67 FL (ref 82–98)
MONOCYTES # BLD AUTO: 0.6 K/UL (ref 0.3–1)
MONOCYTES NFR BLD: 6.1 % (ref 4–15)
NEUTROPHILS # BLD AUTO: 5.6 K/UL (ref 1.8–7.7)
NEUTROPHILS NFR BLD: 62.5 % (ref 38–73)
NRBC BLD-RTO: 0 /100 WBC
PLATELET # BLD AUTO: 623 K/UL (ref 150–450)
PLATELET BLD QL SMEAR: ABNORMAL
PMV BLD AUTO: 10.3 FL (ref 9.2–12.9)
RBC # BLD AUTO: 4.72 M/UL (ref 4–5.4)
RETICS/RBC NFR AUTO: 1.7 % (ref 0.5–2.5)
SATURATED IRON: 4 % (ref 20–50)
TOTAL IRON BINDING CAPACITY: 514 UG/DL (ref 250–450)
TRANSFERRIN SERPL-MCNC: 367 MG/DL (ref 200–375)
WBC # BLD AUTO: 9.02 K/UL (ref 3.9–12.7)

## 2022-10-10 PROCEDURE — 82728 ASSAY OF FERRITIN: CPT | Performed by: FAMILY MEDICINE

## 2022-10-10 PROCEDURE — 84466 ASSAY OF TRANSFERRIN: CPT | Performed by: FAMILY MEDICINE

## 2022-10-10 PROCEDURE — 36415 COLL VENOUS BLD VENIPUNCTURE: CPT | Performed by: FAMILY MEDICINE

## 2022-10-10 PROCEDURE — 85025 COMPLETE CBC W/AUTO DIFF WBC: CPT | Performed by: FAMILY MEDICINE

## 2022-10-10 PROCEDURE — 85045 AUTOMATED RETICULOCYTE COUNT: CPT | Performed by: FAMILY MEDICINE

## 2022-10-10 NOTE — TELEPHONE ENCOUNTER
----- Message from Alok Angeles MD sent at 10/10/2022  2:14 PM CDT -----  Low serum iron and ferritin persists.  Continue iron replacement and will recheck in 3 months.

## 2022-10-10 NOTE — TELEPHONE ENCOUNTER
----- Message from Alok Angeles MD sent at 10/10/2022  3:35 PM CDT -----  Still quite anemic but improving.  Continue the iron replacement and recheck CBC in 1 month.

## 2022-10-11 ENCOUNTER — PATIENT MESSAGE (OUTPATIENT)
Dept: FAMILY MEDICINE | Facility: CLINIC | Age: 22
End: 2022-10-11

## 2022-10-19 ENCOUNTER — TELEPHONE (OUTPATIENT)
Dept: FAMILY MEDICINE | Facility: CLINIC | Age: 22
End: 2022-10-19

## 2022-10-19 RX ORDER — SODIUM CHLORIDE 9 MG/ML
10 INJECTION, SOLUTION INTRAMUSCULAR; INTRAVENOUS; SUBCUTANEOUS
Status: CANCELLED | OUTPATIENT
Start: 2022-10-19

## 2022-10-19 RX ORDER — ACETAMINOPHEN 325 MG/1
1000 TABLET ORAL
Status: CANCELLED | OUTPATIENT
Start: 2022-10-19

## 2022-10-19 RX ORDER — HEPARIN 100 UNIT/ML
500 SYRINGE INTRAVENOUS
Status: CANCELLED | OUTPATIENT
Start: 2022-10-19

## 2022-10-19 NOTE — TELEPHONE ENCOUNTER
----- Message from Pete Reeder LPN sent at 10/18/2022  1:49 PM CDT -----  Regarding: FW: Iron Infusion    ----- Message -----  From: Sugar Mcneil  Sent: 10/14/2022   8:40 AM CDT  To: Bridgette Dickinson Staff  Subject: FW: Iron Infusion                                  ----- Message -----  From: Sugar Mcneil  Sent: 10/12/2022   3:32 PM CDT  To: Cady Everett  Subject: Iron Infusion                                    Does Dr. Angeles want this pt to have an Iron Infusion- Infed?  Or does he want pt to take OTC iron?  If he wants pt to have an Infed, please have him place another order for Infed 50mg.  That is the only one that insurance will cover.  He has put in for Infed 25mg and 75mg.  Please let me know.    Thanks  Sugar  Pre-Service

## 2022-10-19 NOTE — TELEPHONE ENCOUNTER
I definitely want Iron dextran. patient has not responded to oral iron replacement.  Replace the order as iron dextran 50 mg but the defaults of 75 in 100.

## 2022-10-28 ENCOUNTER — OFFICE VISIT (OUTPATIENT)
Dept: URGENT CARE | Facility: CLINIC | Age: 22
End: 2022-10-28
Payer: COMMERCIAL

## 2022-10-28 ENCOUNTER — TELEPHONE (OUTPATIENT)
Dept: FAMILY MEDICINE | Facility: CLINIC | Age: 22
End: 2022-10-28

## 2022-10-28 VITALS
DIASTOLIC BLOOD PRESSURE: 70 MMHG | BODY MASS INDEX: 37.61 KG/M2 | WEIGHT: 234 LBS | RESPIRATION RATE: 16 BRPM | HEIGHT: 66 IN | SYSTOLIC BLOOD PRESSURE: 110 MMHG | TEMPERATURE: 103 F | OXYGEN SATURATION: 100 % | HEART RATE: 117 BPM

## 2022-10-28 DIAGNOSIS — Z20.828 EXPOSURE TO THE FLU: ICD-10-CM

## 2022-10-28 DIAGNOSIS — J10.1 INFLUENZA A VIRUS PRESENT: ICD-10-CM

## 2022-10-28 DIAGNOSIS — R05.9 COUGH, UNSPECIFIED TYPE: Primary | ICD-10-CM

## 2022-10-28 DIAGNOSIS — R50.9 FEVER, UNSPECIFIED FEVER CAUSE: ICD-10-CM

## 2022-10-28 LAB
CTP QC/QA: YES
FLUAV AG NPH QL: POSITIVE
FLUBV AG NPH QL: NEGATIVE

## 2022-10-28 PROCEDURE — 1160F RVW MEDS BY RX/DR IN RCRD: CPT | Mod: CPTII,S$GLB,, | Performed by: NURSE PRACTITIONER

## 2022-10-28 PROCEDURE — 99213 PR OFFICE/OUTPT VISIT, EST, LEVL III, 20-29 MIN: ICD-10-PCS | Mod: S$GLB,,, | Performed by: NURSE PRACTITIONER

## 2022-10-28 PROCEDURE — 87804 INFLUENZA ASSAY W/OPTIC: CPT | Mod: QW,,, | Performed by: NURSE PRACTITIONER

## 2022-10-28 PROCEDURE — 1159F MED LIST DOCD IN RCRD: CPT | Mod: CPTII,S$GLB,, | Performed by: NURSE PRACTITIONER

## 2022-10-28 PROCEDURE — 3074F SYST BP LT 130 MM HG: CPT | Mod: CPTII,S$GLB,, | Performed by: NURSE PRACTITIONER

## 2022-10-28 PROCEDURE — 3078F DIAST BP <80 MM HG: CPT | Mod: CPTII,S$GLB,, | Performed by: NURSE PRACTITIONER

## 2022-10-28 PROCEDURE — 1160F PR REVIEW ALL MEDS BY PRESCRIBER/CLIN PHARMACIST DOCUMENTED: ICD-10-PCS | Mod: CPTII,S$GLB,, | Performed by: NURSE PRACTITIONER

## 2022-10-28 PROCEDURE — 87804 POCT INFLUENZA A/B: ICD-10-PCS | Mod: QW,,, | Performed by: NURSE PRACTITIONER

## 2022-10-28 PROCEDURE — 99213 OFFICE O/P EST LOW 20 MIN: CPT | Mod: S$GLB,,, | Performed by: NURSE PRACTITIONER

## 2022-10-28 PROCEDURE — 3074F PR MOST RECENT SYSTOLIC BLOOD PRESSURE < 130 MM HG: ICD-10-PCS | Mod: CPTII,S$GLB,, | Performed by: NURSE PRACTITIONER

## 2022-10-28 PROCEDURE — 1159F PR MEDICATION LIST DOCUMENTED IN MEDICAL RECORD: ICD-10-PCS | Mod: CPTII,S$GLB,, | Performed by: NURSE PRACTITIONER

## 2022-10-28 PROCEDURE — 3078F PR MOST RECENT DIASTOLIC BLOOD PRESSURE < 80 MM HG: ICD-10-PCS | Mod: CPTII,S$GLB,, | Performed by: NURSE PRACTITIONER

## 2022-10-28 RX ORDER — CETIRIZINE HYDROCHLORIDE 10 MG/1
10 TABLET ORAL DAILY
Qty: 30 TABLET | Refills: 0 | Status: SHIPPED | OUTPATIENT
Start: 2022-10-28 | End: 2022-12-14

## 2022-10-28 RX ORDER — OSELTAMIVIR PHOSPHATE 75 MG/1
75 CAPSULE ORAL 2 TIMES DAILY
Qty: 10 CAPSULE | Refills: 0 | Status: SHIPPED | OUTPATIENT
Start: 2022-10-28 | End: 2022-11-02

## 2022-10-28 RX ORDER — FLUTICASONE PROPIONATE 50 MCG
1 SPRAY, SUSPENSION (ML) NASAL DAILY
Qty: 15.8 ML | Refills: 0 | Status: SHIPPED | OUTPATIENT
Start: 2022-10-28 | End: 2022-12-14

## 2022-10-28 RX ORDER — PROMETHAZINE HYDROCHLORIDE AND DEXTROMETHORPHAN HYDROBROMIDE 6.25; 15 MG/5ML; MG/5ML
5 SYRUP ORAL EVERY 4 HOURS PRN
Qty: 118 ML | Refills: 0 | Status: SHIPPED | OUTPATIENT
Start: 2022-10-28 | End: 2022-11-07

## 2022-10-28 RX ORDER — BENZONATATE 100 MG/1
100 CAPSULE ORAL 3 TIMES DAILY PRN
Qty: 30 CAPSULE | Refills: 0 | Status: SHIPPED | OUTPATIENT
Start: 2022-10-28 | End: 2022-11-07

## 2022-10-28 RX ORDER — IBUPROFEN 200 MG
800 TABLET ORAL
Status: COMPLETED | OUTPATIENT
Start: 2022-10-28 | End: 2022-10-28

## 2022-10-28 RX ADMIN — Medication 800 MG: at 04:10

## 2022-10-28 NOTE — TELEPHONE ENCOUNTER
----- Message from Sugar Mcneil sent at 10/12/2022  3:29 PM CDT -----  Regarding: Iron Infusion  Does Dr. Angeles want this pt to have an Iron Infusion- Infed?  Or does he want pt to take OTC iron?  If he wants pt to have an Infed, please have him place another order for Infed 50mg.  That is the only one that insurance will cover.  He has put in for Infed 25mg and 75mg.  Please let me know.    Thanks  Sugar  Pre-Service

## 2022-10-28 NOTE — PROGRESS NOTES
"Subjective:       Patient ID: Ricardo Rivera is a 22 y.o. female.    Vitals:  height is 5' 6" (1.676 m) and weight is 106.1 kg (234 lb). Her temperature is 103.4 °F (39.7 °C) (abnormal). Her blood pressure is 110/70 and her pulse is 117 (abnormal). Her respiration is 16 and oxygen saturation is 100%.     Chief Complaint: Cough    Flu exposure from her son.  States that she has been having a cough for over 1 week however new onset of symptoms with fever, chills, body aches, headache, sore throat today and her 15-month-old is positive for flu A at home    Cough  This is a new problem. The current episode started 1 to 4 weeks ago (10 days). The cough is Non-productive. Associated symptoms include chills, a fever, headaches, myalgias, a sore throat and shortness of breath (At times). Associated symptoms comments: Dizzy, "loses breath, heart beats twice". Treatments tried: nyquil, dayquil, mucinex dm,     Constitution: Positive for appetite change, chills, fatigue and fever.   HENT:  Positive for congestion and sore throat.    Cardiovascular:  Positive for palpitations (Infrequent).   Respiratory:  Positive for cough and shortness of breath (At times).    Musculoskeletal:  Positive for muscle ache.   Neurological:  Positive for dizziness and headaches.     Objective:      Physical Exam   Constitutional: She is oriented to person, place, and time. She appears well-developed.  Non-toxic appearance. She does not appear ill. No distress.   HENT:   Head: Normocephalic and atraumatic.   Ears:   Right Ear: Tympanic membrane, external ear and ear canal normal.   Left Ear: Tympanic membrane, external ear and ear canal normal.   Nose: Rhinorrhea and congestion present.   Mouth/Throat: Oropharynx is clear and moist. Mucous membranes are moist.   Eyes: Conjunctivae and EOM are normal.   Neck: Neck supple.   Cardiovascular: Normal rate, regular rhythm and normal heart sounds.   Pulmonary/Chest: Effort normal and breath " sounds normal. No respiratory distress. She has no wheezes. She has no rhonchi. She has no rales.   Abdominal: Normal appearance.   Musculoskeletal: Normal range of motion.         General: Normal range of motion.   Neurological: no focal deficit. She is alert and oriented to person, place, and time.   Skin: Skin is warm and dry. Capillary refill takes 2 to 3 seconds.   Psychiatric: Her behavior is normal. Mood normal.   Nursing note and vitals reviewed.      Assessment:       1. Cough, unspecified type    2. Fever, unspecified fever cause    3. Influenza A virus present    4. Exposure to the flu        Flu B negative  Plan:         Cough, unspecified type  -     POCT Influenza A/B    Fever, unspecified fever cause  -     ibuprofen tablet 800 mg    Influenza A virus present  -     oseltamivir (TAMIFLU) 75 MG capsule; Take 1 capsule (75 mg total) by mouth 2 (two) times daily. for 5 days  Dispense: 10 capsule; Refill: 0  -     baloxavir marboxiL (XOFLUZA) 40 mg tablet; Take 2 tablets (80 mg total) by mouth once. for 1 dose  Dispense: 2 tablet; Refill: 0  -     fluticasone propionate (FLONASE) 50 mcg/actuation nasal spray; 1 spray (50 mcg total) by Each Nostril route once daily.  Dispense: 15.8 mL; Refill: 0  -     cetirizine (ZYRTEC) 10 MG tablet; Take 1 tablet (10 mg total) by mouth once daily.  Dispense: 30 tablet; Refill: 0  -     benzonatate (TESSALON) 100 MG capsule; Take 1 capsule (100 mg total) by mouth 3 (three) times daily as needed for Cough.  Dispense: 30 capsule; Refill: 0  -     promethazine-dextromethorphan (PROMETHAZINE-DM) 6.25-15 mg/5 mL Syrp; Take 5 mLs by mouth every 4 (four) hours as needed (cough).  Dispense: 118 mL; Refill: 0    Exposure to the flu       Symptomatic treatment to include:    Rest, increase fluid intake to include 50 % water, 50% electrolyte replacement  Ibuprofen/Tylenol as directed for fever, sore throat, headache, body aches.  Tylenol helps with fever but ibuprofen or aleve  helps best for other symptoms.   Always take ibuprofen and or Aleve with food as repeated use can cause stomach irritation.  It is also advised to start taking Pepcid 20 mg over-the-counter twice a day for 7-10 days whenever taking NSAIDs for extended times for stomach protection  Zrytec 10 mg and flonase daily until prescriptions are completed for sinus symptoms and to reduce inflammation.   Tessalon perles cough pills as needed day or night.  Helps best for dry, throat irritation cough.  Phenergan cough syrup at night as directed if needed  Mucinex D over the counter as directed for sinus congestion.   Warm, salt water gargles, over the counter throat lozenges or sprays as desires.   Liquid benadryl and maalox 1 to 1 concentration, gargle and spit for temporary relief for sore throat.  ER for difficulty breathing not relieved by rest, excessive lethargy and/or change in mental status  Return to clinic for wheezing, shortness of breath, chest tightness, vomiting for re-evaluation and possible need for additional medications for the symptoms     May return to work/school once symptoms are improving and no fever for 24 hours    Printed prescriptions for both Tamiflu and xofluza are provided to you to find availability and best pricing

## 2022-10-28 NOTE — PATIENT INSTRUCTIONS
Symptomatic treatment to include:    Rest, increase fluid intake to include 50 % water, 50% electrolyte replacement  Ibuprofen/Tylenol as directed for fever, sore throat, headache, body aches.  Tylenol helps with fever but ibuprofen or aleve helps best for other symptoms.   Always take ibuprofen and or Aleve with food as repeated use can cause stomach irritation.  It is also advised to start taking Pepcid 20 mg over-the-counter twice a day for 7-10 days whenever taking NSAIDs for extended times for stomach protection  Zrytec 10 mg and flonase daily until prescriptions are completed for sinus symptoms and to reduce inflammation.   Tessalon perles cough pills as needed day or night.  Helps best for dry, throat irritation cough.  Phenergan cough syrup at night as directed if needed  Mucinex D over the counter as directed for sinus congestion.   Warm, salt water gargles, over the counter throat lozenges or sprays as desires.   Liquid benadryl and maalox 1 to 1 concentration, gargle and spit for temporary relief for sore throat.  ER for difficulty breathing not relieved by rest, excessive lethargy and/or change in mental status  Return to clinic for wheezing, shortness of breath, chest tightness, vomiting for re-evaluation and possible need for additional medications for the symptoms     May return to work/school once symptoms are improving and no fever for 24 hours    Printed prescriptions for both Tamiflu and xofluza are provided to you to find availability and best pricing

## 2022-11-10 ENCOUNTER — TELEPHONE (OUTPATIENT)
Dept: FAMILY MEDICINE | Facility: CLINIC | Age: 22
End: 2022-11-10

## 2022-11-10 NOTE — TELEPHONE ENCOUNTER
----- Message from Sugar Mcneil sent at 11/8/2022  1:13 PM CST -----  Regarding: Insurance Card info  Is there any way that someone can contact patient and get a copy of her insurance card, the front and back of card.  It is very important that I can get the numbers off the back of the card.  I am having trouble finding who to contact to get her medication approved.  Please let me know when this information is available so I can get her authorization started.    Thanks so much!  Sugar.

## 2022-11-14 ENCOUNTER — OFFICE VISIT (OUTPATIENT)
Dept: OBSTETRICS AND GYNECOLOGY | Facility: CLINIC | Age: 22
End: 2022-11-14
Payer: COMMERCIAL

## 2022-11-14 VITALS
DIASTOLIC BLOOD PRESSURE: 70 MMHG | WEIGHT: 235.69 LBS | HEIGHT: 66 IN | SYSTOLIC BLOOD PRESSURE: 124 MMHG | BODY MASS INDEX: 37.88 KG/M2

## 2022-11-14 DIAGNOSIS — Z01.419 WELL WOMAN EXAM: Primary | ICD-10-CM

## 2022-11-14 PROCEDURE — 99395 PR PREVENTIVE VISIT,EST,18-39: ICD-10-PCS | Mod: S$GLB,,, | Performed by: SPECIALIST

## 2022-11-14 PROCEDURE — 3078F PR MOST RECENT DIASTOLIC BLOOD PRESSURE < 80 MM HG: ICD-10-PCS | Mod: CPTII,S$GLB,, | Performed by: SPECIALIST

## 2022-11-14 PROCEDURE — 3074F SYST BP LT 130 MM HG: CPT | Mod: CPTII,S$GLB,, | Performed by: SPECIALIST

## 2022-11-14 PROCEDURE — 87624 HPV HI-RISK TYP POOLED RSLT: CPT | Performed by: SPECIALIST

## 2022-11-14 PROCEDURE — 3078F DIAST BP <80 MM HG: CPT | Mod: CPTII,S$GLB,, | Performed by: SPECIALIST

## 2022-11-14 PROCEDURE — 1159F PR MEDICATION LIST DOCUMENTED IN MEDICAL RECORD: ICD-10-PCS | Mod: CPTII,S$GLB,, | Performed by: SPECIALIST

## 2022-11-14 PROCEDURE — 99999 PR PBB SHADOW E&M-EST. PATIENT-LVL III: CPT | Mod: PBBFAC,,, | Performed by: SPECIALIST

## 2022-11-14 PROCEDURE — 99999 PR PBB SHADOW E&M-EST. PATIENT-LVL III: ICD-10-PCS | Mod: PBBFAC,,, | Performed by: SPECIALIST

## 2022-11-14 PROCEDURE — 1159F MED LIST DOCD IN RCRD: CPT | Mod: CPTII,S$GLB,, | Performed by: SPECIALIST

## 2022-11-14 PROCEDURE — 88175 CYTOPATH C/V AUTO FLUID REDO: CPT | Performed by: SPECIALIST

## 2022-11-14 PROCEDURE — 3008F PR BODY MASS INDEX (BMI) DOCUMENTED: ICD-10-PCS | Mod: CPTII,S$GLB,, | Performed by: SPECIALIST

## 2022-11-14 PROCEDURE — 99395 PREV VISIT EST AGE 18-39: CPT | Mod: S$GLB,,, | Performed by: SPECIALIST

## 2022-11-14 PROCEDURE — 3008F BODY MASS INDEX DOCD: CPT | Mod: CPTII,S$GLB,, | Performed by: SPECIALIST

## 2022-11-14 PROCEDURE — 3074F PR MOST RECENT SYSTOLIC BLOOD PRESSURE < 130 MM HG: ICD-10-PCS | Mod: CPTII,S$GLB,, | Performed by: SPECIALIST

## 2022-11-14 NOTE — PROGRESS NOTES
21 yo WF  presents for annual gyn eval  no overt gyn complaints  Past Medical History:   Diagnosis Date    Iron deficiency anemia 2018       Past Surgical History:   Procedure Laterality Date     SECTION N/A 2021    Procedure:  SECTION;  Surgeon: Rafael Rodriguez MD;  [facility]: [facility];  Service: OB/GYN;  Laterality: N/A;       Family History   Problem Relation Age of Onset    No Known Problems Mother     No Known Problems Father     Breast cancer Neg Hx     Ovarian cancer Neg Hx        Social History     Socioeconomic History    Marital status: Single   Tobacco Use    Smoking status: Every Day     Types: Vaping with nicotine    Smokeless tobacco: Never   Substance and Sexual Activity    Alcohol use: No    Drug use: No    Sexual activity: Yes     Partners: Male       Current Outpatient Medications   Medication Sig Dispense Refill    cetirizine (ZYRTEC) 10 MG tablet Take 1 tablet (10 mg total) by mouth once daily. 30 tablet 0    EScitalopram oxalate (LEXAPRO) 10 MG tablet Take 10 mg by mouth every morning.      EScitalopram oxalate (LEXAPRO) 5 MG Tab Take 5 mg by mouth once daily.      ferrous gluconate (FERGON) 240 (27 FE) MG tablet Take 2 tablets (480 mg total) by mouth 2 (two) times daily. 60 tablet 5    fluticasone propionate (FLONASE) 50 mcg/actuation nasal spray 1 spray (50 mcg total) by Each Nostril route once daily. 15.8 mL 0    multivit-min/iron/folic acid/K (ADULTS MULTIVITAMIN ORAL) TAKE 1 TABLET BY MOUTH ONCE DAILY.      ondansetron (ZOFRAN-ODT) 4 MG TbDL Take 1 tablet (4 mg total) by mouth every 6 (six) hours as needed (Nausea or vomiting). 30 tablet 0    prenatal vit-iron fum-folic ac 28 mg iron- 800 mcg Tab Take 1 tablet by mouth once daily. 30 tablet 11    traZODone (DESYREL) 50 MG tablet Take 25-50 mg by mouth nightly as needed.       No current facility-administered medications for this visit.       Review of patient's allergies indicates:  No Known  Allergies    Review of System:   General: no chills, fever, night sweats, weight gain or weight loss  Psychological: no depression or suicidal ideation  Breasts: no new or changing breast lumps, nipple discharge or masses.  Respiratory: no cough, shortness of breath, or wheezing  Cardiovascular: no chest pain or dyspnea on exertion  Gastrointestinal: no abdominal pain, change in bowel habits, or black or bloody stools  Genito-Urinary: no incontinence, urinary frequency/urgency or vulvar/vaginal symptoms, pelvic pain or abnormal vaginal bleeding.  Musculoskeletal: no gait disturbance or muscular weakness                                               General Appearance    A and O x 4, Cooperative, no distress   Breasts    Abdomen   Deferred    Soft, non-tender, bowel sounds active all four quadrants,  no masses, no organomegaly    Genitourinary:   External rectal exam shows no thrombosed external hemorrhoids.   Pelvic exam was performed with patient supine.  No labial fusion.  There is no rash, lesion or injury on the right labia.  There is no rash, lesion or injury on the left labia.  No bleeding and no signs of injury around the vaginal introitus, urethra is without lesions and well supported. The cervix is visualized with no discharge, lesions or friability.  No vaginal discharge found.  No significant Cystocele, Enterocele or rectocele, and uterus well supported.  Bimanual exam:  The urethra is normal to palpation and there are no palpable vaginal wall masses.  Uterus is not deviated, not enlarged, not fixed, normal shape and not tender.  Cervix exhibits no motion tenderness.   Right adnexum displays no mass and no tenderness.  Left adnexum displays no mass and no tenderness.   Extremities: Extremities normal, atraumatic, no cyanosis or edema                     NOTE  NURSING PERSONAL PRESENT FOR ENTIRE PHYSICAL EXAM     PAP submitted    Plan BSE monthly  RTO 2 years/prn

## 2022-11-17 ENCOUNTER — TELEPHONE (OUTPATIENT)
Dept: FAMILY MEDICINE | Facility: CLINIC | Age: 22
End: 2022-11-17

## 2022-11-17 NOTE — TELEPHONE ENCOUNTER
Got message from Infusion center to inform patient they need copy of insurance to schedule infusion.  Phone is not set up so a message was sent via My Chart.

## 2022-11-17 NOTE — TELEPHONE ENCOUNTER
----- Message from Sugar Mcneil sent at 11/15/2022 11:43 AM CST -----  Regarding: Insurance Cards  SECOND MESSAGE.  Can someone please contact the patient and see if she will send us a copy of her insurance card, front AND back so I can have the numbers on the back of the card to call to get authorization for her Iron Infusion.  I cannot obtain authorization without this information as I am having trouble finding the correct number to call for this without the insurance card.    Thanks  Sugar.

## 2022-11-23 ENCOUNTER — HOSPITAL ENCOUNTER (EMERGENCY)
Facility: HOSPITAL | Age: 22
Discharge: HOME | End: 2022-11-23
Attending: EMERGENCY MEDICINE
Payer: COMMERCIAL

## 2022-11-23 ENCOUNTER — PATIENT MESSAGE (OUTPATIENT)
Dept: FAMILY MEDICINE | Facility: CLINIC | Age: 22
End: 2022-11-23

## 2022-11-23 VITALS
WEIGHT: 230 LBS | HEIGHT: 66 IN | HEART RATE: 79 BPM | OXYGEN SATURATION: 91 % | SYSTOLIC BLOOD PRESSURE: 110 MMHG | RESPIRATION RATE: 16 BRPM | BODY MASS INDEX: 36.96 KG/M2 | TEMPERATURE: 98 F | DIASTOLIC BLOOD PRESSURE: 54 MMHG

## 2022-11-23 DIAGNOSIS — K80.20 CALCULUS OF GALLBLADDER WITHOUT CHOLECYSTITIS WITHOUT OBSTRUCTION: Primary | ICD-10-CM

## 2022-11-23 LAB
ALBUMIN SERPL BCP-MCNC: 3.8 G/DL (ref 3.5–5.2)
ALP SERPL-CCNC: 68 U/L (ref 55–135)
ALT SERPL W/O P-5'-P-CCNC: 11 U/L (ref 10–44)
ANION GAP SERPL CALC-SCNC: 8 MMOL/L (ref 8–16)
AST SERPL-CCNC: 13 U/L (ref 10–40)
B-HCG UR QL: NEGATIVE
BACTERIA #/AREA URNS HPF: ABNORMAL /HPF
BASOPHILS # BLD AUTO: 0.07 K/UL (ref 0–0.2)
BASOPHILS NFR BLD: 0.7 % (ref 0–1.9)
BILIRUB SERPL-MCNC: 0.5 MG/DL (ref 0.1–1)
BILIRUB UR QL STRIP: NEGATIVE
BUN SERPL-MCNC: 12 MG/DL (ref 6–20)
CALCIUM SERPL-MCNC: 9.1 MG/DL (ref 8.7–10.5)
CHLORIDE SERPL-SCNC: 110 MMOL/L (ref 95–110)
CLARITY UR: CLEAR
CO2 SERPL-SCNC: 20 MMOL/L (ref 23–29)
COLOR UR: YELLOW
CREAT SERPL-MCNC: 0.7 MG/DL (ref 0.5–1.4)
DIFFERENTIAL METHOD: ABNORMAL
EOSINOPHIL # BLD AUTO: 0.2 K/UL (ref 0–0.5)
EOSINOPHIL NFR BLD: 1.6 % (ref 0–8)
ERYTHROCYTE [DISTWIDTH] IN BLOOD BY AUTOMATED COUNT: 19.2 % (ref 11.5–14.5)
EST. GFR  (NO RACE VARIABLE): >60 ML/MIN/1.73 M^2
GLUCOSE SERPL-MCNC: 96 MG/DL (ref 70–110)
GLUCOSE UR QL STRIP: NEGATIVE
HCT VFR BLD AUTO: 27.8 % (ref 37–48.5)
HCV AB SERPL QL IA: NORMAL
HGB BLD-MCNC: 7.3 G/DL (ref 12–16)
HGB UR QL STRIP: NEGATIVE
HIV 1+2 AB+HIV1 P24 AG SERPL QL IA: NORMAL
IMM GRANULOCYTES # BLD AUTO: 0.02 K/UL (ref 0–0.04)
IMM GRANULOCYTES NFR BLD AUTO: 0.2 % (ref 0–0.5)
KETONES UR QL STRIP: NEGATIVE
LEUKOCYTE ESTERASE UR QL STRIP: ABNORMAL
LIPASE SERPL-CCNC: 50 U/L (ref 4–60)
LYMPHOCYTES # BLD AUTO: 2.3 K/UL (ref 1–4.8)
LYMPHOCYTES NFR BLD: 23.8 % (ref 18–48)
MCH RBC QN AUTO: 16.8 PG (ref 27–31)
MCHC RBC AUTO-ENTMCNC: 26.3 G/DL (ref 32–36)
MCV RBC AUTO: 64 FL (ref 82–98)
MICROSCOPIC COMMENT: ABNORMAL
MONOCYTES # BLD AUTO: 0.5 K/UL (ref 0.3–1)
MONOCYTES NFR BLD: 5.5 % (ref 4–15)
NEUTROPHILS # BLD AUTO: 6.6 K/UL (ref 1.8–7.7)
NEUTROPHILS NFR BLD: 68.2 % (ref 38–73)
NITRITE UR QL STRIP: NEGATIVE
NRBC BLD-RTO: 0 /100 WBC
PH UR STRIP: 6 [PH] (ref 5–8)
PLATELET # BLD AUTO: 368 K/UL (ref 150–450)
PMV BLD AUTO: 10 FL (ref 9.2–12.9)
POTASSIUM SERPL-SCNC: 4.2 MMOL/L (ref 3.5–5.1)
PROT SERPL-MCNC: 7.2 G/DL (ref 6–8.4)
PROT UR QL STRIP: NEGATIVE
RBC # BLD AUTO: 4.34 M/UL (ref 4–5.4)
RBC #/AREA URNS HPF: 1 /HPF (ref 0–4)
SODIUM SERPL-SCNC: 138 MMOL/L (ref 136–145)
SP GR UR STRIP: >=1.03 (ref 1–1.03)
SQUAMOUS #/AREA URNS HPF: 28 /HPF
URN SPEC COLLECT METH UR: ABNORMAL
UROBILINOGEN UR STRIP-ACNC: NEGATIVE EU/DL
WBC # BLD AUTO: 9.7 K/UL (ref 3.9–12.7)
WBC #/AREA URNS HPF: 4 /HPF (ref 0–5)

## 2022-11-23 PROCEDURE — 80053 COMPREHEN METABOLIC PANEL: CPT | Performed by: EMERGENCY MEDICINE

## 2022-11-23 PROCEDURE — 81025 URINE PREGNANCY TEST: CPT | Performed by: EMERGENCY MEDICINE

## 2022-11-23 PROCEDURE — 87389 HIV-1 AG W/HIV-1&-2 AB AG IA: CPT | Performed by: EMERGENCY MEDICINE

## 2022-11-23 PROCEDURE — 86803 HEPATITIS C AB TEST: CPT | Performed by: EMERGENCY MEDICINE

## 2022-11-23 PROCEDURE — 83690 ASSAY OF LIPASE: CPT | Performed by: EMERGENCY MEDICINE

## 2022-11-23 PROCEDURE — 36415 COLL VENOUS BLD VENIPUNCTURE: CPT | Performed by: EMERGENCY MEDICINE

## 2022-11-23 PROCEDURE — 85025 COMPLETE CBC W/AUTO DIFF WBC: CPT | Performed by: EMERGENCY MEDICINE

## 2022-11-23 PROCEDURE — 99284 EMERGENCY DEPT VISIT MOD MDM: CPT | Mod: 25

## 2022-11-23 PROCEDURE — 81000 URINALYSIS NONAUTO W/SCOPE: CPT | Performed by: EMERGENCY MEDICINE

## 2022-11-23 NOTE — ED PROVIDER NOTES
Encounter Date: 2022    SCRIBE #1 NOTE: I, Prieto Duff, am scribing for, and in the presence of,  Ismael Long MD.     History     Chief Complaint   Patient presents with    Abdominal Pain     RUQ abdomen/rib pain that radiates to LUQ abdomen and into middle back that started about 0500 today, denies N/V/D, Pt has h/o gallstones     Time seen by provider: 9:47 AM on 2022    Ricardo Rivera is a 22 y.o. female who presents to the ED with an onset of abdominal pain and nausea that began four and a half hours ago at 5 am. The patient denies fever, vomiting, or any other symptoms at this time. The patient states that she ate french fries yesterday night. The patient states that she has been unable to get her gallbladder removed. PMHx of iron deficiency and cholelithiasis. PSHx of  section.    The history is provided by the patient.   Review of patient's allergies indicates:  No Known Allergies  Past Medical History:   Diagnosis Date    Iron deficiency anemia 2018     Past Surgical History:   Procedure Laterality Date     SECTION N/A 2021    Procedure:  SECTION;  Surgeon: Rafael Rodriguez MD;  [facility]: [facility]D;  Service: OB/GYN;  Laterality: N/A;     Family History   Problem Relation Age of Onset    No Known Problems Mother     No Known Problems Father     Breast cancer Neg Hx     Ovarian cancer Neg Hx      Social History     Tobacco Use    Smoking status: Every Day     Types: Vaping with nicotine    Smokeless tobacco: Never   Substance Use Topics    Alcohol use: No    Drug use: No     Review of Systems   Constitutional:  Negative for fever.   HENT:  Negative for sore throat.    Respiratory:  Negative for shortness of breath.    Cardiovascular:  Negative for chest pain.   Gastrointestinal:  Positive for abdominal pain and nausea. Negative for vomiting.   Genitourinary:  Negative for dysuria.   Musculoskeletal:  Negative for back pain.   Skin:  Negative for  rash.   Neurological:  Negative for weakness.   Hematological:  Does not bruise/bleed easily.     Physical Exam     Initial Vitals [11/23/22 0938]   BP Pulse Resp Temp SpO2   (!) 117/53 86 16 98.6 °F (37 °C) 100 %      MAP       --         Physical Exam    Nursing note and vitals reviewed.  Constitutional: She appears well-developed and well-nourished. She is not diaphoretic. No distress.   HENT:   Head: Normocephalic and atraumatic.   Eyes: EOM are normal. Pupils are equal, round, and reactive to light.   Neck: Neck supple.   Normal range of motion.  Cardiovascular:  Normal rate, regular rhythm, normal heart sounds and intact distal pulses.     Exam reveals no gallop and no friction rub.       No murmur heard.  Pulmonary/Chest: Breath sounds normal. No respiratory distress. She has no wheezes. She has no rhonchi. She has no rales.   Abdominal: Abdomen is soft. Bowel sounds are normal. There is abdominal tenderness in the right upper quadrant. There is no rebound and no guarding.   Musculoskeletal:         General: Normal range of motion.      Cervical back: Normal range of motion and neck supple.     Neurological: She is alert and oriented to person, place, and time.   Skin: Skin is warm and dry.   Psychiatric: She has a normal mood and affect. Her behavior is normal. Judgment and thought content normal.       ED Course   Procedures  Labs Reviewed   COMPREHENSIVE METABOLIC PANEL - Abnormal; Notable for the following components:       Result Value    CO2 20 (*)     All other components within normal limits    Narrative:     Release to patient->Immediate   CBC W/ AUTO DIFFERENTIAL - Abnormal; Notable for the following components:    Hemoglobin 7.3 (*)     Hematocrit 27.8 (*)     MCV 64 (*)     MCH 16.8 (*)     MCHC 26.3 (*)     RDW 19.2 (*)     All other components within normal limits    Narrative:     Release to patient->Immediate   URINALYSIS, REFLEX TO URINE CULTURE - Abnormal; Notable for the following  components:    Specific Gravity, UA >=1.030 (*)     Leukocytes, UA Trace (*)     All other components within normal limits    Narrative:     Specimen Source->Urine   URINALYSIS MICROSCOPIC - Abnormal; Notable for the following components:    Bacteria Few (*)     All other components within normal limits    Narrative:     Specimen Source->Urine   LIPASE    Narrative:     Release to patient->Immediate   PREGNANCY TEST, URINE RAPID    Narrative:     Specimen Source->Urine   HIV 1 / 2 ANTIBODY   HEPATITIS C ANTIBODY          Imaging Results              US Abdomen Limited (Final result)  Result time 11/23/22 10:59:46      Final result by Trav Jacobson MD (11/23/22 10:59:46)                   Impression:      Cholelithiasis.    Hepatic steatosis.      Electronically signed by: Trav Jacobson MD  Date:    11/23/2022  Time:    10:59               Narrative:    EXAMINATION:  US ABDOMEN LIMITED    CLINICAL HISTORY:  ruq abd pain;    TECHNIQUE:  Limited ultrasound of the right upper quadrant of the abdomen (including pancreas, liver, gallbladder, common bile duct, and spleen) was performed.    COMPARISON:  07/01/2015    FINDINGS:  The pancreas is unremarkable.  Several stones are present within the gallbladder measuring up to 8 mm.  No wall thickening or pericholecystic fluid.  Sonographic Pedraza sign is negative.  The common bile duct is normal caliber measuring 2.6 mm.  The IVC is normal caliber.  The liver is normal in size without focal lesion.  It demonstrates increased parenchymal echogenicity in keeping with fatty infiltration.  The spleen is normal in size measuring 12.4 cm in length.                                       Medications - No data to display  Medical Decision Making:   History:   Old Medical Records: I decided to obtain old medical records.  Initial Assessment:   22-year-old female presents with upper abdominal pain.  Differential Diagnosis:   Initial differential diagnosis included but not  limited to cholecystitis, pancreatitis, and cholelithiasis.  Clinical Tests:   Lab Tests: Ordered and Reviewed  Radiological Study: Ordered and Reviewed  ED Management:  The patient was emergently evaluated in the emergency department, her evaluation was significant for a young female with mild right upper quadrant pain.  The patient's labs showed no acute abnormalities.  They did show the patient's chronic anemia.  The patient is not symptomatic from this anemia.  The patient's right upper quadrant ultrasound was significant for cholelithiasis without evidence of acute cholecystitis or choledocholithiasis.  The patient is stable for discharge to home.  She is instructed to follow-up with her general surgeon for further care.        Scribe Attestation:   Scribe #1: I performed the above scribed service and the documentation accurately describes the services I performed. I attest to the accuracy of the note.               I, Dr. Ismael Long, personally performed the services described in this documentation. All medical record entries made by the scribe were at my direction and in my presence.  I have reviewed the chart and agree that the record reflects my personal performance and is accurate and complete. Ismael Long MD.  1:28 PM 11/23/2022      Clinical Impression:   Final diagnoses:  [K80.20] Calculus of gallbladder without cholecystitis without obstruction (Primary)      ED Disposition Condition    Discharge           ED Prescriptions    None       Follow-up Information       Follow up With Specialties Details Why Contact Info    Trav Thomson MD General Surgery, Surgery Schedule an appointment as soon as possible for a visit   [address]  [address]  [facility] [address]  [phone]               Ismael Long MD  11/23/22 1910

## 2022-11-25 ENCOUNTER — PATIENT MESSAGE (OUTPATIENT)
Dept: FAMILY MEDICINE | Facility: CLINIC | Age: 22
End: 2022-11-25

## 2022-11-28 ENCOUNTER — PATIENT MESSAGE (OUTPATIENT)
Dept: FAMILY MEDICINE | Facility: CLINIC | Age: 22
End: 2022-11-28

## 2022-12-02 ENCOUNTER — PATIENT MESSAGE (OUTPATIENT)
Dept: SURGERY | Facility: CLINIC | Age: 22
End: 2022-12-02
Payer: COMMERCIAL

## 2022-12-02 ENCOUNTER — TELEPHONE (OUTPATIENT)
Dept: FAMILY MEDICINE | Facility: CLINIC | Age: 22
End: 2022-12-02

## 2022-12-02 NOTE — TELEPHONE ENCOUNTER
----- Message from Noy Gupta LPN sent at 12/2/2022 10:34 AM CST -----  Good Morning,    Ms Rivera has seen Dr Trav Thomson. in reference to her gallbladder and the need for surgical removal.  She was advised to follow up with her primary care for evaluation and plan for workup and management of her chronic anemia.  Patient is complaining of persistent pain and is wanting to schedule surgery.  Can she be cleared from a medical standpoint to proceed with this procedure?    Thank You    Noy GALVAN  [phone]  Fax:  [phone]

## 2022-12-04 VITALS
DIASTOLIC BLOOD PRESSURE: 59 MMHG | HEIGHT: 66 IN | SYSTOLIC BLOOD PRESSURE: 115 MMHG | RESPIRATION RATE: 17 BRPM | BODY MASS INDEX: 36.96 KG/M2 | WEIGHT: 230 LBS | OXYGEN SATURATION: 98 % | TEMPERATURE: 98 F | HEART RATE: 91 BPM

## 2022-12-04 LAB
ALBUMIN SERPL BCP-MCNC: 3.9 G/DL (ref 3.5–5.2)
ALP SERPL-CCNC: 95 U/L (ref 55–135)
ALT SERPL W/O P-5'-P-CCNC: 81 U/L (ref 10–44)
ANION GAP SERPL CALC-SCNC: 9 MMOL/L (ref 8–16)
AST SERPL-CCNC: 168 U/L (ref 10–40)
B-HCG UR QL: NEGATIVE
BASOPHILS # BLD AUTO: 0.07 K/UL (ref 0–0.2)
BASOPHILS NFR BLD: 0.6 % (ref 0–1.9)
BILIRUB SERPL-MCNC: 0.8 MG/DL (ref 0.1–1)
BUN SERPL-MCNC: 10 MG/DL (ref 6–20)
CALCIUM SERPL-MCNC: 9.4 MG/DL (ref 8.7–10.5)
CHLORIDE SERPL-SCNC: 109 MMOL/L (ref 95–110)
CO2 SERPL-SCNC: 22 MMOL/L (ref 23–29)
CREAT SERPL-MCNC: 0.7 MG/DL (ref 0.5–1.4)
DIFFERENTIAL METHOD: ABNORMAL
EOSINOPHIL # BLD AUTO: 0.1 K/UL (ref 0–0.5)
EOSINOPHIL NFR BLD: 0.8 % (ref 0–8)
ERYTHROCYTE [DISTWIDTH] IN BLOOD BY AUTOMATED COUNT: 19.9 % (ref 11.5–14.5)
EST. GFR  (NO RACE VARIABLE): >60 ML/MIN/1.73 M^2
GLUCOSE SERPL-MCNC: 100 MG/DL (ref 70–110)
HCT VFR BLD AUTO: 27.7 % (ref 37–48.5)
HGB BLD-MCNC: 7.2 G/DL (ref 12–16)
IMM GRANULOCYTES # BLD AUTO: 0.04 K/UL (ref 0–0.04)
IMM GRANULOCYTES NFR BLD AUTO: 0.4 % (ref 0–0.5)
LIPASE SERPL-CCNC: 64 U/L (ref 4–60)
LYMPHOCYTES # BLD AUTO: 2.1 K/UL (ref 1–4.8)
LYMPHOCYTES NFR BLD: 19.3 % (ref 18–48)
MCH RBC QN AUTO: 16.9 PG (ref 27–31)
MCHC RBC AUTO-ENTMCNC: 26 G/DL (ref 32–36)
MCV RBC AUTO: 65 FL (ref 82–98)
MONOCYTES # BLD AUTO: 0.7 K/UL (ref 0.3–1)
MONOCYTES NFR BLD: 6 % (ref 4–15)
NEUTROPHILS # BLD AUTO: 8.1 K/UL (ref 1.8–7.7)
NEUTROPHILS NFR BLD: 72.9 % (ref 38–73)
NRBC BLD-RTO: 0 /100 WBC
PLATELET # BLD AUTO: 467 K/UL (ref 150–450)
PMV BLD AUTO: 8.7 FL (ref 9.2–12.9)
POTASSIUM SERPL-SCNC: 3.7 MMOL/L (ref 3.5–5.1)
PROT SERPL-MCNC: 7.3 G/DL (ref 6–8.4)
RBC # BLD AUTO: 4.26 M/UL (ref 4–5.4)
SODIUM SERPL-SCNC: 140 MMOL/L (ref 136–145)
WBC # BLD AUTO: 11.09 K/UL (ref 3.9–12.7)

## 2022-12-04 PROCEDURE — 84484 ASSAY OF TROPONIN QUANT: CPT | Performed by: STUDENT IN AN ORGANIZED HEALTH CARE EDUCATION/TRAINING PROGRAM

## 2022-12-04 PROCEDURE — 99285 EMERGENCY DEPT VISIT HI MDM: CPT | Mod: 25

## 2022-12-04 PROCEDURE — 83690 ASSAY OF LIPASE: CPT | Performed by: STUDENT IN AN ORGANIZED HEALTH CARE EDUCATION/TRAINING PROGRAM

## 2022-12-04 PROCEDURE — 36415 COLL VENOUS BLD VENIPUNCTURE: CPT | Performed by: STUDENT IN AN ORGANIZED HEALTH CARE EDUCATION/TRAINING PROGRAM

## 2022-12-04 PROCEDURE — 80053 COMPREHEN METABOLIC PANEL: CPT | Performed by: STUDENT IN AN ORGANIZED HEALTH CARE EDUCATION/TRAINING PROGRAM

## 2022-12-04 PROCEDURE — 81025 URINE PREGNANCY TEST: CPT | Performed by: STUDENT IN AN ORGANIZED HEALTH CARE EDUCATION/TRAINING PROGRAM

## 2022-12-04 PROCEDURE — 85025 COMPLETE CBC W/AUTO DIFF WBC: CPT | Performed by: STUDENT IN AN ORGANIZED HEALTH CARE EDUCATION/TRAINING PROGRAM

## 2022-12-04 RX ORDER — DROPERIDOL 2.5 MG/ML
1.25 INJECTION, SOLUTION INTRAMUSCULAR; INTRAVENOUS ONCE
Status: COMPLETED | OUTPATIENT
Start: 2022-12-04 | End: 2022-12-05

## 2022-12-05 ENCOUNTER — HOSPITAL ENCOUNTER (EMERGENCY)
Facility: HOSPITAL | Age: 22
Discharge: HOME | End: 2022-12-05
Attending: STUDENT IN AN ORGANIZED HEALTH CARE EDUCATION/TRAINING PROGRAM
Payer: COMMERCIAL

## 2022-12-05 DIAGNOSIS — K80.20 CALCULUS OF GALLBLADDER WITHOUT CHOLECYSTITIS WITHOUT OBSTRUCTION: Primary | ICD-10-CM

## 2022-12-05 DIAGNOSIS — R10.11 RUQ ABDOMINAL PAIN: ICD-10-CM

## 2022-12-05 LAB — TROPONIN I SERPL DL<=0.01 NG/ML-MCNC: <0.006 NG/ML (ref 0–0.03)

## 2022-12-05 PROCEDURE — 96374 THER/PROPH/DIAG INJ IV PUSH: CPT

## 2022-12-05 PROCEDURE — 96361 HYDRATE IV INFUSION ADD-ON: CPT

## 2022-12-05 PROCEDURE — 25000003 PHARM REV CODE 250: Performed by: STUDENT IN AN ORGANIZED HEALTH CARE EDUCATION/TRAINING PROGRAM

## 2022-12-05 PROCEDURE — 63600175 PHARM REV CODE 636 W HCPCS: Performed by: STUDENT IN AN ORGANIZED HEALTH CARE EDUCATION/TRAINING PROGRAM

## 2022-12-05 PROCEDURE — 25500020 PHARM REV CODE 255

## 2022-12-05 RX ORDER — HYDROCODONE BITARTRATE AND ACETAMINOPHEN 7.5; 325 MG/1; MG/1
1 TABLET ORAL EVERY 6 HOURS PRN
Qty: 8 TABLET | Refills: 0 | Status: SHIPPED | OUTPATIENT
Start: 2022-12-05 | End: 2022-12-14

## 2022-12-05 RX ADMIN — IOHEXOL 100 ML: 350 INJECTION, SOLUTION INTRAVENOUS at 12:12

## 2022-12-05 RX ADMIN — SODIUM CHLORIDE 1000 ML: 0.9 INJECTION, SOLUTION INTRAVENOUS at 02:12

## 2022-12-05 RX ADMIN — DROPERIDOL 1.25 MG: 2.5 INJECTION, SOLUTION INTRAMUSCULAR; INTRAVENOUS at 02:12

## 2022-12-05 NOTE — ED PROVIDER NOTES
Encounter Date: 2022       History     Chief Complaint   Patient presents with    Abdominal Pain     Pt c/o abdominal pain, she states she has had gallstones diagnosed in October but this is the worst pain she has had since then.      Nausea     Pain through chest and upper abd worse than normal this evening. H/o Gallstones. Has symptoms 1x per week.  On reassessment after roomed, patient states has follow-up with PCP and does not have follow-up with surgery as prior mentioned. Pain for 5 hours persistent and unchanged.  Nausea without vomiting.  No fever or chills.  No confusion or altered mental status.    The history is provided by the patient and medical records.   Review of patient's allergies indicates:  No Known Allergies  Past Medical History:   Diagnosis Date    Iron deficiency anemia 2018     Past Surgical History:   Procedure Laterality Date     SECTION N/A 2021    Procedure:  SECTION;  Surgeon: Rafael Rodriguez MD;  [facility];  Service: OB/GYN;  Laterality: N/A;     Family History   Problem Relation Age of Onset    No Known Problems Mother     No Known Problems Father     Breast cancer Neg Hx     Ovarian cancer Neg Hx      Social History     Tobacco Use    Smoking status: Every Day     Types: Vaping with nicotine    Smokeless tobacco: Never   Substance Use Topics    Alcohol use: No    Drug use: No     Review of Systems   Constitutional:  Negative for chills and fever.   HENT:  Negative for congestion, dental problem, ear pain, facial swelling, nosebleeds and sinus pressure.    Eyes:  Negative for pain and discharge.   Respiratory:  Negative for cough, shortness of breath and stridor.    Cardiovascular:  Positive for chest pain. Negative for palpitations.   Gastrointestinal:  Positive for abdominal pain and nausea. Negative for abdominal distention, constipation, diarrhea and vomiting.   Genitourinary:  Negative for difficulty urinating, flank pain, frequency and  urgency.   Musculoskeletal:  Negative for arthralgias, myalgias and neck pain.   Skin:  Negative for rash and wound.   Neurological:  Negative for dizziness, syncope, weakness, numbness and headaches.   Psychiatric/Behavioral:  Negative for hallucinations and suicidal ideas.      Physical Exam     Initial Vitals [12/04/22 2126]   BP Pulse Resp Temp SpO2   (!) 115/59 91 17 98.4 °F (36.9 °C) 98 %      MAP       --         Physical Exam    Nursing note and vitals reviewed.  Constitutional: She appears well-developed and well-nourished. No distress.   HENT:   Head: Normocephalic and atraumatic.   Right Ear: External ear normal.   Left Ear: External ear normal.   Nose: Nose normal.   Mouth/Throat: Oropharynx is clear and moist.   Eyes: Conjunctivae and EOM are normal. Pupils are equal, round, and reactive to light.   Neck: Neck supple.   Normal range of motion.  Cardiovascular:  Normal rate, regular rhythm, normal heart sounds and intact distal pulses.           Pulmonary/Chest: Breath sounds normal. No stridor. No respiratory distress. She has no wheezes. She has no rhonchi. She has no rales.   Abdominal: Abdomen is soft. Bowel sounds are normal. There is abdominal tenderness in the right upper quadrant. There is no rebound, no guarding and negative Pedraza's sign.   Musculoskeletal:         General: No tenderness or edema. Normal range of motion.      Cervical back: Normal range of motion and neck supple.     Neurological: She is alert and oriented to person, place, and time. She has normal strength. No cranial nerve deficit or sensory deficit.   Skin: Skin is warm and dry. No rash noted.   Psychiatric: She has a normal mood and affect. Thought content normal.       ED Course   Procedures  Labs Reviewed   CBC W/ AUTO DIFFERENTIAL - Abnormal; Notable for the following components:       Result Value    Hemoglobin 7.2 (*)     Hematocrit 27.7 (*)     MCV 65 (*)     MCH 16.9 (*)     MCHC 26.0 (*)     RDW 19.9 (*)      Platelets 467 (*)     MPV 8.7 (*)     Gran # (ANC) 8.1 (*)     All other components within normal limits   COMPREHENSIVE METABOLIC PANEL - Abnormal; Notable for the following components:    CO2 22 (*)      (*)     ALT 81 (*)     All other components within normal limits   LIPASE - Abnormal; Notable for the following components:    Lipase 64 (*)     All other components within normal limits   TROPONIN I   PREGNANCY TEST, URINE RAPID    Narrative:     Specimen Source->Urine          Imaging Results              US Abdomen Limited (Gallbladder) (In process)                      CT Abdomen Pelvis With Contrast (In process)  Result time 12/05/22 00:07:53                     Medications   sodium chloride 0.9% bolus 1,000 mL (0 mLs Intravenous Stopped 12/5/22 0326)   droperidoL injection 1.25 mg (1.25 mg Intravenous Given 12/5/22 0209)   iohexoL (OMNIPAQUE 350) 350 mg iodine/mL injection (100 mLs  Given 12/5/22 0033)     Medical Decision Making:   Clinical Tests:   Lab Tests: Ordered and Reviewed  Radiological Study: Ordered and Reviewed  Referral placed to general surgery given patient's frequent colic episodes.  No signs of cholecystitis on either CT or ultrasound today.  Appears safe for discharge with improvement of symptoms while in department.  Return to ED precautions given for worsening or changing symptoms.           ED Course as of 12/05/22 0652   Mon Dec 05, 2022   0258 VRAD: cholelithiasis without cholecystitis [KB]      ED Course User Index  [KB] John Cagle MD                 Clinical Impression:   Final diagnoses:  [R10.11] RUQ abdominal pain  [K80.20] Calculus of gallbladder without cholecystitis without obstruction (Primary)        ED Disposition Condition    Discharge Stable          ED Prescriptions       Medication Sig Dispense Start Date End Date Auth. Provider    HYDROcodone-acetaminophen (NORCO) 7.5-325 mg per tablet Take 1 tablet by mouth every 6 (six) hours as needed for Pain. 8 tablet  12/5/2022 -- John Cagle MD          Follow-up Information       Follow up With Specialties Details Why Contact Info    [facility] GENERAL SURGERY General Surgery Schedule an appointment as soon as possible for a visit  For follow-up on today's visit. [address] [place] [phone]  [phone]    [facility] Emergency Dept Emergency Medicine Go to  As needed, If symptoms worsen [address] [place] [id number]  [phone]             John Cagle MD  12/05/22 0652

## 2022-12-05 NOTE — Clinical Note
"Ricardo Tobarbrie Rivera was seen and treated in our emergency department on 12/4/2022.  She may return to work on 12/06/2022.       If you have any questions or concerns, please don't hesitate to call.      John Cagle MD"

## 2022-12-05 NOTE — FIRST PROVIDER EVALUATION
"Medical screening examination initiated.  I have conducted a focused provider triage encounter, findings are as follows:    Brief history of present illness:  Pain through chest and upper abd worse than normal this evening. H/o Gallstones. Has symptoms 1x per week. Has met with surgery. Pain for 5 hours persistent and unchanged.    Vitals:    12/04/22 2126   BP: (!) 115/59   BP Location: Right arm   Patient Position: Sitting   Pulse: 91   Resp: 17   Temp: 98.4 °F (36.9 °C)   TempSrc: Oral   SpO2: 98%   Weight: 104.3 kg (230 lb)   Height: 5' 6" (1.676 m)       Pertinent physical exam:  mild RUQ abd tenderness    Brief workup plan:  cbc, lipase, cmp, CT Scan    Preliminary workup initiated; this workup will be continued and followed by the physician or advanced practice provider that is assigned to the patient when roomed.  "

## 2022-12-06 ENCOUNTER — PATIENT MESSAGE (OUTPATIENT)
Dept: SURGERY | Facility: CLINIC | Age: 22
End: 2022-12-06
Payer: COMMERCIAL

## 2022-12-14 ENCOUNTER — OFFICE VISIT (OUTPATIENT)
Dept: FAMILY MEDICINE | Facility: CLINIC | Age: 22
End: 2022-12-14
Payer: COMMERCIAL

## 2022-12-14 VITALS
TEMPERATURE: 98 F | OXYGEN SATURATION: 96 % | DIASTOLIC BLOOD PRESSURE: 68 MMHG | HEIGHT: 66 IN | HEART RATE: 91 BPM | RESPIRATION RATE: 16 BRPM | SYSTOLIC BLOOD PRESSURE: 126 MMHG | WEIGHT: 227.5 LBS | BODY MASS INDEX: 36.56 KG/M2

## 2022-12-14 DIAGNOSIS — D50.0 IRON DEFICIENCY ANEMIA DUE TO CHRONIC BLOOD LOSS: Primary | ICD-10-CM

## 2022-12-14 DIAGNOSIS — N92.4 MENORRHAGIA, PREMENOPAUSAL: ICD-10-CM

## 2022-12-14 PROCEDURE — 99213 PR OFFICE/OUTPT VISIT, EST, LEVL III, 20-29 MIN: ICD-10-PCS | Mod: S$GLB,,, | Performed by: FAMILY MEDICINE

## 2022-12-14 PROCEDURE — 3008F BODY MASS INDEX DOCD: CPT | Mod: CPTII,S$GLB,, | Performed by: FAMILY MEDICINE

## 2022-12-14 PROCEDURE — 3008F PR BODY MASS INDEX (BMI) DOCUMENTED: ICD-10-PCS | Mod: CPTII,S$GLB,, | Performed by: FAMILY MEDICINE

## 2022-12-14 PROCEDURE — 3078F PR MOST RECENT DIASTOLIC BLOOD PRESSURE < 80 MM HG: ICD-10-PCS | Mod: CPTII,S$GLB,, | Performed by: FAMILY MEDICINE

## 2022-12-14 PROCEDURE — 3078F DIAST BP <80 MM HG: CPT | Mod: CPTII,S$GLB,, | Performed by: FAMILY MEDICINE

## 2022-12-14 PROCEDURE — 3074F PR MOST RECENT SYSTOLIC BLOOD PRESSURE < 130 MM HG: ICD-10-PCS | Mod: CPTII,S$GLB,, | Performed by: FAMILY MEDICINE

## 2022-12-14 PROCEDURE — 3074F SYST BP LT 130 MM HG: CPT | Mod: CPTII,S$GLB,, | Performed by: FAMILY MEDICINE

## 2022-12-14 PROCEDURE — 99213 OFFICE O/P EST LOW 20 MIN: CPT | Mod: S$GLB,,, | Performed by: FAMILY MEDICINE

## 2022-12-14 NOTE — PROGRESS NOTES
Subjective:       Patient ID: Ricardo Rivera is a 22 y.o. female.    Chief Complaint: surg clear      Patient is here for surgical clearance she has gallstones in is having elective cholecystectomy.   He had significant nausea but no vomiting she does have intermittent episodes of pain at this time  Patient Active Problem List:     Iron deficiency anemia     Iron deficiency anemia due to chronic blood loss     Carpal tunnel syndrome, right     Anxiety and depression     Engorgement of breasts, postpartum condition or complication     Bilateral pulmonary embolism this was secondary to birth control has been off birth control since then.     Bilateral pulmonary embolism     Acute septic pulmonary embolism with acute cor pulmonale     Menorrhagia, premenopausal     Calculus of gallbladder without cholecystitis without obstruction    BP Readings from Last 3 Encounters:  12/14/22 : 126/68  12/04/22 : (!) 115/59  11/23/22 : (!) 110/54  Lab Results       Component                Value               Date                       WBC                      11.09               12/04/2022                 HGB                      7.2 (L)             12/04/2022                 HCT                      27.7 (L)            12/04/2022                 PLT                      467 (H)             12/04/2022                 CHOL                     170                 11/02/2021                 TRIG                     107                 11/02/2021                 HDL                      41 (L)              11/02/2021                 ALT                      81 (H)              12/04/2022                 AST                      168 (H)             12/04/2022                 NA                       140                 12/04/2022                 K                        3.7                 12/04/2022                 CL                       109                 12/04/2022                 CREATININE               0.7                  12/04/2022                 BUN                      10                  12/04/2022                 CO2                      22 (L)              12/04/2022                 TSH                      1.923               12/10/2020                 INR                      1.0                 10/26/2021                 GLUF                     89                  05/25/2021                    Allergies and Medications:   Review of patient's allergies indicates:  No Known Allergies  Current Outpatient Medications   Medication Sig Dispense Refill    EScitalopram oxalate (LEXAPRO) 10 MG tablet Take 10 mg by mouth every morning.      ferrous gluconate (FERGON) 240 (27 FE) MG tablet Take 2 tablets (480 mg total) by mouth 2 (two) times daily. 60 tablet 5     No current facility-administered medications for this visit.       Family History:   Family History   Problem Relation Age of Onset    No Known Problems Mother     No Known Problems Father     Breast cancer Neg Hx     Ovarian cancer Neg Hx        Social History:   Social History     Socioeconomic History    Marital status: Single   Tobacco Use    Smoking status: Every Day     Types: Vaping with nicotine    Smokeless tobacco: Never   Substance and Sexual Activity    Alcohol use: No    Drug use: No    Sexual activity: Yes     Partners: Male       Review of Systems    Objective:     Vitals:    12/14/22 1133   BP: 126/68   Pulse: 91   Resp: 16   Temp: 98.4 °F (36.9 °C)        Physical Exam  Vitals and nursing note reviewed.   Constitutional:       General: She is not in acute distress.     Appearance: Normal appearance. She is well-developed and normal weight. She is not ill-appearing, toxic-appearing or diaphoretic.   HENT:      Head: Normocephalic and atraumatic.   Eyes:      Pupils: Pupils are equal, round, and reactive to light.   Cardiovascular:      Rate and Rhythm: Normal rate and regular rhythm.      Heart sounds: Normal heart sounds. No murmur heard.    No  friction rub. No gallop.   Pulmonary:      Effort: Pulmonary effort is normal. No respiratory distress.      Breath sounds: Normal breath sounds. No stridor. No wheezing, rhonchi or rales.   Chest:      Chest wall: No tenderness.   Musculoskeletal:      Right lower leg: No edema.      Left lower leg: No edema.   Skin:     Comments: Mild skin pallor but skin turgor and peripheral capillary refill are intact.   Neurological:      Mental Status: She is alert.   Psychiatric:         Behavior: Behavior normal.         Thought Content: Thought content normal.         Judgment: Judgment normal.       Assessment:       1. Iron deficiency anemia due to chronic blood loss    2. Menorrhagia, premenopausal        Plan:       Ricardo was seen today for surg clear.    Diagnoses and all orders for this visit:    Iron deficiency anemia due to chronic blood loss  -     Cancel: Ambulatory referral/consult to Oncology; Future  -     Ambulatory referral/consult to Obstetrics / Gynecology; Future  -     Ambulatory referral/consult to Oncology; Future    Menorrhagia, premenopausal  -     Cancel: Ambulatory referral/consult to Oncology; Future  -     Ambulatory referral/consult to Obstetrics / Gynecology; Future  -     Ambulatory referral/consult to Oncology; Future         No follow-ups on file.

## 2022-12-14 NOTE — LETTER
[facility] NETWORK      Alok Angeles MD              [facility] / INTERNAL MEDICINE  [address] [place] [id number]  Dept: [phone]  Dept Fax: [phone]  Loc: [phone]                                                                                                     Re: Name: Ricardo Rivera          : 2000        Ricardo Rivera has been examined by me on 2022.  She suffers with chronic anemia secondary to menorrhagia and her hemoglobin has dropped from 7.6-7.2.  I feel that she possibly could be cleared when her hemoglobin is 8 but she has not responded to iron replacement and infusion therapy has been hard to set up I put in a referral to her hematologist for evaluation and hopefully expedite her clearance down the road.       ___ General Anesthesia       ___ Sports Participation     ___ School /      ___ Camp        Sincerely,        Alok Angeles MD

## 2022-12-15 ENCOUNTER — PATIENT MESSAGE (OUTPATIENT)
Dept: FAMILY MEDICINE | Facility: CLINIC | Age: 22
End: 2022-12-15

## 2022-12-21 ENCOUNTER — TELEPHONE (OUTPATIENT)
Dept: HEMATOLOGY/ONCOLOGY | Facility: CLINIC | Age: 22
End: 2022-12-21

## 2022-12-21 NOTE — TELEPHONE ENCOUNTER
Talked with patient. Appointments for referrals are taken care off.  She states she is still in a lot of pain .  She will call office if she needs something.

## 2022-12-23 ENCOUNTER — OFFICE VISIT (OUTPATIENT)
Dept: HEMATOLOGY/ONCOLOGY | Facility: CLINIC | Age: 22
End: 2022-12-23
Payer: COMMERCIAL

## 2022-12-23 VITALS
HEIGHT: 66 IN | RESPIRATION RATE: 18 BRPM | BODY MASS INDEX: 35.68 KG/M2 | HEART RATE: 79 BPM | DIASTOLIC BLOOD PRESSURE: 59 MMHG | TEMPERATURE: 98 F | SYSTOLIC BLOOD PRESSURE: 109 MMHG | WEIGHT: 222 LBS

## 2022-12-23 DIAGNOSIS — D50.0 IRON DEFICIENCY ANEMIA DUE TO CHRONIC BLOOD LOSS: ICD-10-CM

## 2022-12-23 DIAGNOSIS — N92.4 MENORRHAGIA, PREMENOPAUSAL: ICD-10-CM

## 2022-12-23 PROCEDURE — 3078F DIAST BP <80 MM HG: CPT | Mod: CPTII,S$GLB,, | Performed by: INTERNAL MEDICINE

## 2022-12-23 PROCEDURE — 1159F MED LIST DOCD IN RCRD: CPT | Mod: CPTII,S$GLB,, | Performed by: INTERNAL MEDICINE

## 2022-12-23 PROCEDURE — 1159F PR MEDICATION LIST DOCUMENTED IN MEDICAL RECORD: ICD-10-PCS | Mod: CPTII,S$GLB,, | Performed by: INTERNAL MEDICINE

## 2022-12-23 PROCEDURE — 3008F BODY MASS INDEX DOCD: CPT | Mod: CPTII,S$GLB,, | Performed by: INTERNAL MEDICINE

## 2022-12-23 PROCEDURE — 3008F PR BODY MASS INDEX (BMI) DOCUMENTED: ICD-10-PCS | Mod: CPTII,S$GLB,, | Performed by: INTERNAL MEDICINE

## 2022-12-23 PROCEDURE — 99204 OFFICE O/P NEW MOD 45 MIN: CPT | Mod: S$GLB,,, | Performed by: INTERNAL MEDICINE

## 2022-12-23 PROCEDURE — 3074F SYST BP LT 130 MM HG: CPT | Mod: CPTII,S$GLB,, | Performed by: INTERNAL MEDICINE

## 2022-12-23 PROCEDURE — 3078F PR MOST RECENT DIASTOLIC BLOOD PRESSURE < 80 MM HG: ICD-10-PCS | Mod: CPTII,S$GLB,, | Performed by: INTERNAL MEDICINE

## 2022-12-23 PROCEDURE — 3074F PR MOST RECENT SYSTOLIC BLOOD PRESSURE < 130 MM HG: ICD-10-PCS | Mod: CPTII,S$GLB,, | Performed by: INTERNAL MEDICINE

## 2022-12-23 PROCEDURE — 99204 PR OFFICE/OUTPT VISIT, NEW, LEVL IV, 45-59 MIN: ICD-10-PCS | Mod: S$GLB,,, | Performed by: INTERNAL MEDICINE

## 2022-12-23 NOTE — PROGRESS NOTES
INITIAL [facility] HEM/ONC CONSULTATION      Subjective:       Patient ID: Ricardo Rivera is a 22 y.o. female.    Chief Complaint: No chief complaint on file.  Iron deficiency anemia    Ms. Rivera is a 23yo female who was diagnosed with iron deficiency anemia at 15 years of age.  She was seeing pediatric oncology at [facility] since 15 years old.  This was attributed to heavy menses and she was placed on birth control.  She unfortunately diagnosed with pulmonary embolism in  and this was discontinued.   She has historically been treated with iron supplementation with oral iron but this makes her very constipated so she has discontinued this.  She has been treated with IV iron on 3 different occasions and has not had any allergic issues with this.      H      Past Medical History:   Diagnosis Date    Iron deficiency anemia 2018       Past Surgical History:   Procedure Laterality Date     SECTION N/A 2021    Procedure:  SECTION;  Surgeon: Rafael Rodriguez MD;  Location: [facility];  Service: OB/GYN;  Laterality: N/A;       Social History     Socioeconomic History    Marital status: Single   Tobacco Use    Smoking status: Every Day     Types: Vaping with nicotine    Smokeless tobacco: Never   Substance and Sexual Activity    Alcohol use: No    Drug use: No    Sexual activity: Yes     Partners: Male       Family History   Problem Relation Age of Onset    No Known Problems Mother     No Known Problems Father     Breast cancer Neg Hx     Ovarian cancer Neg Hx        Review of patient's allergies indicates:  No Known Allergies    Current Outpatient Medications:     EScitalopram oxalate (LEXAPRO) 10 MG tablet, Take 10 mg by mouth every morning., Disp: , Rfl:     ferrous gluconate (FERGON) 240 (27 FE) MG tablet, Take 2 tablets (480 mg total) by mouth 2 (two) times daily., Disp: 60 tablet, Rfl: 5    All medications and past history have been reviewed.    Review of Systems   Constitutional:   "Negative for fever.   Respiratory:  Negative for shortness of breath.    Cardiovascular:  Negative for chest pain and leg swelling.   Gastrointestinal:  Negative for abdominal pain and blood in stool.   Genitourinary:  Negative for hematuria.   Skin:  Negative for rash.     Objective:        BP (!) 109/59   Pulse 79   Temp 98.1 °F (36.7 °C)   Resp 18   Ht 5' 6" (1.676 m)   Wt 100.7 kg (222 lb)   LMP 11/27/2022 (Approximate)   BMI 35.83 kg/m²     Physical Exam  Constitutional:       Appearance: She is well-developed.   HENT:      Head: Normocephalic and atraumatic.      Right Ear: External ear normal.      Left Ear: External ear normal.   Eyes:      Conjunctiva/sclera: Conjunctivae normal.      Pupils: Pupils are equal, round, and reactive to light.   Neck:      Thyroid: No thyromegaly.      Trachea: No tracheal deviation.   Cardiovascular:      Rate and Rhythm: Normal rate and regular rhythm.      Heart sounds: Normal heart sounds.   Pulmonary:      Effort: Pulmonary effort is normal.      Breath sounds: Normal breath sounds.   Abdominal:      General: Bowel sounds are normal. There is no distension.      Palpations: Abdomen is soft. There is no mass.      Tenderness: There is no abdominal tenderness.   Skin:     Findings: No rash.   Neurological:      Comments: Neuro intact througout   Psychiatric:         Behavior: Behavior normal.         Thought Content: Thought content normal.         Judgment: Judgment normal.         Lab  No results found for this or any previous visit (from the past 336 hour(s)).  CMP  Sodium   Date Value Ref Range Status   12/04/2022 140 136 - 145 mmol/L Final     Potassium   Date Value Ref Range Status   12/04/2022 3.7 3.5 - 5.1 mmol/L Final     Chloride   Date Value Ref Range Status   12/04/2022 109 95 - 110 mmol/L Final     CO2   Date Value Ref Range Status   12/04/2022 22 (L) 23 - 29 mmol/L Final     Glucose   Date Value Ref Range Status   12/04/2022 100 70 - 110 mg/dL Final "     BUN   Date Value Ref Range Status   12/04/2022 10 6 - 20 mg/dL Final     Creatinine   Date Value Ref Range Status   12/04/2022 0.7 0.5 - 1.4 mg/dL Final     Calcium   Date Value Ref Range Status   12/04/2022 9.4 8.7 - 10.5 mg/dL Final     Total Protein   Date Value Ref Range Status   12/04/2022 7.3 6.0 - 8.4 g/dL Final     Albumin   Date Value Ref Range Status   12/04/2022 3.9 3.5 - 5.2 g/dL Final     Total Bilirubin   Date Value Ref Range Status   12/04/2022 0.8 0.1 - 1.0 mg/dL Final     Comment:     For infants and newborns, interpretation of results should be based  on gestational age, weight and in agreement with clinical  observations.    Premature Infant recommended reference ranges:  Up to 24 hours.............<8.0 mg/dL  Up to 48 hours............<12.0 mg/dL  3-5 days..................<15.0 mg/dL  6-29 days.................<15.0 mg/dL       Alkaline Phosphatase   Date Value Ref Range Status   12/04/2022 95 55 - 135 U/L Final     AST   Date Value Ref Range Status   12/04/2022 168 (H) 10 - 40 U/L Final     ALT   Date Value Ref Range Status   12/04/2022 81 (H) 10 - 44 U/L Final     Anion Gap   Date Value Ref Range Status   12/04/2022 9 8 - 16 mmol/L Final     eGFR if    Date Value Ref Range Status   10/27/2021 >60 >60 mL/min/1.73 m^2 Final     eGFR if non    Date Value Ref Range Status   10/27/2021 >60 >60 mL/min/1.73 m^2 Final     Comment:     Calculation used to obtain the estimated glomerular filtration  rate (eGFR) is the CKD-EPI equation.            Specimen (24h ago, onward)      None                  All lab results and imaging results have been reviewed and discussed with the patient.     Assessment:       1. Iron deficiency anemia due to chronic blood loss    2. Menorrhagia, premenopausal      Problem List Items Addressed This Visit       Menorrhagia, premenopausal    Iron deficiency anemia due to chronic blood loss     Ms. Rivera has a long history of iron  deficiency due to heavy menstrual cycles.  She still is having 6 days cycles and using the most absorbant pads and I feel this has continued her iron deficiency problem.  She has been on iron orally on many occasions and is quite intolerant to this medication due to constipation.      Given the severity of her iron deficiency and lack of tolerance to oral iron, I feel IV iron is the best approach here.  She has been on Ferrlicit in the past and she does not seem to have benefited greatly so will try injectafer to see if this improves her counts.      She states she is in need of gallbladder surgery and needs her Hb up for this.  Will hopefully be able to get this up quickly but it may take a few weeks.      Lastly,  I feel that moving into the future that she will likely need regular IV iron therapy and will explore this further after she gets started and hopefully improved.           Relevant Orders    CBC Auto Differential    Ferritin      Cancer Staging   No matching staging information was found for the patient.      Plan:         Follow up in about 4 weeks (around 1/20/2023).       The plan was discussed with the patient and all questions/concerns have been answered to the patient's satisfaction.

## 2022-12-23 NOTE — ASSESSMENT & PLAN NOTE
Ms. Rivera has a long history of iron deficiency due to heavy menstrual cycles.  She still is having 6 days cycles and using the most absorbant pads and I feel this has continued her iron deficiency problem.  She has been on iron orally on many occasions and is quite intolerant to this medication due to constipation.      Given the severity of her iron deficiency and lack of tolerance to oral iron, I feel IV iron is the best approach here.  She has been on Ferrlicit in the past and she does not seem to have benefited greatly so will try injectafer to see if this improves her counts.      She states she is in need of gallbladder surgery and needs her Hb up for this.  Will hopefully be able to get this up quickly but it may take a few weeks.      Lastly,  I feel that moving into the future that she will likely need regular IV iron therapy and will explore this further after she gets started and hopefully improved.

## 2022-12-30 RX ORDER — HEPARIN 100 UNIT/ML
500 SYRINGE INTRAVENOUS
Status: CANCELLED | OUTPATIENT
Start: 2022-12-30

## 2022-12-30 RX ORDER — SODIUM CHLORIDE 0.9 % (FLUSH) 0.9 %
10 SYRINGE (ML) INJECTION
Status: CANCELLED | OUTPATIENT
Start: 2022-12-30

## 2023-01-13 ENCOUNTER — LAB VISIT (OUTPATIENT)
Dept: LAB | Facility: HOSPITAL | Age: 23
End: 2023-01-13
Attending: INTERNAL MEDICINE
Payer: COMMERCIAL

## 2023-01-13 DIAGNOSIS — D50.0 IRON DEFICIENCY ANEMIA DUE TO CHRONIC BLOOD LOSS: ICD-10-CM

## 2023-01-13 LAB
ANISOCYTOSIS BLD QL SMEAR: ABNORMAL
BASOPHILS # BLD AUTO: 0.08 K/UL (ref 0–0.2)
BASOPHILS NFR BLD: 1.1 % (ref 0–1.9)
DACRYOCYTES BLD QL SMEAR: ABNORMAL
DIFFERENTIAL METHOD: ABNORMAL
EOSINOPHIL # BLD AUTO: 0.1 K/UL (ref 0–0.5)
EOSINOPHIL NFR BLD: 1.3 % (ref 0–8)
ERYTHROCYTE [DISTWIDTH] IN BLOOD BY AUTOMATED COUNT: 19 % (ref 11.5–14.5)
FERRITIN SERPL-MCNC: 2 NG/ML (ref 20–300)
HCT VFR BLD AUTO: 29.2 % (ref 37–48.5)
HGB BLD-MCNC: 7.6 G/DL (ref 12–16)
HYPOCHROMIA BLD QL SMEAR: ABNORMAL
IMM GRANULOCYTES # BLD AUTO: 0.02 K/UL (ref 0–0.04)
IMM GRANULOCYTES NFR BLD AUTO: 0.3 % (ref 0–0.5)
LYMPHOCYTES # BLD AUTO: 2.1 K/UL (ref 1–4.8)
LYMPHOCYTES NFR BLD: 30.1 % (ref 18–48)
MCH RBC QN AUTO: 16.5 PG (ref 27–31)
MCHC RBC AUTO-ENTMCNC: 26 G/DL (ref 32–36)
MCV RBC AUTO: 63 FL (ref 82–98)
MONOCYTES # BLD AUTO: 0.6 K/UL (ref 0.3–1)
MONOCYTES NFR BLD: 8.6 % (ref 4–15)
NEUTROPHILS # BLD AUTO: 4.2 K/UL (ref 1.8–7.7)
NEUTROPHILS NFR BLD: 58.6 % (ref 38–73)
NRBC BLD-RTO: 0 /100 WBC
OVALOCYTES BLD QL SMEAR: ABNORMAL
PLATELET # BLD AUTO: 360 K/UL (ref 150–450)
PLATELET BLD QL SMEAR: ABNORMAL
PMV BLD AUTO: 9.7 FL (ref 9.2–12.9)
POIKILOCYTOSIS BLD QL SMEAR: SLIGHT
RBC # BLD AUTO: 4.61 M/UL (ref 4–5.4)
WBC # BLD AUTO: 7.11 K/UL (ref 3.9–12.7)

## 2023-01-13 PROCEDURE — 85025 COMPLETE CBC W/AUTO DIFF WBC: CPT | Performed by: INTERNAL MEDICINE

## 2023-01-13 PROCEDURE — 36415 COLL VENOUS BLD VENIPUNCTURE: CPT | Performed by: INTERNAL MEDICINE

## 2023-01-13 PROCEDURE — 82728 ASSAY OF FERRITIN: CPT | Performed by: INTERNAL MEDICINE

## 2023-01-19 ENCOUNTER — OFFICE VISIT (OUTPATIENT)
Dept: HEMATOLOGY/ONCOLOGY | Facility: CLINIC | Age: 23
End: 2023-01-19
Payer: COMMERCIAL

## 2023-01-19 ENCOUNTER — INFUSION (OUTPATIENT)
Dept: INFUSION THERAPY | Facility: HOSPITAL | Age: 23
End: 2023-01-19
Attending: INTERNAL MEDICINE
Payer: COMMERCIAL

## 2023-01-19 VITALS
TEMPERATURE: 98 F | RESPIRATION RATE: 17 BRPM | BODY MASS INDEX: 35.99 KG/M2 | WEIGHT: 216 LBS | SYSTOLIC BLOOD PRESSURE: 99 MMHG | DIASTOLIC BLOOD PRESSURE: 59 MMHG | HEIGHT: 65 IN | OXYGEN SATURATION: 100 % | HEART RATE: 62 BPM

## 2023-01-19 VITALS
HEART RATE: 86 BPM | BODY MASS INDEX: 34.93 KG/M2 | SYSTOLIC BLOOD PRESSURE: 104 MMHG | WEIGHT: 216.38 LBS | DIASTOLIC BLOOD PRESSURE: 58 MMHG | TEMPERATURE: 98 F

## 2023-01-19 DIAGNOSIS — D50.0 IRON DEFICIENCY ANEMIA DUE TO CHRONIC BLOOD LOSS: Primary | ICD-10-CM

## 2023-01-19 DIAGNOSIS — I26.99 BILATERAL PULMONARY EMBOLISM: ICD-10-CM

## 2023-01-19 PROCEDURE — 99213 PR OFFICE/OUTPT VISIT, EST, LEVL III, 20-29 MIN: ICD-10-PCS | Mod: S$GLB,,, | Performed by: INTERNAL MEDICINE

## 2023-01-19 PROCEDURE — 3078F DIAST BP <80 MM HG: CPT | Mod: CPTII,S$GLB,, | Performed by: INTERNAL MEDICINE

## 2023-01-19 PROCEDURE — 3008F BODY MASS INDEX DOCD: CPT | Mod: CPTII,S$GLB,, | Performed by: INTERNAL MEDICINE

## 2023-01-19 PROCEDURE — 3008F PR BODY MASS INDEX (BMI) DOCUMENTED: ICD-10-PCS | Mod: CPTII,S$GLB,, | Performed by: INTERNAL MEDICINE

## 2023-01-19 PROCEDURE — 3074F SYST BP LT 130 MM HG: CPT | Mod: CPTII,S$GLB,, | Performed by: INTERNAL MEDICINE

## 2023-01-19 PROCEDURE — 96365 THER/PROPH/DIAG IV INF INIT: CPT

## 2023-01-19 PROCEDURE — 99213 OFFICE O/P EST LOW 20 MIN: CPT | Mod: S$GLB,,, | Performed by: INTERNAL MEDICINE

## 2023-01-19 PROCEDURE — 3074F PR MOST RECENT SYSTOLIC BLOOD PRESSURE < 130 MM HG: ICD-10-PCS | Mod: CPTII,S$GLB,, | Performed by: INTERNAL MEDICINE

## 2023-01-19 PROCEDURE — 63600175 PHARM REV CODE 636 W HCPCS: Performed by: INTERNAL MEDICINE

## 2023-01-19 PROCEDURE — 1159F PR MEDICATION LIST DOCUMENTED IN MEDICAL RECORD: ICD-10-PCS | Mod: CPTII,S$GLB,, | Performed by: INTERNAL MEDICINE

## 2023-01-19 PROCEDURE — 1159F MED LIST DOCD IN RCRD: CPT | Mod: CPTII,S$GLB,, | Performed by: INTERNAL MEDICINE

## 2023-01-19 PROCEDURE — 25000003 PHARM REV CODE 250: Performed by: INTERNAL MEDICINE

## 2023-01-19 PROCEDURE — 3078F PR MOST RECENT DIASTOLIC BLOOD PRESSURE < 80 MM HG: ICD-10-PCS | Mod: CPTII,S$GLB,, | Performed by: INTERNAL MEDICINE

## 2023-01-19 RX ORDER — HEPARIN 100 UNIT/ML
500 SYRINGE INTRAVENOUS
Status: CANCELLED | OUTPATIENT
Start: 2023-01-26

## 2023-01-19 RX ORDER — SODIUM CHLORIDE 0.9 % (FLUSH) 0.9 %
10 SYRINGE (ML) INJECTION
Status: CANCELLED | OUTPATIENT
Start: 2023-01-26

## 2023-01-19 RX ADMIN — SODIUM CHLORIDE: 0.9 INJECTION, SOLUTION INTRAVENOUS at 12:01

## 2023-01-19 RX ADMIN — IRON SUCROSE 100 MG: 20 INJECTION, SOLUTION INTRAVENOUS at 12:01

## 2023-01-19 NOTE — PLAN OF CARE
Problem: Anemia  Goal: Anemia Symptom Improvement  Outcome: Met     Problem: Activity Intolerance  Goal: Enhanced Capacity and Energy  Outcome: Met

## 2023-01-19 NOTE — ASSESSMENT & PLAN NOTE
Patient has not yet started the IV iron and is in need of choly.  Will check CBC weekly and when her Hb is 10g/dl then I feel she would be ok to proceed.

## 2023-01-19 NOTE — PROGRESS NOTES
PROGRESS NOTE    Subjective:       Patient ID: Ricardo Rivera is a 22 y.o. female.    10/25/2021:  CTA chest:  Extensive bilateral PE   Thought due to OCPs  Eliquis given for 4 months.     PLAN as of 1/19/2022: IV iron weekly x 8, weekly CBC and will need choly when above 10    Chief Complaint:  No chief complaint on file.  Iron def anemia and history of PE    History of Present Illness:   Ricardo Rivera is a 22 y.o. female who presents for follow of of above.      Was due to start IV iron today, 1/19/2022.      No new complaints.       Patient also diagnosed with extensive PE October 2021.  This was the only clotting problem she has had and this was attributed to use of birth control at that time. Tooke Eliquis for 4 months.      PMH:  Ms. Rivera is a 23yo female who was diagnosed with iron deficiency anemia at 15 years of age.  She was seeing pediatric oncology at [facility] since 15 years old.  This was attributed to heavy menses and she was placed on birth control.  She unfortunately diagnosed with pulmonary embolism in 2021 and this was discontinued.   She has historically been treated with iron supplementation with oral iron but this makes her very constipated so she has discontinued this.  She has been treated with IV iron on 3 different occasions and has not had any allergic issues with this.         Family and Social history reviewed and is unchanged from 12/23/2022      ROS:  Review of Systems   Constitutional:  Negative for fever.   Respiratory:  Negative for shortness of breath.    Cardiovascular:  Negative for chest pain and leg swelling.   Gastrointestinal:  Negative for abdominal pain and blood in stool.   Genitourinary:  Negative for hematuria.   Skin:  Negative for rash.        Current Outpatient Medications:     EScitalopram oxalate (LEXAPRO) 10 MG tablet, Take 10 mg by mouth every morning., Disp: , Rfl:     ferrous  gluconate (FERGON) 240 (27 FE) MG tablet, Take 2 tablets (480 mg total) by mouth 2 (two) times daily. (Patient not taking: Reported on 1/19/2023), Disp: 60 tablet, Rfl: 5        Objective:       Physical Examination:     BP (!) 104/58   Pulse 86   Temp 98.2 °F (36.8 °C)   Wt 98.2 kg (216 lb 6.4 oz)   BMI 34.93 kg/m²     Physical Exam  Constitutional:       Appearance: She is well-developed.   HENT:      Head: Normocephalic and atraumatic.      Right Ear: External ear normal.      Left Ear: External ear normal.   Eyes:      Conjunctiva/sclera: Conjunctivae normal.      Pupils: Pupils are equal, round, and reactive to light.   Neck:      Thyroid: No thyromegaly.      Trachea: No tracheal deviation.   Cardiovascular:      Rate and Rhythm: Normal rate and regular rhythm.      Heart sounds: Normal heart sounds.   Pulmonary:      Effort: Pulmonary effort is normal.      Breath sounds: Normal breath sounds.   Abdominal:      General: Bowel sounds are normal. There is no distension.      Palpations: Abdomen is soft. There is no mass.      Tenderness: There is no abdominal tenderness.   Skin:     Findings: No rash.   Neurological:      Comments: Neuro intact througout   Psychiatric:         Behavior: Behavior normal.         Thought Content: Thought content normal.         Judgment: Judgment normal.       Labs:   Recent Results (from the past 336 hour(s))   CBC Auto Differential    Collection Time: 01/13/23 10:32 AM   Result Value Ref Range    WBC 7.11 3.90 - 12.70 K/uL    Hemoglobin 7.6 (L) 12.0 - 16.0 g/dL    Hematocrit 29.2 (L) 37.0 - 48.5 %    Platelets 360 150 - 450 K/uL     CMP  Sodium   Date Value Ref Range Status   12/04/2022 140 136 - 145 mmol/L Final     Potassium   Date Value Ref Range Status   12/04/2022 3.7 3.5 - 5.1 mmol/L Final     Chloride   Date Value Ref Range Status   12/04/2022 109 95 - 110 mmol/L Final     CO2   Date Value Ref Range Status   12/04/2022 22 (L) 23 - 29 mmol/L Final     Glucose   Date  Value Ref Range Status   12/04/2022 100 70 - 110 mg/dL Final     BUN   Date Value Ref Range Status   12/04/2022 10 6 - 20 mg/dL Final     Creatinine   Date Value Ref Range Status   12/04/2022 0.7 0.5 - 1.4 mg/dL Final     Calcium   Date Value Ref Range Status   12/04/2022 9.4 8.7 - 10.5 mg/dL Final     Total Protein   Date Value Ref Range Status   12/04/2022 7.3 6.0 - 8.4 g/dL Final     Albumin   Date Value Ref Range Status   12/04/2022 3.9 3.5 - 5.2 g/dL Final     Total Bilirubin   Date Value Ref Range Status   12/04/2022 0.8 0.1 - 1.0 mg/dL Final     Comment:     For infants and newborns, interpretation of results should be based  on gestational age, weight and in agreement with clinical  observations.    Premature Infant recommended reference ranges:  Up to 24 hours.............<8.0 mg/dL  Up to 48 hours............<12.0 mg/dL  3-5 days..................<15.0 mg/dL  6-29 days.................<15.0 mg/dL       Alkaline Phosphatase   Date Value Ref Range Status   12/04/2022 95 55 - 135 U/L Final     AST   Date Value Ref Range Status   12/04/2022 168 (H) 10 - 40 U/L Final     ALT   Date Value Ref Range Status   12/04/2022 81 (H) 10 - 44 U/L Final     Anion Gap   Date Value Ref Range Status   12/04/2022 9 8 - 16 mmol/L Final     eGFR if    Date Value Ref Range Status   10/27/2021 >60 >60 mL/min/1.73 m^2 Final     eGFR if non    Date Value Ref Range Status   10/27/2021 >60 >60 mL/min/1.73 m^2 Final     Comment:     Calculation used to obtain the estimated glomerular filtration  rate (eGFR) is the CKD-EPI equation.        No results found for: CEA  No results found for: PSA        Assessment/Plan:     Problem List Items Addressed This Visit       Iron deficiency anemia due to chronic blood loss - Primary     Patient has not yet started the IV iron and is in need of choly.  Will check CBC weekly and when her Hb is 10g/dl then I feel she would be ok to proceed.           Relevant Orders     Ferritin    CBC Auto Differential    Bilateral pulmonary embolism     This was thought due to OCPs.  Not on AC now.  Will continue to monitor.           Relevant Orders    CBC Auto Differential       Discussion:     No follow-ups on file.      Electronically signed by Frandy Flores

## 2023-01-20 ENCOUNTER — OFFICE VISIT (OUTPATIENT)
Dept: FAMILY MEDICINE | Facility: CLINIC | Age: 23
End: 2023-01-20
Payer: COMMERCIAL

## 2023-01-20 VITALS
BODY MASS INDEX: 35.65 KG/M2 | OXYGEN SATURATION: 100 % | RESPIRATION RATE: 18 BRPM | HEART RATE: 99 BPM | HEIGHT: 65 IN | SYSTOLIC BLOOD PRESSURE: 80 MMHG | DIASTOLIC BLOOD PRESSURE: 50 MMHG | TEMPERATURE: 99 F | WEIGHT: 214 LBS

## 2023-01-20 DIAGNOSIS — I26.99 BILATERAL PULMONARY EMBOLISM: Primary | ICD-10-CM

## 2023-01-20 DIAGNOSIS — K80.20 GALLSTONES: ICD-10-CM

## 2023-01-20 DIAGNOSIS — D50.8 OTHER IRON DEFICIENCY ANEMIA: ICD-10-CM

## 2023-01-20 DIAGNOSIS — N92.1 MENORRHAGIA WITH IRREGULAR CYCLE: ICD-10-CM

## 2023-01-20 PROCEDURE — 3074F PR MOST RECENT SYSTOLIC BLOOD PRESSURE < 130 MM HG: ICD-10-PCS | Mod: CPTII,S$GLB,, | Performed by: FAMILY MEDICINE

## 2023-01-20 PROCEDURE — 1159F MED LIST DOCD IN RCRD: CPT | Mod: CPTII,S$GLB,, | Performed by: FAMILY MEDICINE

## 2023-01-20 PROCEDURE — 3008F BODY MASS INDEX DOCD: CPT | Mod: CPTII,S$GLB,, | Performed by: FAMILY MEDICINE

## 2023-01-20 PROCEDURE — 3078F DIAST BP <80 MM HG: CPT | Mod: CPTII,S$GLB,, | Performed by: FAMILY MEDICINE

## 2023-01-20 PROCEDURE — 3008F PR BODY MASS INDEX (BMI) DOCUMENTED: ICD-10-PCS | Mod: CPTII,S$GLB,, | Performed by: FAMILY MEDICINE

## 2023-01-20 PROCEDURE — 1159F PR MEDICATION LIST DOCUMENTED IN MEDICAL RECORD: ICD-10-PCS | Mod: CPTII,S$GLB,, | Performed by: FAMILY MEDICINE

## 2023-01-20 PROCEDURE — 99213 PR OFFICE/OUTPT VISIT, EST, LEVL III, 20-29 MIN: ICD-10-PCS | Mod: S$GLB,,, | Performed by: FAMILY MEDICINE

## 2023-01-20 PROCEDURE — 3074F SYST BP LT 130 MM HG: CPT | Mod: CPTII,S$GLB,, | Performed by: FAMILY MEDICINE

## 2023-01-20 PROCEDURE — 3078F PR MOST RECENT DIASTOLIC BLOOD PRESSURE < 80 MM HG: ICD-10-PCS | Mod: CPTII,S$GLB,, | Performed by: FAMILY MEDICINE

## 2023-01-20 PROCEDURE — 99213 OFFICE O/P EST LOW 20 MIN: CPT | Mod: S$GLB,,, | Performed by: FAMILY MEDICINE

## 2023-01-20 NOTE — PROGRESS NOTES
Subjective:       Patient ID: Ricardo Rivera is a 22 y.o. female.    Chief Complaint: Follow-up (1 mth surgery clearance)      Here as a follow-up on gallstones.  Patient has been to the ER 5 times because of recurrent episodes of gallbladder pain she has altered her diet so has having less symptoms but still needs to be scheduled for surgery.  Dr. Trav Abrams she is getting iron infusions and needs follow-up on her iron deficiency  Lab Results       Component                Value               Date                       WBC                      7.11                01/13/2023                 HGB                      7.6 (L)             01/13/2023                 HCT                      29.2 (L)            01/13/2023                 PLT                      360                 01/13/2023                 CHOL                     170                 11/02/2021                 TRIG                     107                 11/02/2021                 HDL                      41 (L)              11/02/2021                 ALT                      81 (H)              12/04/2022                 AST                      168 (H)             12/04/2022                 NA                       140                 12/04/2022                 K                        3.7                 12/04/2022                 CL                       109                 12/04/2022                 CREATININE               0.7                 12/04/2022                 BUN                      10                  12/04/2022                 CO2                      22 (L)              12/04/2022                 TSH                      1.923               12/10/2020                 INR                      1.0                 10/26/2021                 GLUF                     89                  05/25/2021            Patient is still having heavy periods and is scheduled to see her gyn on January 26th she cannot intervene with  hormonal blockage of her menses because of history of PEs bilaterally.  Has seen Dr. Galeana and recommended possibly blood thinners with low-dose birth control but she is already profoundly anemic.        Allergies and Medications:   Review of patient's allergies indicates:  No Known Allergies  Current Outpatient Medications   Medication Sig Dispense Refill    EScitalopram oxalate (LEXAPRO) 10 MG tablet Take 10 mg by mouth every morning.      ferrous gluconate (FERGON) 240 (27 FE) MG tablet Take 2 tablets (480 mg total) by mouth 2 (two) times daily. (Patient not taking: Reported on 1/19/2023) 60 tablet 5     No current facility-administered medications for this visit.       Family History:   Family History   Problem Relation Age of Onset    No Known Problems Mother     No Known Problems Father     Breast cancer Neg Hx     Ovarian cancer Neg Hx        Social History:   Social History     Socioeconomic History    Marital status: Single   Tobacco Use    Smoking status: Every Day     Types: Vaping with nicotine    Smokeless tobacco: Never   Substance and Sexual Activity    Alcohol use: No    Drug use: No    Sexual activity: Yes     Partners: Male       Review of Systems    Objective:     Vitals:    01/20/23 1019   BP: (!) 80/50   Pulse: 99   Resp: 18   Temp: 99 °F (37.2 °C)        Physical Exam  Vitals and nursing note reviewed.   Constitutional:       General: She is not in acute distress.     Appearance: Normal appearance. She is well-developed and normal weight. She is not ill-appearing, toxic-appearing or diaphoretic.   HENT:      Head: Normocephalic and atraumatic.   Eyes:      Pupils: Pupils are equal, round, and reactive to light.   Cardiovascular:      Rate and Rhythm: Normal rate and regular rhythm.      Heart sounds: Normal heart sounds. No murmur heard.    No friction rub. No gallop.   Pulmonary:      Effort: Pulmonary effort is normal. No respiratory distress.      Breath sounds: Normal breath sounds. No  stridor. No wheezing, rhonchi or rales.   Chest:      Chest wall: No tenderness.   Musculoskeletal:      Right lower leg: No edema.      Left lower leg: No edema.   Neurological:      Mental Status: She is alert.   Psychiatric:         Behavior: Behavior normal.         Thought Content: Thought content normal.         Judgment: Judgment normal.       Assessment:       No diagnosis found.    Plan:       There are no diagnoses linked to this encounter.     No follow-ups on file.

## 2023-01-23 ENCOUNTER — PATIENT MESSAGE (OUTPATIENT)
Dept: SURGERY | Facility: CLINIC | Age: 23
End: 2023-01-23
Payer: COMMERCIAL

## 2023-01-23 ENCOUNTER — OFFICE VISIT (OUTPATIENT)
Dept: OBSTETRICS AND GYNECOLOGY | Facility: CLINIC | Age: 23
End: 2023-01-23
Payer: COMMERCIAL

## 2023-01-23 ENCOUNTER — LAB VISIT (OUTPATIENT)
Dept: LAB | Facility: HOSPITAL | Age: 23
End: 2023-01-23
Attending: OBSTETRICS & GYNECOLOGY
Payer: COMMERCIAL

## 2023-01-23 VITALS
DIASTOLIC BLOOD PRESSURE: 68 MMHG | WEIGHT: 215.81 LBS | BODY MASS INDEX: 35.96 KG/M2 | HEIGHT: 65 IN | SYSTOLIC BLOOD PRESSURE: 127 MMHG

## 2023-01-23 DIAGNOSIS — Z01.419 WELL WOMAN EXAM: Primary | ICD-10-CM

## 2023-01-23 DIAGNOSIS — N92.4 MENORRHAGIA, PREMENOPAUSAL: ICD-10-CM

## 2023-01-23 DIAGNOSIS — N92.0 MENORRHAGIA WITH REGULAR CYCLE: ICD-10-CM

## 2023-01-23 DIAGNOSIS — D50.0 IRON DEFICIENCY ANEMIA DUE TO CHRONIC BLOOD LOSS: ICD-10-CM

## 2023-01-23 DIAGNOSIS — I26.99 BILATERAL PULMONARY EMBOLISM: ICD-10-CM

## 2023-01-23 PROCEDURE — 83525 ASSAY OF INSULIN: CPT | Performed by: SPECIALIST

## 2023-01-23 PROCEDURE — 84402 ASSAY OF FREE TESTOSTERONE: CPT | Performed by: SPECIALIST

## 2023-01-23 PROCEDURE — 99213 OFFICE O/P EST LOW 20 MIN: CPT | Mod: S$GLB,,, | Performed by: SPECIALIST

## 2023-01-23 PROCEDURE — 3008F PR BODY MASS INDEX (BMI) DOCUMENTED: ICD-10-PCS | Mod: CPTII,S$GLB,, | Performed by: SPECIALIST

## 2023-01-23 PROCEDURE — 3008F BODY MASS INDEX DOCD: CPT | Mod: CPTII,S$GLB,, | Performed by: SPECIALIST

## 2023-01-23 PROCEDURE — 82728 ASSAY OF FERRITIN: CPT | Performed by: INTERNAL MEDICINE

## 2023-01-23 PROCEDURE — 3074F SYST BP LT 130 MM HG: CPT | Mod: CPTII,S$GLB,, | Performed by: SPECIALIST

## 2023-01-23 PROCEDURE — 99213 PR OFFICE/OUTPT VISIT, EST, LEVL III, 20-29 MIN: ICD-10-PCS | Mod: S$GLB,,, | Performed by: SPECIALIST

## 2023-01-23 PROCEDURE — 3078F DIAST BP <80 MM HG: CPT | Mod: CPTII,S$GLB,, | Performed by: SPECIALIST

## 2023-01-23 PROCEDURE — 83001 ASSAY OF GONADOTROPIN (FSH): CPT | Performed by: SPECIALIST

## 2023-01-23 PROCEDURE — 84146 ASSAY OF PROLACTIN: CPT | Performed by: SPECIALIST

## 2023-01-23 PROCEDURE — 1159F PR MEDICATION LIST DOCUMENTED IN MEDICAL RECORD: ICD-10-PCS | Mod: CPTII,S$GLB,, | Performed by: SPECIALIST

## 2023-01-23 PROCEDURE — 84403 ASSAY OF TOTAL TESTOSTERONE: CPT | Performed by: SPECIALIST

## 2023-01-23 PROCEDURE — 85025 COMPLETE CBC W/AUTO DIFF WBC: CPT | Performed by: INTERNAL MEDICINE

## 2023-01-23 PROCEDURE — 99999 PR PBB SHADOW E&M-EST. PATIENT-LVL III: ICD-10-PCS | Mod: PBBFAC,,, | Performed by: SPECIALIST

## 2023-01-23 PROCEDURE — 3078F PR MOST RECENT DIASTOLIC BLOOD PRESSURE < 80 MM HG: ICD-10-PCS | Mod: CPTII,S$GLB,, | Performed by: SPECIALIST

## 2023-01-23 PROCEDURE — 1159F MED LIST DOCD IN RCRD: CPT | Mod: CPTII,S$GLB,, | Performed by: SPECIALIST

## 2023-01-23 PROCEDURE — 3074F PR MOST RECENT SYSTOLIC BLOOD PRESSURE < 130 MM HG: ICD-10-PCS | Mod: CPTII,S$GLB,, | Performed by: SPECIALIST

## 2023-01-23 PROCEDURE — 82627 DEHYDROEPIANDROSTERONE: CPT | Performed by: SPECIALIST

## 2023-01-23 PROCEDURE — 83002 ASSAY OF GONADOTROPIN (LH): CPT | Performed by: SPECIALIST

## 2023-01-23 PROCEDURE — 99999 PR PBB SHADOW E&M-EST. PATIENT-LVL III: CPT | Mod: PBBFAC,,, | Performed by: SPECIALIST

## 2023-01-23 PROCEDURE — 82947 ASSAY GLUCOSE BLOOD QUANT: CPT | Performed by: SPECIALIST

## 2023-01-23 PROCEDURE — 84443 ASSAY THYROID STIM HORMONE: CPT | Performed by: SPECIALIST

## 2023-01-23 PROCEDURE — 86256 FLUORESCENT ANTIBODY TITER: CPT | Performed by: SPECIALIST

## 2023-01-23 NOTE — PROGRESS NOTES
23 yo WF  history secondary anemia  menorrhagia since delivery States menses monthly with heavy flow  Estefani with history thrombosis on OCP and discontinued use.  Chart reviw reveals negative thrombosis workup, neg Factor 5  Past Medical History:   Diagnosis Date    Iron deficiency anemia 2018       Past Surgical History:   Procedure Laterality Date     SECTION N/A 2021    Procedure:  SECTION;  Surgeon: Rafael Rodriguez MD;  Location: [facility] L&D;  Service: OB/GYN;  Laterality: N/A;       Family History   Problem Relation Age of Onset    No Known Problems Mother     No Known Problems Father     Breast cancer Neg Hx     Ovarian cancer Neg Hx        Social History     Socioeconomic History    Marital status: Single   Tobacco Use    Smoking status: Every Day     Types: Vaping with nicotine    Smokeless tobacco: Never   Substance and Sexual Activity    Alcohol use: No    Drug use: No    Sexual activity: Yes     Partners: Male       Current Outpatient Medications   Medication Sig Dispense Refill    EScitalopram oxalate (LEXAPRO) 10 MG tablet Take 10 mg by mouth every morning.      ferrous gluconate (FERGON) 240 (27 FE) MG tablet Take 2 tablets (480 mg total) by mouth 2 (two) times daily. (Patient not taking: Reported on 2023) 60 tablet 5     No current facility-administered medications for this visit.       Review of patient's allergies indicates:  No Known Allergies    Review of System:   General: no chills, fever, night sweats, weight gain or weight loss  Psychological: no depression or suicidal ideation  Breasts: no new or changing breast lumps, nipple discharge or masses.  Respiratory: no cough, shortness of breath, or wheezing  Cardiovascular: no chest pain or dyspnea on exertion  Gastrointestinal: no abdominal pain, change in bowel habits, or black or bloody stools  Genito-Urinary: no incontinence, urinary frequency/urgency or vulvar/vaginal symptoms, pelvic pain or abnormal vaginal  bleeding. POSITIVE MENORRHAGIA  Musculoskeletal: no gait disturbance or muscular weakness                                               General Appearance    A and O x 4, Cooperative, no distress   Breasts    Abdomen   Deferred    Soft, non-tender, bowel sounds active all four quadrants,  no masses, no organomegaly    Genitourinary:   External rectal exam shows no thrombosed external hemorrhoids.   Pelvic exam was performed with patient supine.  No labial fusion.  There is no rash, lesion or injury on the right labia.  There is no rash, lesion or injury on the left labia.  No bleeding and no signs of injury around the vaginal introitus, urethra is without lesions and well supported. The cervix is visualized with no discharge, lesions or friability.  No vaginal discharge found.  No significant Cystocele, Enterocele or rectocele, and uterus well supported.  Bimanual exam:  The urethra is normal to palpation and there are no palpable vaginal wall masses.  Uterus is not deviated, not enlarged, not fixed, normal shape and not tender.  Cervix exhibits no motion tenderness.   Right adnexum displays no mass and no tenderness.  Left adnexum displays no mass and no tenderness.   Extremities: Extremities normal, atraumatic, no cyanosis or edema                     NOTE  NURSING PERSONAL PRESENT FOR ENTIRE PHYSICAL EXAM     I discussed etiologies of menorrhagia and will obtain pelvic u/s as well as PCOS profile  Discussed possible tx options and will have pt RTO 2 weeks for follow up  Answered all questions

## 2023-01-24 ENCOUNTER — HOSPITAL ENCOUNTER (OUTPATIENT)
Dept: RADIOLOGY | Facility: HOSPITAL | Age: 23
Discharge: HOME | End: 2023-01-24
Attending: SPECIALIST
Payer: COMMERCIAL

## 2023-01-24 ENCOUNTER — PATIENT MESSAGE (OUTPATIENT)
Dept: SURGERY | Facility: CLINIC | Age: 23
End: 2023-01-24
Payer: COMMERCIAL

## 2023-01-24 DIAGNOSIS — N92.0 MENORRHAGIA WITH REGULAR CYCLE: ICD-10-CM

## 2023-01-24 DIAGNOSIS — K80.20 CHOLELITHIASIS: ICD-10-CM

## 2023-01-24 DIAGNOSIS — K80.20 CALCULUS OF GALLBLADDER WITHOUT CHOLECYSTITIS WITHOUT OBSTRUCTION: Primary | ICD-10-CM

## 2023-01-24 LAB
BASOPHILS # BLD AUTO: 0.08 K/UL (ref 0–0.2)
BASOPHILS NFR BLD: 0.9 % (ref 0–1.9)
DHEA-S SERPL-MCNC: 116.6 UG/DL (ref 134.2–407.4)
DIFFERENTIAL METHOD: ABNORMAL
EOSINOPHIL # BLD AUTO: 0.1 K/UL (ref 0–0.5)
EOSINOPHIL NFR BLD: 1.6 % (ref 0–8)
ERYTHROCYTE [DISTWIDTH] IN BLOOD BY AUTOMATED COUNT: 22.4 % (ref 11.5–14.5)
FERRITIN SERPL-MCNC: 25 NG/ML (ref 20–300)
FSH SERPL-ACNC: 4.01 MIU/ML
GLUCOSE SERPL-MCNC: 81 MG/DL (ref 70–110)
HCT VFR BLD AUTO: 30.8 % (ref 37–48.5)
HGB BLD-MCNC: 7.9 G/DL (ref 12–16)
IMM GRANULOCYTES # BLD AUTO: 0.02 K/UL (ref 0–0.04)
IMM GRANULOCYTES NFR BLD AUTO: 0.2 % (ref 0–0.5)
INSULIN COLLECTION INTERVAL: 1
INSULIN SERPL-ACNC: 12.4 UU/ML
LH SERPL-ACNC: 2.3 MIU/ML
LYMPHOCYTES # BLD AUTO: 2.3 K/UL (ref 1–4.8)
LYMPHOCYTES NFR BLD: 25.5 % (ref 18–48)
MCH RBC QN AUTO: 16.8 PG (ref 27–31)
MCHC RBC AUTO-ENTMCNC: 25.6 G/DL (ref 32–36)
MCV RBC AUTO: 66 FL (ref 82–98)
MONOCYTES # BLD AUTO: 0.6 K/UL (ref 0.3–1)
MONOCYTES NFR BLD: 6.4 % (ref 4–15)
NEUTROPHILS # BLD AUTO: 5.8 K/UL (ref 1.8–7.7)
NEUTROPHILS NFR BLD: 65.4 % (ref 38–73)
NRBC BLD-RTO: 0 /100 WBC
PLATELET # BLD AUTO: 367 K/UL (ref 150–450)
PMV BLD AUTO: ABNORMAL FL (ref 9.2–12.9)
PROLACTIN SERPL IA-MCNC: 10.5 NG/ML (ref 5.2–26.5)
RBC # BLD AUTO: 4.7 M/UL (ref 4–5.4)
TESTOST SERPL-MCNC: 18 NG/DL (ref 5–73)
TSH SERPL DL<=0.005 MIU/L-ACNC: 2.62 UIU/ML (ref 0.4–4)
WBC # BLD AUTO: 8.86 K/UL (ref 3.9–12.7)

## 2023-01-24 PROCEDURE — 76856 US EXAM PELVIC COMPLETE: CPT | Mod: TC,PN

## 2023-01-24 PROCEDURE — 76856 US PELVIS COMP WITH TRANSVAG NON-OB (XPD): ICD-10-PCS | Mod: 26,,, | Performed by: RADIOLOGY

## 2023-01-24 PROCEDURE — 76830 TRANSVAGINAL US NON-OB: CPT | Mod: 26,,, | Performed by: RADIOLOGY

## 2023-01-24 PROCEDURE — 76830 US PELVIS COMP WITH TRANSVAG NON-OB (XPD): ICD-10-PCS | Mod: 26,,, | Performed by: RADIOLOGY

## 2023-01-24 PROCEDURE — 76856 US EXAM PELVIC COMPLETE: CPT | Mod: 26,,, | Performed by: RADIOLOGY

## 2023-01-24 RX ORDER — SODIUM CHLORIDE 9 MG/ML
INJECTION, SOLUTION INTRAVENOUS CONTINUOUS
Status: CANCELLED | OUTPATIENT
Start: 2023-01-24

## 2023-01-26 ENCOUNTER — INFUSION (OUTPATIENT)
Dept: INFUSION THERAPY | Facility: HOSPITAL | Age: 23
End: 2023-01-26
Attending: INTERNAL MEDICINE
Payer: COMMERCIAL

## 2023-01-26 VITALS
OXYGEN SATURATION: 96 % | SYSTOLIC BLOOD PRESSURE: 101 MMHG | RESPIRATION RATE: 18 BRPM | BODY MASS INDEX: 35.46 KG/M2 | TEMPERATURE: 98 F | HEART RATE: 77 BPM | WEIGHT: 212.81 LBS | DIASTOLIC BLOOD PRESSURE: 59 MMHG | HEIGHT: 65 IN

## 2023-01-26 DIAGNOSIS — D50.0 IRON DEFICIENCY ANEMIA DUE TO CHRONIC BLOOD LOSS: Primary | ICD-10-CM

## 2023-01-26 PROCEDURE — 25000003 PHARM REV CODE 250: Performed by: INTERNAL MEDICINE

## 2023-01-26 PROCEDURE — 63600175 PHARM REV CODE 636 W HCPCS: Performed by: INTERNAL MEDICINE

## 2023-01-26 PROCEDURE — 96365 THER/PROPH/DIAG IV INF INIT: CPT

## 2023-01-26 RX ORDER — SODIUM CHLORIDE 0.9 % (FLUSH) 0.9 %
10 SYRINGE (ML) INJECTION
Status: CANCELLED | OUTPATIENT
Start: 2023-02-02

## 2023-01-26 RX ORDER — HEPARIN 100 UNIT/ML
500 SYRINGE INTRAVENOUS
Status: CANCELLED | OUTPATIENT
Start: 2023-02-02

## 2023-01-26 RX ORDER — SODIUM CHLORIDE 0.9 % (FLUSH) 0.9 %
10 SYRINGE (ML) INJECTION
Status: DISCONTINUED | OUTPATIENT
Start: 2023-01-26 | End: 2023-01-26 | Stop reason: HOSPADM

## 2023-01-26 RX ADMIN — SODIUM CHLORIDE: 0.9 INJECTION, SOLUTION INTRAVENOUS at 02:01

## 2023-01-26 RX ADMIN — IRON SUCROSE 100 MG: 20 INJECTION, SOLUTION INTRAVENOUS at 02:01

## 2023-01-26 NOTE — PLAN OF CARE
Problem: Anemia  Goal: Anemia Symptom Improvement  Outcome: Ongoing, Progressing  Intervention: Monitor and Manage Anemia  Flowsheets (Taken 1/26/2023 1407)  Oral Nutrition Promotion: safe use of adaptive equipment encouraged  Safety Promotion/Fall Prevention: instructed to call staff for mobility  Fatigue Management: frequent rest breaks encouraged

## 2023-01-27 ENCOUNTER — PATIENT MESSAGE (OUTPATIENT)
Dept: SURGERY | Facility: CLINIC | Age: 23
End: 2023-01-27
Payer: COMMERCIAL

## 2023-01-27 LAB — TESTOST FREE SERPL-MCNC: <0.4 PG/ML

## 2023-01-31 ENCOUNTER — HOSPITAL ENCOUNTER (OUTPATIENT)
Dept: PREADMISSION TESTING | Facility: HOSPITAL | Age: 23
Discharge: HOME | End: 2023-01-31
Attending: SURGERY
Payer: COMMERCIAL

## 2023-01-31 ENCOUNTER — ANESTHESIA EVENT (OUTPATIENT)
Dept: SURGERY | Facility: HOSPITAL | Age: 23
End: 2023-01-31
Payer: COMMERCIAL

## 2023-01-31 DIAGNOSIS — K80.20 CALCULUS OF GALLBLADDER WITHOUT CHOLECYSTITIS WITHOUT OBSTRUCTION: ICD-10-CM

## 2023-01-31 LAB
ALBUMIN SERPL BCP-MCNC: 4.1 G/DL (ref 3.5–5.2)
ALP SERPL-CCNC: 64 U/L (ref 55–135)
ALT SERPL W/O P-5'-P-CCNC: 18 U/L (ref 10–44)
ANION GAP SERPL CALC-SCNC: 8 MMOL/L (ref 8–16)
AST SERPL-CCNC: 17 U/L (ref 10–40)
BASOPHILS # BLD AUTO: 0.05 K/UL (ref 0–0.2)
BASOPHILS NFR BLD: 0.9 % (ref 0–1.9)
BILIRUB SERPL-MCNC: 0.8 MG/DL (ref 0.1–1)
BUN SERPL-MCNC: 8 MG/DL (ref 6–20)
CALCIUM SERPL-MCNC: 9.5 MG/DL (ref 8.7–10.5)
CHLORIDE SERPL-SCNC: 111 MMOL/L (ref 95–110)
CO2 SERPL-SCNC: 22 MMOL/L (ref 23–29)
CREAT SERPL-MCNC: 0.7 MG/DL (ref 0.5–1.4)
DIFFERENTIAL METHOD: ABNORMAL
EOSINOPHIL # BLD AUTO: 0.1 K/UL (ref 0–0.5)
EOSINOPHIL NFR BLD: 1.9 % (ref 0–8)
ERYTHROCYTE [DISTWIDTH] IN BLOOD BY AUTOMATED COUNT: 25.2 % (ref 11.5–14.5)
EST. GFR  (NO RACE VARIABLE): >60 ML/MIN/1.73 M^2
GLUCOSE SERPL-MCNC: 81 MG/DL (ref 70–110)
HCT VFR BLD AUTO: 31.6 % (ref 37–48.5)
HGB BLD-MCNC: 8.2 G/DL (ref 12–16)
IMM GRANULOCYTES # BLD AUTO: 0.01 K/UL (ref 0–0.04)
IMM GRANULOCYTES NFR BLD AUTO: 0.2 % (ref 0–0.5)
LYMPHOCYTES # BLD AUTO: 1.9 K/UL (ref 1–4.8)
LYMPHOCYTES NFR BLD: 33 % (ref 18–48)
MCH RBC QN AUTO: 17.8 PG (ref 27–31)
MCHC RBC AUTO-ENTMCNC: 25.9 G/DL (ref 32–36)
MCV RBC AUTO: 69 FL (ref 82–98)
MONOCYTES # BLD AUTO: 0.3 K/UL (ref 0.3–1)
MONOCYTES NFR BLD: 5.8 % (ref 4–15)
NEUTROPHILS # BLD AUTO: 3.3 K/UL (ref 1.8–7.7)
NEUTROPHILS NFR BLD: 58.2 % (ref 38–73)
NRBC BLD-RTO: 0 /100 WBC
PLATELET # BLD AUTO: 418 K/UL (ref 150–450)
PMV BLD AUTO: 10.5 FL (ref 9.2–12.9)
POTASSIUM SERPL-SCNC: 4.1 MMOL/L (ref 3.5–5.1)
PROT SERPL-MCNC: 7 G/DL (ref 6–8.4)
RBC # BLD AUTO: 4.61 M/UL (ref 4–5.4)
SODIUM SERPL-SCNC: 141 MMOL/L (ref 136–145)
WBC # BLD AUTO: 5.69 K/UL (ref 3.9–12.7)

## 2023-01-31 PROCEDURE — 36415 COLL VENOUS BLD VENIPUNCTURE: CPT | Performed by: SURGERY

## 2023-01-31 PROCEDURE — 85025 COMPLETE CBC W/AUTO DIFF WBC: CPT | Performed by: SURGERY

## 2023-01-31 PROCEDURE — 80053 COMPREHEN METABOLIC PANEL: CPT | Performed by: SURGERY

## 2023-01-31 PROCEDURE — 99900104 DSU ONLY-NO CHARGE-EA ADD'L HR (STAT)

## 2023-01-31 PROCEDURE — 99900103 DSU ONLY-NO CHARGE-INITIAL HR (STAT)

## 2023-01-31 NOTE — DISCHARGE INSTRUCTIONS
Activity: No heavy lifting till seen in office.      General Information:    1.  Do not drink alcoholic beverages including beer for 24 hours or as long as you are on pain medication..  2.  Do not drive a motor vehicle, operate machinery or power tools, or signs legal papers for 24 hours or as long as you are on pain medication.   3.  You may experience light-headedness, dizziness, and sleepiness following surgery. Please do not stay alone. A responsible adult should be with you for this 24 hour period.  4.  Go home and rest.    5. Progress slowly to a normal diet unless instructed.  Otherwise, begin with liquids such as soft drinks, then soup and crackers working up to solid foods. Drink plenty of nonalcoholic fluids.  6.  Certain anesthetics and pain medications produce nausea and vomiting in certain       individuals. If nausea becomes a problem at home, call you doctor.    7. A nurse will be calling you sometime after surgery. Do not be alarmed. This is our way of finding out how you are doing.    8. Several times every hour while you are awake, take 2-3 deep breaths and cough. If you had stomach surgery hold a pillow or rolled towel firmly against your stomach before you cough. This will help with any pain the cough might cause.  9. Several times every hour while you are awake, pump and flex your feet 5-6 times and do foot circles. This will help prevent blood clots.    10.Call your doctor for severe pain, bleeding, fever, or signs or symptoms of infection (pain, swelling, redness, foul odor, drainage).   Post op instructions for prevention of DVT  What is deep vein thrombosis?  Deep vein thrombosis (DVT) is the medical term for blood clots in the deep veins of the leg.  These blood clots can be dangerous.  A DVT can block a blood vessel and keep blood from getting where it needs to go.  Another problem is that the clot can travel to other parts of the body such as the lungs.  A clot that travels to the lungs  is called a pulmonary embolus (PE) and can cause serious problems with breathing which can lead to death.  Am I at risk for DVT/PE?  If you are not very active, you are at risk of DVT.  Anyone confined to bed, sitting for long periods of time, recovering from surgery, etc. increases the risk of DVT.  Other risk factors are cancer diagnosis, certain medications, estrogen replacement in any form,older age, obesity, pregnancy, smoking, history of clotting disorders, and dehydration.  How will I know if I have a DVT?  Swelling in the lower leg  Pain  Warmth, redness, hardness or bulging of the vein  If you have any of these symptoms, call your doctors office right away.  Some people will not have any symptoms until the clot moves to the lungs.  What are the symptoms of a PE?  Panting, shortness of breath, or trouble breathing  Sharp, knife-like chest pain when you breathe  Coughing or coughing up blood  Rapid heartbeat  If you have any of these symptoms or get worse quickly, call 911 for emergency treatment.  How can I prevent a DVT?  Avoid long periods of inactivity and dont cross your legs--get up and walk around every hour or so.  Stay active--walking after surgery is highly encouraged.  This means you should get out of the house and walk in the neighborhood.  Going up and down stairs will not impair healing (unless advised against such activity by your doctor).    Drink plenty of noncaffeinated, nonalcoholic fluids each day to prevent dehydration.  Wear special support stockings, if they have been advised by your doctor.  If you travel, stop at least once an hour and walk around.  Avoid smoking (assistance with stopping is available through your healthcare provider)  Always notify your doctor if you are not able to follow the post operative instructions that are given to you at the time of discharge.  It may be necessary to prescribe one of the medications available to prevent DVT. Using an Incentive  Spirometer    An incentive spirometer is a device that helps you do deep breathing exercises. These exercises expand your lungs, aid in circulation, and help prevent pneumonia. Deep breathing exercises also help you breathe better and improve the function of your lungs by:  Keeping your lungs clear  Strengthening your breathing muscles  Helping prevent respiratory complications or problems  The incentive spirometer gives you a way to take an active part in recover. A nurse or therapist will teach you breathing exercises. To do these exercises, you will breathe in through your mouth and not your nose. The incentive spirometer only works correctly if you breathe in through your mouth.  Steps to clear lungs  Step 1. Exhale normally. Then, inhale normally.  Relax and breathe out.  Step 2. Place your lips tightly around the mouthpiece.  Make sure the device is upright and not tilted.  Step 3. Inhale as much air as you can through the mouthpiece (don't breath through your nose).  Inhale slowly and deeply.  Hold your breath long enough to keep the balls or disk raised for at least 3 to 5 seconds, or as instructed by your healthcare provider.  Some spirometers have an indicator to let you know that you are breathing in too fast. If the indicator goes off, breathe in more slowly.  Step 4. Repeat the exercise regularly.  Do this exercise every hour while you're awake, or as instructed by your healthcare provider.  If you were taught deep breathing and coughing exercises, do them regularly as instructed by your healthcare provider.      Discharge Instructions: After Your Surgery/Procedure  Youve just had surgery. During surgery you were given medicine called anesthesia to keep you relaxed and free of pain. After surgery you may have some pain or nausea. This is common. Here are some tips for feeling better and getting well after surgery.     Stay on schedule with your medication.   Going home  Your doctor or nurse will show  "you how to take care of yourself when you go home. He or she will also answer your questions. Have an adult family member or friend drive you home.      For your safety we recommend these precaution for the first 24 hours after your procedure:  Do not drive or use heavy equipment.  Do not make important decisions or sign legal papers.  Do not drink alcohol.  Have someone stay with you, if needed. He or she can watch for problems and help keep you safe.  Your concentration, balance, coordination, and judgement may be impaired for many hours after anesthesia.  Use caution when ambulating or standing up.     You may feel weak and "washed out" after anesthesia and surgery.      Subtle residual effects of general anesthesia or sedation with regional / local anesthesia can last more than 24 hours.  Rest for the remainder of the day or longer if your Doctor/Surgeon has advised you to do so.  Although you may feel normal within the first 24 hours, your reflexes and mental ability may be impaired without you realizing it.  You may feel dizzy, lightheaded or sleepy for 24 hours or longer.      Be sure to go to all follow-up visits with your doctor. And rest after your surgery for as long as your doctor tells you to.  Coping with pain  If you have pain after surgery, pain medicine will help you feel better. Take it as told, before pain becomes severe. Also, ask your doctor or pharmacist about other ways to control pain. This might be with heat, ice, or relaxation. And follow any other instructions your surgeon or nurse gives you.  Tips for taking pain medicine  To get the best relief possible, remember these points:  Pain medicines can upset your stomach. Taking them with a little food may help.  Most pain relievers taken by mouth need at least 20 to 30 minutes to start to work.  Taking medicine on a schedule can help you remember to take it. Try to time your medicine so that you can take it before starting an activity. This " might be before you get dressed, go for a walk, or sit down for dinner.  Constipation is a common side effect of pain medicines. Call your doctor before taking any medicines such as laxatives or stool softeners to help ease constipation. Also ask if you should skip any foods. Drinking lots of fluids and eating foods such as fruits and vegetables that are high in fiber can also help. Remember, do not take laxatives unless your surgeon has prescribed them.  Drinking alcohol and taking pain medicine can cause dizziness and slow your breathing. It can even be deadly. Do not drink alcohol while taking pain medicine.  Pain medicine can make you react more slowly to things. Do not drive or run machinery while taking pain medicine.  Your health care provider may tell you to take acetaminophen to help ease your pain. Ask him or her how much you are supposed to take each day. Acetaminophen or other pain relievers may interact with your prescription medicines or other over-the-counter (OTC) drugs. Some prescription medicines have acetaminophen and other ingredients. Using both prescription and OTC acetaminophen for pain can cause you to overdose. Read the labels on your OTC medicines with care. This will help you to clearly know the list of ingredients, how much to take, and any warnings. It may also help you not take too much acetaminophen. If you have questions or do not understand the information, ask your pharmacist or health care provider to explain it to you before you take the OTC medicine.  Managing nausea  Some people have an upset stomach after surgery. This is often because of anesthesia, pain, or pain medicine, or the stress of surgery. These tips will help you handle nausea and eat healthy foods as you get better. If you were on a special food plan before surgery, ask your doctor if you should follow it while you get better. These tips may help:  Do not push yourself to eat. Your body will tell you when to eat and  how much.  Start off with clear liquids and soup. They are easier to digest.  Next try semi-solid foods, such as mashed potatoes, applesauce, and gelatin, as you feel ready.  Slowly move to solid foods. Dont eat fatty, rich, or spicy foods at first.  Do not force yourself to have 3 large meals a day. Instead eat smaller amounts more often.  Take pain medicines with a small amount of solid food, such as crackers or toast, to avoid nausea.     Call your surgeon if  You still have pain an hour after taking medicine. The medicine may not be strong enough.  You feel too sleepy, dizzy, or groggy. The medicine may be too strong.  You have side effects like nausea, vomiting, or skin changes, such as rash, itching, or hives.       If you have obstructive sleep apnea  You were given anesthesia medicine during surgery to keep you comfortable and free of pain. After surgery, you may have more apnea spells because of this medicine and other medicines you were given. The spells may last longer than usual.   At home:  Keep using the continuous positive airway pressure (CPAP) device when you sleep. Unless your health care provider tells you not to, use it when you sleep, day or night. CPAP is a common device used to treat obstructive sleep apnea.  Talk with your provider before taking any pain medicine, muscle relaxants, or sedatives. Your provider will tell you about the possible dangers of taking these medicines.  © 0894-6564 [employer]. [address] [place] [address]  8806. All rights reserved. This information is not intended as a substitute for professional medical care. Always follow your healthcare professional's instructions.     Using Opioids for Pain Management     Your doctor has given instructions for you to take an opioid.  This is a drug for bad pain.  It helps control pain without causing bleeding and kidney problems.  Common opioid names are morphine, hydromorphone, oxycodone, and methadone.  These drugs are called narcotics.    There are several safety concerns you need to know.     It is against the law to give or sell this drug to another person.  You must keep this medicine safely locked.    You may have side effects from taking this medication.  These include nausea, itching, sweating, sleepiness, a change in your ability to breathe, and depression.  Do not take alcohol or sleeping pills opioids.    Long-term opoid use may no longer giver you relief from pain.  It can cause you stomach pain, mental anxiety, and headaches.  Long-term opoid use can potentially lead to unlawful street drug abuse and reduce your ability to stay employed.    Your body may become opioid tolerant if you need to take more to get relief.    You must stop taking opioids if you begin having more pain as a result of the medicine.    Opioid withdrawal occurs when you have to stop taking the drug.  It can cause you to have nausea, vomiting, diarrhea, stomach pain, anxiety, and dilated pupils in your eyes. This condition means you are opioid dependent.    Addiction is a drug induced brain disease. It means there are changes in how your brain is working.  Children, teens, and young adults under 25 years old are more likely to get addicted to opioids.      Addiction can happen with repeated opioid use.  It does not happen with short-term use of two weeks or less.       For more information, please speak with your doctor or pharmacist.         We hope your stay was comfortable as you heal now, mend and rest.    For we have enjoyed taking care of you by giving your our best.    And as you get better, by regaining your health and strength;   We count it as a privilege to have served you and hope your time at [facility] was well spent.      Thank  You!!!

## 2023-01-31 NOTE — DISCHARGE INSTRUCTIONS
To confirm, Your doctor has instructed you that surgery is scheduled for: 2/2/23    Please report to [facility], Registration the morning of surgery. You must check-in and receive a wristband before going to your procedure.    Pre-Op will call the afternoon prior to surgery between 1:00 and 6:00 PM with the final arrival time.  Phone number: [phone]    PLEASE NOTE:  The surgery schedule has many variables which may affect the time of your surgery case.  Family members should be available if your surgery time changes.  Plan to be here the day of your procedure between 4-6 hours.    MEDICATIONS:  TAKE ONLY THESE MEDICATIONS WITH A SMALL SIP OF WATER THE MORNING OF YOUR PROCEDURE:   -0-    DO NOT TAKE THESE MEDICATIONS 5-7 DAYS PRIOR to your procedure or per your surgeon's request:   ASPIRIN, ALEVE, ADVIL, IBUPROFEN, FISH OIL VITAMIN E, HERBALS    (May take Tylenol)    ONLY if you are prescribed any types of blood thinners such as:  Aspirin, Coumadin, Plavix, Pradaxa, Xarelto, Aggrenox, Effient, Eliquis, Savasya, Brilinta, or any other, ask your surgeon whether you should stop taking them and how long before surgery you should stop.  You may also need to verify with the prescribing physician if it is ok to stop your medication.      INSTRUCTIONS IMPORTANT!!  Do not eat or drink anything between midnight and the time of your procedure- this includes gum, mints, and candy.  Do not smoke or drink alcoholic beverages 24 hours prior to your procedure.  Shower the night before AND the morning of your procedure with a Chlorhexidine wash such as Hibiclens or Dial antibacterial soap from the neck down.  Do not get it on your face or in your eyes.  You may use your own shampoo and face wash. This helps your skin to be as bacteria free as possible.    If you wear contact lenses, dentures, hearing aids or glasses, bring a container to put them in during surgery and give to a family member for safe  keeping.  Please leave all jewelry, piercing's and valuables at home.   DO NOT remove hair from the surgery site.  Do not shave the incision site unless you are given specific instructions to do so.    ONLY if you have been diagnosed with sleep apnea please bring your C-PAP machine.  ONLY if you wear home oxygen please bring your portable oxygen tank the day of your procedure.  ONLY if you have a history of OPEN HEART SURGERY you will need a clearance from your Cardiologist per Anesthesia.      ONLY for patients requiring bowel prep, written instructions will be given by your doctor's office.  ONLY if you have a neuro stimulator, please bring the controller with you the morning of surgery  ONLY if a type and screen test is needed before surgery, please return:  If your doctor has scheduled you for an overnight stay, bring a small overnight bag with any personal items you need.  Make arrangements in advance for transportation home by a responsible adult.  It is not safe to drive a vehicle during the 24 hours after anesthesia.      [facility] Visitor Policy    Effective September 26, 2022    [facility] will resume routine visitation for COVID-19 negative patients, including inpatients, outpatients, and procedural areas, in accordance with local [place] procedures.    All [facility] facilities and properties are tobacco free.  Smoking is NOT allowed.   If you have any questions about these instructions, call Pre-Op Admit  Nursing at [phone] or the Pre-Op Day Surgery Unit at [phone].

## 2023-02-01 LAB — 21HYDROXYLASE AB SER QL: NEGATIVE

## 2023-02-01 RX ORDER — SCOLOPAMINE TRANSDERMAL SYSTEM 1 MG/1
1 PATCH, EXTENDED RELEASE TRANSDERMAL
Status: CANCELLED | OUTPATIENT
Start: 2023-02-01 | End: 2023-02-02

## 2023-02-02 ENCOUNTER — ANESTHESIA (OUTPATIENT)
Dept: SURGERY | Facility: HOSPITAL | Age: 23
End: 2023-02-02
Payer: COMMERCIAL

## 2023-02-02 ENCOUNTER — NURSE TRIAGE (OUTPATIENT)
Dept: ADMINISTRATIVE | Facility: CLINIC | Age: 23
End: 2023-02-02
Payer: COMMERCIAL

## 2023-02-02 ENCOUNTER — HOSPITAL ENCOUNTER (OUTPATIENT)
Facility: HOSPITAL | Age: 23
Discharge: HOME | End: 2023-02-02
Attending: SURGERY | Admitting: SURGERY
Payer: COMMERCIAL

## 2023-02-02 DIAGNOSIS — K80.20 CHOLELITHIASIS: ICD-10-CM

## 2023-02-02 DIAGNOSIS — K80.20 CALCULUS OF GALLBLADDER WITHOUT CHOLECYSTITIS WITHOUT OBSTRUCTION: Primary | ICD-10-CM

## 2023-02-02 LAB
B-HCG UR QL: NEGATIVE
CTP QC/QA: YES

## 2023-02-02 PROCEDURE — 81025 URINE PREGNANCY TEST: CPT | Performed by: ANESTHESIOLOGY

## 2023-02-02 PROCEDURE — 36000708 HC OR TIME LEV III 1ST 15 MIN: Performed by: SURGERY

## 2023-02-02 PROCEDURE — D9220A PRA ANESTHESIA: ICD-10-PCS | Mod: ANES,,, | Performed by: ANESTHESIOLOGY

## 2023-02-02 PROCEDURE — 63600175 PHARM REV CODE 636 W HCPCS: Performed by: NURSE ANESTHETIST, CERTIFIED REGISTERED

## 2023-02-02 PROCEDURE — 63600175 PHARM REV CODE 636 W HCPCS: Performed by: ANESTHESIOLOGY

## 2023-02-02 PROCEDURE — 36000709 HC OR TIME LEV III EA ADD 15 MIN: Performed by: SURGERY

## 2023-02-02 PROCEDURE — 63600175 PHARM REV CODE 636 W HCPCS: Performed by: SURGERY

## 2023-02-02 PROCEDURE — D9220A PRA ANESTHESIA: ICD-10-PCS | Mod: CRNA,,, | Performed by: NURSE ANESTHETIST, CERTIFIED REGISTERED

## 2023-02-02 PROCEDURE — D9220A PRA ANESTHESIA: Mod: ANES,,, | Performed by: ANESTHESIOLOGY

## 2023-02-02 PROCEDURE — 47562 PR LAP,CHOLECYSTECTOMY: ICD-10-PCS | Mod: ,,, | Performed by: SURGERY

## 2023-02-02 PROCEDURE — D9220A PRA ANESTHESIA: Mod: CRNA,,, | Performed by: NURSE ANESTHETIST, CERTIFIED REGISTERED

## 2023-02-02 PROCEDURE — 47562 LAPAROSCOPIC CHOLECYSTECTOMY: CPT | Mod: ,,, | Performed by: SURGERY

## 2023-02-02 PROCEDURE — 88304 PR  SURG PATH,LEVEL III: ICD-10-PCS | Mod: 26,,, | Performed by: PATHOLOGY

## 2023-02-02 PROCEDURE — 88304 TISSUE EXAM BY PATHOLOGIST: CPT | Mod: 26,,, | Performed by: PATHOLOGY

## 2023-02-02 PROCEDURE — 37000008 HC ANESTHESIA 1ST 15 MINUTES: Performed by: SURGERY

## 2023-02-02 PROCEDURE — 25000003 PHARM REV CODE 250: Performed by: ANESTHESIOLOGY

## 2023-02-02 PROCEDURE — 71000033 HC RECOVERY, INTIAL HOUR: Performed by: SURGERY

## 2023-02-02 PROCEDURE — 99900103 DSU ONLY-NO CHARGE-INITIAL HR (STAT): Performed by: SURGERY

## 2023-02-02 PROCEDURE — 27201423 OPTIME MED/SURG SUP & DEVICES STERILE SUPPLY: Performed by: SURGERY

## 2023-02-02 PROCEDURE — 88304 TISSUE EXAM BY PATHOLOGIST: CPT | Performed by: PATHOLOGY

## 2023-02-02 PROCEDURE — 25000003 PHARM REV CODE 250: Performed by: NURSE ANESTHETIST, CERTIFIED REGISTERED

## 2023-02-02 PROCEDURE — 99900104 DSU ONLY-NO CHARGE-EA ADD'L HR (STAT): Performed by: SURGERY

## 2023-02-02 PROCEDURE — 94799 UNLISTED PULMONARY SVC/PX: CPT

## 2023-02-02 PROCEDURE — 71000039 HC RECOVERY, EACH ADD'L HOUR: Performed by: SURGERY

## 2023-02-02 PROCEDURE — 37000009 HC ANESTHESIA EA ADD 15 MINS: Performed by: SURGERY

## 2023-02-02 PROCEDURE — 71000015 HC POSTOP RECOV 1ST HR: Performed by: SURGERY

## 2023-02-02 PROCEDURE — 25000003 PHARM REV CODE 250: Performed by: SURGERY

## 2023-02-02 RX ORDER — ONDANSETRON HYDROCHLORIDE 2 MG/ML
INJECTION, SOLUTION INTRAMUSCULAR; INTRAVENOUS
Status: DISCONTINUED | OUTPATIENT
Start: 2023-02-02 | End: 2023-02-02

## 2023-02-02 RX ORDER — SODIUM CHLORIDE 9 MG/ML
INJECTION, SOLUTION INTRAVENOUS CONTINUOUS
Status: DISCONTINUED | OUTPATIENT
Start: 2023-02-02 | End: 2023-02-02 | Stop reason: HOSPADM

## 2023-02-02 RX ORDER — PROPOFOL 10 MG/ML
VIAL (ML) INTRAVENOUS
Status: DISCONTINUED | OUTPATIENT
Start: 2023-02-02 | End: 2023-02-02

## 2023-02-02 RX ORDER — METOCLOPRAMIDE HYDROCHLORIDE 5 MG/ML
10 INJECTION INTRAMUSCULAR; INTRAVENOUS EVERY 10 MIN PRN
Status: COMPLETED | OUTPATIENT
Start: 2023-02-02 | End: 2023-02-02

## 2023-02-02 RX ORDER — DEXAMETHASONE SODIUM PHOSPHATE 4 MG/ML
INJECTION, SOLUTION INTRA-ARTICULAR; INTRALESIONAL; INTRAMUSCULAR; INTRAVENOUS; SOFT TISSUE
Status: DISCONTINUED | OUTPATIENT
Start: 2023-02-02 | End: 2023-02-02

## 2023-02-02 RX ORDER — BUPIVACAINE HYDROCHLORIDE AND EPINEPHRINE 5; 5 MG/ML; UG/ML
INJECTION, SOLUTION EPIDURAL; INTRACAUDAL; PERINEURAL
Status: DISCONTINUED | OUTPATIENT
Start: 2023-02-02 | End: 2023-02-02 | Stop reason: HOSPADM

## 2023-02-02 RX ORDER — ROCURONIUM BROMIDE 10 MG/ML
INJECTION, SOLUTION INTRAVENOUS
Status: DISCONTINUED | OUTPATIENT
Start: 2023-02-02 | End: 2023-02-02

## 2023-02-02 RX ORDER — NEOSTIGMINE METHYLSULFATE 1 MG/ML
INJECTION, SOLUTION INTRAVENOUS
Status: DISCONTINUED | OUTPATIENT
Start: 2023-02-02 | End: 2023-02-02

## 2023-02-02 RX ORDER — FENTANYL CITRATE 50 UG/ML
INJECTION, SOLUTION INTRAMUSCULAR; INTRAVENOUS
Status: DISCONTINUED | OUTPATIENT
Start: 2023-02-02 | End: 2023-02-02

## 2023-02-02 RX ORDER — SODIUM CHLORIDE, SODIUM LACTATE, POTASSIUM CHLORIDE, CALCIUM CHLORIDE 600; 310; 30; 20 MG/100ML; MG/100ML; MG/100ML; MG/100ML
INJECTION, SOLUTION INTRAVENOUS CONTINUOUS
Status: DISCONTINUED | OUTPATIENT
Start: 2023-02-02 | End: 2024-03-05

## 2023-02-02 RX ORDER — HYDROCODONE BITARTRATE AND ACETAMINOPHEN 5; 325 MG/1; MG/1
1 TABLET ORAL EVERY 6 HOURS PRN
Qty: 20 TABLET | Refills: 0 | Status: SHIPPED | OUTPATIENT
Start: 2023-02-02 | End: 2023-11-09

## 2023-02-02 RX ORDER — LIDOCAINE HYDROCHLORIDE 10 MG/ML
1 INJECTION, SOLUTION EPIDURAL; INFILTRATION; INTRACAUDAL; PERINEURAL ONCE
Status: DISCONTINUED | OUTPATIENT
Start: 2023-02-02 | End: 2024-03-05

## 2023-02-02 RX ORDER — FENTANYL CITRATE 50 UG/ML
25 INJECTION, SOLUTION INTRAMUSCULAR; INTRAVENOUS EVERY 5 MIN PRN
Status: COMPLETED | OUTPATIENT
Start: 2023-02-02 | End: 2023-02-02

## 2023-02-02 RX ORDER — LIDOCAINE HCL/PF 100 MG/5ML
SYRINGE (ML) INTRAVENOUS
Status: DISCONTINUED | OUTPATIENT
Start: 2023-02-02 | End: 2023-02-02

## 2023-02-02 RX ORDER — OXYCODONE HYDROCHLORIDE 5 MG/1
5 TABLET ORAL ONCE AS NEEDED
Status: COMPLETED | OUTPATIENT
Start: 2023-02-02 | End: 2023-02-02

## 2023-02-02 RX ORDER — ACETAMINOPHEN 10 MG/ML
INJECTION, SOLUTION INTRAVENOUS
Status: DISCONTINUED | OUTPATIENT
Start: 2023-02-02 | End: 2023-02-02

## 2023-02-02 RX ORDER — CEFAZOLIN SODIUM 2 G/50ML
2 SOLUTION INTRAVENOUS
Status: COMPLETED | OUTPATIENT
Start: 2023-02-02 | End: 2023-02-02

## 2023-02-02 RX ADMIN — NEOSTIGMINE METHYLSULFATE 5 MG: 1 INJECTION INTRAVENOUS at 08:02

## 2023-02-02 RX ADMIN — FENTANYL CITRATE 100 MCG: 50 INJECTION, SOLUTION INTRAMUSCULAR; INTRAVENOUS at 07:02

## 2023-02-02 RX ADMIN — ONDANSETRON 4 MG: 2 INJECTION INTRAMUSCULAR; INTRAVENOUS at 07:02

## 2023-02-02 RX ADMIN — PROPOFOL 200 MG: 10 INJECTION, EMULSION INTRAVENOUS at 07:02

## 2023-02-02 RX ADMIN — METOCLOPRAMIDE 10 MG: 5 INJECTION, SOLUTION INTRAMUSCULAR; INTRAVENOUS at 09:02

## 2023-02-02 RX ADMIN — FENTANYL CITRATE 50 MCG: 50 INJECTION, SOLUTION INTRAMUSCULAR; INTRAVENOUS at 08:02

## 2023-02-02 RX ADMIN — FENTANYL CITRATE 25 MCG: 50 INJECTION INTRAMUSCULAR; INTRAVENOUS at 09:02

## 2023-02-02 RX ADMIN — SODIUM CHLORIDE, SODIUM GLUCONATE, SODIUM ACETATE, POTASSIUM CHLORIDE AND MAGNESIUM CHLORIDE: 526; 502; 368; 37; 30 INJECTION, SOLUTION INTRAVENOUS at 06:02

## 2023-02-02 RX ADMIN — SODIUM CHLORIDE, SODIUM GLUCONATE, SODIUM ACETATE, POTASSIUM CHLORIDE AND MAGNESIUM CHLORIDE: 526; 502; 368; 37; 30 INJECTION, SOLUTION INTRAVENOUS at 08:02

## 2023-02-02 RX ADMIN — CEFAZOLIN SODIUM 2 G: 2 SOLUTION INTRAVENOUS at 07:02

## 2023-02-02 RX ADMIN — ACETAMINOPHEN 1000 MG: 10 INJECTION, SOLUTION INTRAVENOUS at 07:02

## 2023-02-02 RX ADMIN — LIDOCAINE HYDROCHLORIDE 100 MG: 20 INJECTION INTRAVENOUS at 07:02

## 2023-02-02 RX ADMIN — GLYCOPYRROLATE 0.6 MG: 0.2 INJECTION, SOLUTION INTRAMUSCULAR; INTRAVITREAL at 08:02

## 2023-02-02 RX ADMIN — OXYCODONE 5 MG: 5 TABLET ORAL at 09:02

## 2023-02-02 RX ADMIN — ROCURONIUM BROMIDE 30 MG: 10 INJECTION, SOLUTION INTRAVENOUS at 07:02

## 2023-02-02 RX ADMIN — DEXAMETHASONE SODIUM PHOSPHATE 4 MG: 4 INJECTION, SOLUTION INTRA-ARTICULAR; INTRALESIONAL; INTRAMUSCULAR; INTRAVENOUS; SOFT TISSUE at 07:02

## 2023-02-02 RX ADMIN — FENTANYL CITRATE 25 MCG: 50 INJECTION INTRAMUSCULAR; INTRAVENOUS at 10:02

## 2023-02-02 NOTE — DISCHARGE SUMMARY
Discharge Summary  General Surgery      Admit Date: 2/2/2023    Discharge Date :2/2/2023    Attending Physician: Trav Thomson     Discharge Physician: Trav Thomson    Discharged Condition: good    Discharge Diagnosis: Calculus of gallbladder without cholecystitis without obstruction [K80.20]    Treatments/Procedures: Procedure(s) (LRB):  CHOLECYSTECTOMY, LAPAROSCOPIC (N/A)    Hospital Course: Uneventful; Discharged home from Recovery    Significant Diagnostic Studies: none    Disposition: Home or Self Care    Diet: Regular    Follow up: Office 10-14 days    Activity: No heavy lifting till seen in office.    Patient Instructions:   Current Discharge Medication List        START taking these medications    Details   HYDROcodone-acetaminophen (NORCO) 5-325 mg per tablet Take 1 tablet by mouth every 6 (six) hours as needed for Pain.  Qty: 20 tablet, Refills: 0    Comments: Quantity prescribed more than 7 day supply? No           CONTINUE these medications which have NOT CHANGED    Details   ferrous gluconate (FERGON) 240 (27 FE) MG tablet Take 2 tablets (480 mg total) by mouth 2 (two) times daily.  Qty: 60 tablet, Refills: 5    Associated Diagnoses: Iron deficiency anemia due to chronic blood loss; Menorrhagia, premenopausal             Discharge Procedure Orders   Diet general

## 2023-02-02 NOTE — TELEPHONE ENCOUNTER
Reason for Disposition   Prescription not at pharmacy and was prescribed by PCP recently  (Exception: triager has access to EMR and prescription is recorded there. Go to Home Care and confirm for pharmacy.)     Post op patient needs the [place] order sent to [facility].  The [facility] pharmacy cannot fill for her due to no supply.    Protocols used: Medication Question Call-A-OH      Ricardo states she just left the hospital following gall bladder removal, and the [facility] in [place] does not have [place] 5-325 mg as was prescribed by her surgeon, Dr Trav Thomson.  She states she is at the pharmacy now, and would like Dr Thomson to cancel this order and send new order to [facility] in [facility].  Assured her I will message Dr Thomson with request for new order for pain medication.  Updated with the requested [facility] pharmacy in Epic.  Message to Dr Thomson with high importance.  Please contact caller directly with any additional care advice.    ADDENDUM 12:35 - called Ricardo to let her know Dr Thomson did order her [place] at [facility] in [facility] one hour ago.  She states she will go get it now.  No other needs at this time.

## 2023-02-02 NOTE — H&P
Patient ID: Ricardo Rivera is a 21 y.o. female.     Chief Complaint: Consult (F/U E.R./Gallstones)        HPI 21-year-old female referred from the emergency department for symptomatic cholelithiasis.  She was seen last week and noted to have uncomplicated cholelithiasis.  She did have some nausea but no vomiting.  She denies any fever or chills.  She is had similar symptoms to this in the past.  She describes epigastric and right upper quadrant pain which radiates into her back.  Did not appear to be associated with any particular food.  She is noted to be anemic at this time.  Her hemoglobin is 7.8.  She does have a history of iron-deficiency anemia.  She was in the hospital back in 2021 with a pulmonary embolus.  Her hemoglobin was in the 6 range then and she received 2 units of red cells.  She was discharged on Eliquis but really did not follow-up.  It is unclear how long she was on the Eliquis.  Her symptoms right now are stable.  She is not having any     Pain.  She wants to proceed with cholecystectomy.  I think she needs to at least be seen by primary care again for clearance and to evaluate for ongoing treatment for her anemia.  She denies any blood in her stools.  She denies abnormally heavy menses.  He does have a history of iron infusions in the past as well.          Past Medical History:   Diagnosis Date    Iron deficiency anemia 2018            Past Surgical History:   Procedure Laterality Date     SECTION N/A 2021     Procedure:  SECTION;  Surgeon: Rafael Rodriguez MD;  [facility]: [facility];  Service: OB/GYN;  Laterality: N/A;            Current Outpatient Medications:     EScitalopram oxalate (LEXAPRO) 10 MG tablet, Take 10 mg by mouth every morning., Disp: , Rfl:     EScitalopram oxalate (LEXAPRO) 5 MG Tab, Take 5 mg by mouth once daily., Disp: , Rfl:     multivit-min/iron/folic acid/K (ADULTS MULTIVITAMIN ORAL), TAKE 1 TABLET BY MOUTH ONCE DAILY.,  Disp: , Rfl:     ondansetron (ZOFRAN-ODT) 4 MG TbDL, Take 1 tablet (4 mg total) by mouth every 6 (six) hours as needed (Nausea or vomiting). (Patient not taking: Reported on 10/3/2022), Disp: 30 tablet, Rfl: 0    traZODone (DESYREL) 50 MG tablet, Take 25-50 mg by mouth nightly as needed., Disp: , Rfl:      Review of patient's allergies indicates:  No Known Allergies           Family History   Problem Relation Age of Onset    No Known Problems Mother      No Known Problems Father      Breast cancer Neg Hx      Ovarian cancer Neg Hx        Social History            Socioeconomic History    Marital status: Single   Tobacco Use    Smoking status: Never    Smokeless tobacco: Never   Substance and Sexual Activity    Alcohol use: No    Drug use: No    Sexual activity: Yes       Partners: Male         Review of Systems   Constitutional:  Negative for activity change, chills, fever and unexpected weight change.   HENT:  Negative for congestion, sore throat, trouble swallowing and voice change.    Eyes:  Negative for redness and visual disturbance.   Respiratory:  Negative for cough, shortness of breath and wheezing.    Cardiovascular:  Negative for chest pain and palpitations.   Gastrointestinal:  Positive for abdominal pain and nausea. Negative for blood in stool and vomiting.   Endocrine: Negative.    Genitourinary:  Negative for dysuria, frequency and hematuria.   Musculoskeletal:  Negative for arthralgias, back pain and neck pain.   Skin:  Negative for rash and wound.   Allergic/Immunologic: Negative.    Neurological:  Negative for dizziness, weakness and headaches.   Hematological:  Negative for adenopathy.   Psychiatric/Behavioral:  Negative for agitation and dysphoric mood. The patient is not nervous/anxious.    Objective:      Physical Exam  Constitutional:       General: She is not in acute distress.     Appearance: She is well-developed.   HENT:      Head: Normocephalic and atraumatic.   Eyes:      General: No  scleral icterus.     Conjunctiva/sclera: Conjunctivae normal.      Pupils: Pupils are equal, round, and reactive to light.   Neck:      Thyroid: No thyromegaly.   Cardiovascular:      Rate and Rhythm: Normal rate and regular rhythm.      Heart sounds: No murmur heard.  Pulmonary:      Effort: Pulmonary effort is normal.      Breath sounds: Normal breath sounds. No wheezing or rales.   Abdominal:      General: Bowel sounds are normal. There is no distension.      Palpations: Abdomen is soft. There is no mass.      Tenderness: There is no abdominal tenderness.      Hernia: No hernia is present.   Musculoskeletal:         General: Normal range of motion.      Cervical back: Normal range of motion.   Lymphadenopathy:      Cervical: No cervical adenopathy.   Skin:     General: Skin is warm and dry.      Findings: No erythema or rash.   Neurological:      Mental Status: She is alert and oriented to person, place, and time.   Psychiatric:         Behavior: Behavior normal.      Assessment:           Encounter Diagnosis   Name Primary?    Calculus of gallbladder without cholecystitis without obstruction Yes         Plan:      Patient will need laparoscopic cholecystectomy.  I have discussed proceeding with lap mert with her hematologist. Still anemic but he is in agreement to proceed as she has been very symptomatic.  Risks and benefits of the planned procedure were discussed at length with the patient.  Risks and benefits of not proceeding with the procedure were discussed as well. All questions were answered. The patient expressed clear understanding and would like to proceed with the procedure as discussed.

## 2023-02-02 NOTE — CARE UPDATE
02/02/23 0658   Patient Assessment/Suction   Level of Consciousness (AVPU) alert   Incentive Spirometer   $ Incentive Spirometer Charges preop instruction   Incentive Spirometer Predicted Level (mL) 1960   Administration (IS) instruction provided, initial;mouthpiece utilized   Number of Repetitions (IS) 2   Level Incentive Spirometer (mL) 2000   Patient Tolerance (IS) good;no adverse signs/symptoms present

## 2023-02-02 NOTE — TELEPHONE ENCOUNTER
I contacted Dr Thomson and he sent the prescription to [facility] per patient request

## 2023-02-02 NOTE — PLAN OF CARE
Pt awake, alert.  Vital signs stable, denies nausea, abd soft, dressings dry and intact. No adverse effects of anesthesia noted.  Discharged per wheelchair with spouse.

## 2023-02-02 NOTE — PROGRESS NOTES
Criteria met per anesthesia for transfer to post op Alert tolerating po fluids Pain controlled with IV and po analgesics Transferred per stretcher to phase 2 report given to Halima. Pts family notified

## 2023-02-02 NOTE — ANESTHESIA PREPROCEDURE EVALUATION
02/02/2023  Ricardo Rivera is a 22 y.o., female.      Pre-op Assessment    I have reviewed the Patient Summary Reports.     I have reviewed the Nursing Notes. I have reviewed the NPO Status.   I have reviewed the Medications.     Review of Systems  Anesthesia Hx:  Denies Family Hx of Anesthesia complications.   Denies Personal Hx of Anesthesia complications.   Hematology/Oncology:         -- Anemia:   Cardiovascular:   H/o PE   Neurological:   Peripheral Neuropathy    Endocrine:  Obesity / BMI > 30  Psych:   Psychiatric History          Physical Exam  General: Cooperative, Alert, Oriented and Anxious    Airway:  Mallampati: III   Mouth Opening: Normal  TM Distance: Normal  Tongue: Normal  Neck ROM: Normal ROM    Dental:  Intact    Chest/Lungs:  Normal Respiratory Rate    Heart:  Rate: Normal  Rhythm: Regular Rhythm        Anesthesia Plan  Type of Anesthesia, risks & benefits discussed:    Anesthesia Type: Gen ETT  Intra-op Monitoring Plan: Standard ASA Monitors  Post Op Pain Control Plan: multimodal analgesia  Induction:  IV  Airway Plan: Video, Post-Induction  Informed Consent: Informed consent signed with the Patient and all parties understand the risks and agree with anesthesia plan.  All questions answered.   ASA Score: 2    Ready For Surgery From Anesthesia Perspective.     .

## 2023-02-02 NOTE — ANESTHESIA PROCEDURE NOTES
Intubation    Date/Time: 2/2/2023 7:40 AM  Performed by: Jesus Reich CRNA  Authorized by: Rakesh Amin MD     Intubation:     Induction:  Intravenous    Intubated:  Postinduction    Mask Ventilation:  N/a    Attempts:  1    Attempted By:  CRNA    Method of Intubation:  Video laryngoscopy (incentive)    Blade:  Ann 3    Laryngeal View Grade: Grade I - full view of cords      Difficult Airway Encountered?: No      Complications:  None    Airway Device:  Oral endotracheal tube    Airway Device Size:  7.5    Style/Cuff Inflation:  Cuffed    Tube secured:  22    Secured at:  The lips    Placement Verified By:  Capnometry    Complicating Factors:  None    Findings Post-Intubation:  BS equal bilateral

## 2023-02-02 NOTE — OP NOTE
PATIENT NAME:  Ricardo Rivera     DATE OF PROCEDURE: 2/2/2023    PROCEDURE: Laparoscopic Cholecystectomy    PREOPERATIVE DIAGNOSIS: Cholelithiasis / Cholecystitis   POSTOPERATIVE DIAGNOSIS: Cholelithiasis / Cholecystitis     SURGEON: Trav Abrams Jr., M.D.  ASSISTANT: None     DESCRIPTION OF PROCEDURE: After appropriate consent was obtained, consent forms   signed and questions answered, the patient was taken to the Operating Room and   placed on the operating room table where general endotracheal anesthesia was   induced without difficulty. Preoperative antibiotics were administered. SCDs were in   place. A Timeout procedure was performed. The abdomen was prepped and draped in the usual sterile fashion. A midline incision was made beneath the umbilicus. Dissection was performed down to the fascia, which was incised. Stay sutures were placed on the fascia and the Maude trocar was inserted. The abdomen was insufflated. Under direct   vision and after injection with local anesthetic, a 5 mm trocar was placed in   the upper midline. A second 5 mm trocar was placed between these two. The   gallbladder was identified, grasped, and retracted. There was some mild to moderate  inflammation. The cystic duct was identified and carefully dissected. Three clips   were placed on the common duct side, 2 on the gallbladder side, and the duct was   transected. The artery was identified, dissected, clipped and cut as well. The   gallbladder was then dissected free of the liver bed with electrocautery. It was   placed in a retrieval bag and removed through the umbilical port site. No bile   was spilled. The gallbladder fossa was examined and found to be hemostatic. The trocars were then removed under direct vision.The abdomen was desufflated. The fascia at the umbilicus was closed with interrupted 0 Vicryl suture and additional local was injected. The skin was then closed with 4-0 Monocryl subcuticular stitches. Steri-Strips  were then applied to all incisions. The patient was awakened, extubated and transferred to the Recovery Room in good condition. The patient tolerated the procedure without complication. Lap and instrument counts were reported as correct at the termination of the procedure.    EBL: 5 cc  SPECIMEN: gallbladder  COMPLICATIONS: none  ANESTHESIA: General

## 2023-02-02 NOTE — TRANSFER OF CARE
"Anesthesia Transfer of Care Note    Patient: Ricardo Rivera    Procedure(s) Performed: Procedure(s) (LRB):  CHOLECYSTECTOMY, LAPAROSCOPIC (N/A)    Patient location: PACU    Anesthesia Type: general    Transport from OR: Transported from OR on 2-3 L/min O2 by NC with adequate spontaneous ventilation    Post pain: adequate analgesia    Post assessment: no apparent anesthetic complications    Post vital signs: stable    Level of consciousness: awake    Nausea/Vomiting: no nausea/vomiting    Complications: none    Transfer of care protocol was followed      Last vitals:   Visit Vitals  BP (!) 105/55 (BP Location: Left arm, Patient Position: Lying)   Pulse 68   Resp 16   Ht 5' 5" (1.651 m)   Wt 96.2 kg (212 lb)   LMP  (LMP Unknown)   SpO2 99%   Breastfeeding No   BMI 35.28 kg/m²     "

## 2023-02-02 NOTE — ANESTHESIA POSTPROCEDURE EVALUATION
Anesthesia Post Evaluation    Patient: Ricardo Rivera    Procedure(s) Performed: Procedure(s) (LRB):  CHOLECYSTECTOMY, LAPAROSCOPIC (N/A)    Final Anesthesia Type: general      Patient location during evaluation: PACU  Patient participation: Yes- Able to Participate  Level of consciousness: awake and alert  Post-procedure vital signs: reviewed and stable  Pain management: adequate  Airway patency: patent    PONV status at discharge: No PONV  Anesthetic complications: no      Cardiovascular status: blood pressure returned to baseline  Respiratory status: unassisted  Hydration status: euvolemic  Follow-up not needed.          Vitals Value Taken Time   /70 02/02/23 1026   Temp 36.6 °C (97.9 °F) 02/02/23 1000   Pulse 58 02/02/23 1026   Resp 18 02/02/23 1026   SpO2 96 % 02/02/23 1026         Event Time   Out of Recovery 10:17:00         Pain/Feng Score: Pain Rating Prior to Med Admin: 6 (2/2/2023 10:00 AM)  Pain Rating Post Med Admin: 6 (2/2/2023 10:17 AM)  Feng Score: 10 (2/2/2023 10:00 AM)  Modified Feng Score: 19 (2/2/2023 10:35 AM)

## 2023-02-03 ENCOUNTER — PATIENT MESSAGE (OUTPATIENT)
Dept: SURGERY | Facility: CLINIC | Age: 23
End: 2023-02-03
Payer: COMMERCIAL

## 2023-02-03 VITALS
SYSTOLIC BLOOD PRESSURE: 124 MMHG | TEMPERATURE: 98 F | OXYGEN SATURATION: 96 % | HEIGHT: 65 IN | DIASTOLIC BLOOD PRESSURE: 70 MMHG | WEIGHT: 212 LBS | RESPIRATION RATE: 18 BRPM | BODY MASS INDEX: 35.32 KG/M2 | HEART RATE: 58 BPM

## 2023-02-03 NOTE — PROGRESS NOTES
"Very pleased with care given and very pleased with nurses and staff.  She stated " this was the best care I have had in the hospital - very caring and kind!"  Voiced understanding of all discharge instructions.  "

## 2023-02-07 LAB
FINAL PATHOLOGIC DIAGNOSIS: NORMAL
GROSS: NORMAL
Lab: NORMAL

## 2023-02-08 ENCOUNTER — OFFICE VISIT (OUTPATIENT)
Dept: OBSTETRICS AND GYNECOLOGY | Facility: CLINIC | Age: 23
End: 2023-02-08
Payer: COMMERCIAL

## 2023-02-08 VITALS
HEIGHT: 65 IN | WEIGHT: 213.88 LBS | BODY MASS INDEX: 35.63 KG/M2 | DIASTOLIC BLOOD PRESSURE: 60 MMHG | SYSTOLIC BLOOD PRESSURE: 104 MMHG

## 2023-02-08 DIAGNOSIS — N92.4 MENORRHAGIA, PREMENOPAUSAL: Primary | ICD-10-CM

## 2023-02-08 PROCEDURE — 99213 PR OFFICE/OUTPT VISIT, EST, LEVL III, 20-29 MIN: ICD-10-PCS | Mod: S$GLB,,, | Performed by: SPECIALIST

## 2023-02-08 PROCEDURE — 99999 PR PBB SHADOW E&M-EST. PATIENT-LVL III: ICD-10-PCS | Mod: PBBFAC,,, | Performed by: SPECIALIST

## 2023-02-08 PROCEDURE — 3078F DIAST BP <80 MM HG: CPT | Mod: CPTII,S$GLB,, | Performed by: SPECIALIST

## 2023-02-08 PROCEDURE — 3078F PR MOST RECENT DIASTOLIC BLOOD PRESSURE < 80 MM HG: ICD-10-PCS | Mod: CPTII,S$GLB,, | Performed by: SPECIALIST

## 2023-02-08 PROCEDURE — 1159F PR MEDICATION LIST DOCUMENTED IN MEDICAL RECORD: ICD-10-PCS | Mod: CPTII,S$GLB,, | Performed by: SPECIALIST

## 2023-02-08 PROCEDURE — 3008F BODY MASS INDEX DOCD: CPT | Mod: CPTII,S$GLB,, | Performed by: SPECIALIST

## 2023-02-08 PROCEDURE — 3074F SYST BP LT 130 MM HG: CPT | Mod: CPTII,S$GLB,, | Performed by: SPECIALIST

## 2023-02-08 PROCEDURE — 99213 OFFICE O/P EST LOW 20 MIN: CPT | Mod: S$GLB,,, | Performed by: SPECIALIST

## 2023-02-08 PROCEDURE — 1159F MED LIST DOCD IN RCRD: CPT | Mod: CPTII,S$GLB,, | Performed by: SPECIALIST

## 2023-02-08 PROCEDURE — 99999 PR PBB SHADOW E&M-EST. PATIENT-LVL III: CPT | Mod: PBBFAC,,, | Performed by: SPECIALIST

## 2023-02-08 PROCEDURE — 3074F PR MOST RECENT SYSTOLIC BLOOD PRESSURE < 130 MM HG: ICD-10-PCS | Mod: CPTII,S$GLB,, | Performed by: SPECIALIST

## 2023-02-08 PROCEDURE — 3008F PR BODY MASS INDEX (BMI) DOCUMENTED: ICD-10-PCS | Mod: CPTII,S$GLB,, | Performed by: SPECIALIST

## 2023-02-08 NOTE — PROGRESS NOTES
21 yo WF  with history menorrhagia to anemia  Pt returns for follow up Underwent PCOS  as well as pelvic u/s  No overt abnormalities.  Past Medical History:   Diagnosis Date    Iron deficiency anemia 2018       Past Surgical History:   Procedure Laterality Date     SECTION N/A 2021    Procedure:  SECTION;  Surgeon: Rafael Rodriguez MD;  Location: [facility];  Service: OB/GYN;  Laterality: N/A;    LAPAROSCOPIC CHOLECYSTECTOMY N/A 2023    Procedure: CHOLECYSTECTOMY, LAPAROSCOPIC;  Surgeon: Trav Thomson MD;  Location: [facility];  Service: General;  Laterality: N/A;       Family History   Problem Relation Age of Onset    No Known Problems Mother     No Known Problems Father     Breast cancer Neg Hx     Ovarian cancer Neg Hx        Social History     Socioeconomic History    Marital status: Single   Tobacco Use    Smoking status: Every Day     Types: Vaping with nicotine    Smokeless tobacco: Never   Substance and Sexual Activity    Alcohol use: No    Drug use: No    Sexual activity: Yes     Partners: Male       Current Outpatient Medications   Medication Sig Dispense Refill    ferrous gluconate (FERGON) 240 (27 FE) MG tablet Take 2 tablets (480 mg total) by mouth 2 (two) times daily. (Patient not taking: Reported on 2023) 60 tablet 5    HYDROcodone-acetaminophen (NORCO) 5-325 mg per tablet Take 1 tablet by mouth every 6 (six) hours as needed for Pain. (Patient not taking: Reported on 2023) 20 tablet 0    HYDROcodone-acetaminophen (NORCO) 5-325 mg per tablet Take 1 tablet by mouth every 6 (six) hours as needed for Pain. (Patient not taking: Reported on 2023) 20 tablet 0     No current facility-administered medications for this visit.     Facility-Administered Medications Ordered in Other Visits   Medication Dose Route Frequency Provider Last Rate Last Admin    electrolyte-S (ISOLYTE)   Intravenous Continuous Steven Cooper MD   Stopped at 23 1010     lactated ringers infusion   Intravenous Continuous Steven Cooper MD        LIDOcaine (PF) 10 mg/ml (1%) injection 10 mg  1 mL Intradermal Once Steven Cooper MD           Review of patient's allergies indicates:  No Known Allergies    Review of System:   General: no chills, fever, night sweats, weight gain or weight loss  Psychological: no depression or suicidal ideation  Breasts: no new or changing breast lumps, nipple discharge or masses.  Respiratory: no cough, shortness of breath, or wheezing  Cardiovascular: no chest pain or dyspnea on exertion  Gastrointestinal: no abdominal pain, change in bowel habits, or black or bloody stools  Genito-Urinary: no incontinence, urinary frequency/urgency or vulvar/vaginal symptoms, pelvic pain or abnormal vaginal bleeding.  Musculoskeletal: no gait disturbance or muscular weakness     I had a 20 min discussion regarding menorrhagia and tx options  due to past history pt is not an OCP candidate  I discussed progestin based IUD   Discussed risks and benefits and pt is to consider  Answered all questions and will follow

## 2023-02-09 ENCOUNTER — INFUSION (OUTPATIENT)
Dept: INFUSION THERAPY | Facility: HOSPITAL | Age: 23
End: 2023-02-09
Attending: INTERNAL MEDICINE
Payer: COMMERCIAL

## 2023-02-09 VITALS
BODY MASS INDEX: 35.99 KG/M2 | TEMPERATURE: 98 F | HEART RATE: 68 BPM | WEIGHT: 216 LBS | DIASTOLIC BLOOD PRESSURE: 60 MMHG | SYSTOLIC BLOOD PRESSURE: 96 MMHG | RESPIRATION RATE: 18 BRPM | OXYGEN SATURATION: 99 % | HEIGHT: 65 IN

## 2023-02-09 DIAGNOSIS — D50.0 IRON DEFICIENCY ANEMIA DUE TO CHRONIC BLOOD LOSS: Primary | ICD-10-CM

## 2023-02-09 PROCEDURE — 96365 THER/PROPH/DIAG IV INF INIT: CPT

## 2023-02-09 PROCEDURE — 25000003 PHARM REV CODE 250: Performed by: INTERNAL MEDICINE

## 2023-02-09 PROCEDURE — 63600175 PHARM REV CODE 636 W HCPCS: Performed by: INTERNAL MEDICINE

## 2023-02-09 RX ORDER — SODIUM CHLORIDE 0.9 % (FLUSH) 0.9 %
10 SYRINGE (ML) INJECTION
Status: CANCELLED | OUTPATIENT
Start: 2023-02-16

## 2023-02-09 RX ORDER — SODIUM CHLORIDE 0.9 % (FLUSH) 0.9 %
10 SYRINGE (ML) INJECTION
Status: DISCONTINUED | OUTPATIENT
Start: 2023-02-09 | End: 2023-02-09 | Stop reason: HOSPADM

## 2023-02-09 RX ORDER — HEPARIN 100 UNIT/ML
500 SYRINGE INTRAVENOUS
Status: CANCELLED | OUTPATIENT
Start: 2023-02-16

## 2023-02-09 RX ADMIN — IRON SUCROSE 100 MG: 20 INJECTION, SOLUTION INTRAVENOUS at 02:02

## 2023-02-09 NOTE — PLAN OF CARE
Problem: Anemia  Goal: Anemia Symptom Improvement  Outcome: Ongoing, Progressing  Intervention: Monitor and Manage Anemia  Flowsheets (Taken 2/9/2023 1435)  Oral Nutrition Promotion:   nutritional therapy counseling provided   rest periods promoted   medicated  Safety Promotion/Fall Prevention:   instructed to call staff for mobility   supervised activity   Fall Risk reviewed with patient/family   in recliner, wheels locked  Fatigue Management:   paced activity encouraged   fatigue-related activity identified   frequent rest breaks encouraged

## 2023-02-14 ENCOUNTER — OFFICE VISIT (OUTPATIENT)
Dept: SURGERY | Facility: CLINIC | Age: 23
End: 2023-02-14
Payer: COMMERCIAL

## 2023-02-14 VITALS
WEIGHT: 219.56 LBS | DIASTOLIC BLOOD PRESSURE: 57 MMHG | SYSTOLIC BLOOD PRESSURE: 106 MMHG | BODY MASS INDEX: 36.58 KG/M2 | TEMPERATURE: 98 F | HEART RATE: 89 BPM | HEIGHT: 65 IN

## 2023-02-14 DIAGNOSIS — Z98.890 POST-OPERATIVE STATE: Primary | ICD-10-CM

## 2023-02-14 PROCEDURE — 3078F DIAST BP <80 MM HG: CPT | Mod: CPTII,S$GLB,, | Performed by: SURGERY

## 2023-02-14 PROCEDURE — 99999 PR PBB SHADOW E&M-EST. PATIENT-LVL III: CPT | Mod: PBBFAC,,, | Performed by: SURGERY

## 2023-02-14 PROCEDURE — 1159F MED LIST DOCD IN RCRD: CPT | Mod: CPTII,S$GLB,, | Performed by: SURGERY

## 2023-02-14 PROCEDURE — 1160F PR REVIEW ALL MEDS BY PRESCRIBER/CLIN PHARMACIST DOCUMENTED: ICD-10-PCS | Mod: CPTII,S$GLB,, | Performed by: SURGERY

## 2023-02-14 PROCEDURE — 3008F PR BODY MASS INDEX (BMI) DOCUMENTED: ICD-10-PCS | Mod: CPTII,S$GLB,, | Performed by: SURGERY

## 2023-02-14 PROCEDURE — 3074F SYST BP LT 130 MM HG: CPT | Mod: CPTII,S$GLB,, | Performed by: SURGERY

## 2023-02-14 PROCEDURE — 99999 PR PBB SHADOW E&M-EST. PATIENT-LVL III: ICD-10-PCS | Mod: PBBFAC,,, | Performed by: SURGERY

## 2023-02-14 PROCEDURE — 3078F PR MOST RECENT DIASTOLIC BLOOD PRESSURE < 80 MM HG: ICD-10-PCS | Mod: CPTII,S$GLB,, | Performed by: SURGERY

## 2023-02-14 PROCEDURE — 99024 PR POST-OP FOLLOW-UP VISIT: ICD-10-PCS | Mod: S$GLB,,, | Performed by: SURGERY

## 2023-02-14 PROCEDURE — 3074F PR MOST RECENT SYSTOLIC BLOOD PRESSURE < 130 MM HG: ICD-10-PCS | Mod: CPTII,S$GLB,, | Performed by: SURGERY

## 2023-02-14 PROCEDURE — 99024 POSTOP FOLLOW-UP VISIT: CPT | Mod: S$GLB,,, | Performed by: SURGERY

## 2023-02-14 PROCEDURE — 1160F RVW MEDS BY RX/DR IN RCRD: CPT | Mod: CPTII,S$GLB,, | Performed by: SURGERY

## 2023-02-14 PROCEDURE — 1159F PR MEDICATION LIST DOCUMENTED IN MEDICAL RECORD: ICD-10-PCS | Mod: CPTII,S$GLB,, | Performed by: SURGERY

## 2023-02-14 PROCEDURE — 3008F BODY MASS INDEX DOCD: CPT | Mod: CPTII,S$GLB,, | Performed by: SURGERY

## 2023-02-14 NOTE — PROGRESS NOTES
POST-OP NOTE    PROCEDURE:  Laparoscopic cholecystectomy    COMPLAINTS: None.    EXAM: Incision well approximated. No drainage. No infection.      IMPRESSION:  Doing well.      Final Pathologic Diagnosis Gallbladder, cholecystectomy:   - Chronic cholecystitis.   - Cholelithiasis.   - Cholesterolosis.   - One benign lymph node.     PLAN: FU as needed.

## 2023-02-16 ENCOUNTER — INFUSION (OUTPATIENT)
Dept: INFUSION THERAPY | Facility: HOSPITAL | Age: 23
End: 2023-02-16
Attending: INTERNAL MEDICINE
Payer: COMMERCIAL

## 2023-02-16 VITALS
RESPIRATION RATE: 18 BRPM | HEIGHT: 65 IN | OXYGEN SATURATION: 98 % | BODY MASS INDEX: 36.49 KG/M2 | WEIGHT: 219 LBS | DIASTOLIC BLOOD PRESSURE: 66 MMHG | SYSTOLIC BLOOD PRESSURE: 115 MMHG | TEMPERATURE: 98 F | HEART RATE: 82 BPM

## 2023-02-16 DIAGNOSIS — D50.0 IRON DEFICIENCY ANEMIA DUE TO CHRONIC BLOOD LOSS: Primary | ICD-10-CM

## 2023-02-16 PROCEDURE — 96365 THER/PROPH/DIAG IV INF INIT: CPT

## 2023-02-16 PROCEDURE — 63600175 PHARM REV CODE 636 W HCPCS: Performed by: INTERNAL MEDICINE

## 2023-02-16 PROCEDURE — 25000003 PHARM REV CODE 250: Performed by: INTERNAL MEDICINE

## 2023-02-16 PROCEDURE — A4216 STERILE WATER/SALINE, 10 ML: HCPCS | Performed by: INTERNAL MEDICINE

## 2023-02-16 RX ORDER — SODIUM CHLORIDE 0.9 % (FLUSH) 0.9 %
10 SYRINGE (ML) INJECTION
Status: DISCONTINUED | OUTPATIENT
Start: 2023-02-16 | End: 2023-02-16 | Stop reason: HOSPADM

## 2023-02-16 RX ORDER — HEPARIN 100 UNIT/ML
500 SYRINGE INTRAVENOUS
Status: CANCELLED | OUTPATIENT
Start: 2023-02-23

## 2023-02-16 RX ORDER — SODIUM CHLORIDE 0.9 % (FLUSH) 0.9 %
10 SYRINGE (ML) INJECTION
Status: CANCELLED | OUTPATIENT
Start: 2023-02-23

## 2023-02-16 RX ADMIN — IRON SUCROSE 100 MG: 20 INJECTION, SOLUTION INTRAVENOUS at 02:02

## 2023-02-16 RX ADMIN — SODIUM CHLORIDE, PRESERVATIVE FREE 10 ML: 5 INJECTION INTRAVENOUS at 02:02

## 2023-02-16 RX ADMIN — SODIUM CHLORIDE: 9 INJECTION, SOLUTION INTRAVENOUS at 03:02

## 2023-03-09 ENCOUNTER — INFUSION (OUTPATIENT)
Dept: INFUSION THERAPY | Facility: HOSPITAL | Age: 23
End: 2023-03-09
Attending: INTERNAL MEDICINE
Payer: COMMERCIAL

## 2023-03-09 VITALS
OXYGEN SATURATION: 99 % | HEIGHT: 65 IN | DIASTOLIC BLOOD PRESSURE: 61 MMHG | TEMPERATURE: 98 F | SYSTOLIC BLOOD PRESSURE: 104 MMHG | HEART RATE: 88 BPM | RESPIRATION RATE: 16 BRPM | WEIGHT: 227 LBS | BODY MASS INDEX: 37.82 KG/M2

## 2023-03-09 DIAGNOSIS — D50.0 IRON DEFICIENCY ANEMIA DUE TO CHRONIC BLOOD LOSS: Primary | ICD-10-CM

## 2023-03-09 PROCEDURE — 63600175 PHARM REV CODE 636 W HCPCS: Performed by: INTERNAL MEDICINE

## 2023-03-09 PROCEDURE — 96365 THER/PROPH/DIAG IV INF INIT: CPT

## 2023-03-09 PROCEDURE — 25000003 PHARM REV CODE 250: Performed by: INTERNAL MEDICINE

## 2023-03-09 RX ORDER — HEPARIN 100 UNIT/ML
500 SYRINGE INTRAVENOUS
Status: CANCELLED | OUTPATIENT
Start: 2023-03-16

## 2023-03-09 RX ORDER — SODIUM CHLORIDE 0.9 % (FLUSH) 0.9 %
10 SYRINGE (ML) INJECTION
Status: DISCONTINUED | OUTPATIENT
Start: 2023-03-09 | End: 2023-03-09 | Stop reason: HOSPADM

## 2023-03-09 RX ORDER — SODIUM CHLORIDE 0.9 % (FLUSH) 0.9 %
10 SYRINGE (ML) INJECTION
Status: CANCELLED | OUTPATIENT
Start: 2023-03-16

## 2023-03-09 RX ADMIN — SODIUM CHLORIDE: 0.9 INJECTION, SOLUTION INTRAVENOUS at 02:03

## 2023-03-09 RX ADMIN — IRON SUCROSE 100 MG: 20 INJECTION, SOLUTION INTRAVENOUS at 02:03

## 2023-03-09 NOTE — PLAN OF CARE
Problem: Anemia  Goal: Anemia Symptom Improvement  Outcome: Ongoing, Progressing  Intervention: Monitor and Manage Anemia  Flowsheets (Taken 3/9/2023 1402)  Oral Nutrition Promotion: safe use of adaptive equipment encouraged  Safety Promotion/Fall Prevention: instructed to call staff for mobility  Fatigue Management:   activity schedule adjusted   frequent rest breaks encouraged

## 2023-03-16 ENCOUNTER — TELEPHONE (OUTPATIENT)
Dept: INFUSION THERAPY | Facility: HOSPITAL | Age: 23
End: 2023-03-16

## 2023-03-23 ENCOUNTER — INFUSION (OUTPATIENT)
Dept: INFUSION THERAPY | Facility: HOSPITAL | Age: 23
End: 2023-03-23
Attending: INTERNAL MEDICINE
Payer: COMMERCIAL

## 2023-03-23 VITALS
HEART RATE: 76 BPM | WEIGHT: 227.13 LBS | SYSTOLIC BLOOD PRESSURE: 96 MMHG | BODY MASS INDEX: 37.84 KG/M2 | OXYGEN SATURATION: 100 % | RESPIRATION RATE: 18 BRPM | TEMPERATURE: 98 F | HEIGHT: 65 IN | DIASTOLIC BLOOD PRESSURE: 59 MMHG

## 2023-03-23 DIAGNOSIS — D50.0 IRON DEFICIENCY ANEMIA DUE TO CHRONIC BLOOD LOSS: Primary | ICD-10-CM

## 2023-03-23 PROCEDURE — 96365 THER/PROPH/DIAG IV INF INIT: CPT

## 2023-03-23 PROCEDURE — 25000003 PHARM REV CODE 250: Performed by: INTERNAL MEDICINE

## 2023-03-23 PROCEDURE — 63600175 PHARM REV CODE 636 W HCPCS: Performed by: INTERNAL MEDICINE

## 2023-03-23 RX ORDER — HEPARIN 100 UNIT/ML
500 SYRINGE INTRAVENOUS
Status: CANCELLED | OUTPATIENT
Start: 2023-03-30

## 2023-03-23 RX ORDER — SODIUM CHLORIDE 0.9 % (FLUSH) 0.9 %
10 SYRINGE (ML) INJECTION
Status: CANCELLED | OUTPATIENT
Start: 2023-03-30

## 2023-03-23 RX ORDER — SODIUM CHLORIDE 0.9 % (FLUSH) 0.9 %
10 SYRINGE (ML) INJECTION
Status: DISCONTINUED | OUTPATIENT
Start: 2023-03-23 | End: 2023-03-23 | Stop reason: HOSPADM

## 2023-03-23 RX ADMIN — IRON SUCROSE 100 MG: 20 INJECTION, SOLUTION INTRAVENOUS at 02:03

## 2023-03-23 RX ADMIN — SODIUM CHLORIDE: 0.9 INJECTION, SOLUTION INTRAVENOUS at 02:03

## 2023-03-23 NOTE — PLAN OF CARE
Problem: Fatigue  Goal: Improved Activity Tolerance  Outcome: Ongoing, Progressing  Intervention: Promote Improved Energy  Flowsheets (Taken 3/23/2023 1445)  Fatigue Management: frequent rest breaks encouraged

## 2023-03-30 ENCOUNTER — INFUSION (OUTPATIENT)
Dept: INFUSION THERAPY | Facility: HOSPITAL | Age: 23
End: 2023-03-30
Attending: INTERNAL MEDICINE
Payer: COMMERCIAL

## 2023-03-30 VITALS
HEIGHT: 65 IN | RESPIRATION RATE: 16 BRPM | OXYGEN SATURATION: 100 % | SYSTOLIC BLOOD PRESSURE: 107 MMHG | BODY MASS INDEX: 38.13 KG/M2 | DIASTOLIC BLOOD PRESSURE: 63 MMHG | WEIGHT: 228.88 LBS | TEMPERATURE: 98 F | HEART RATE: 65 BPM

## 2023-03-30 DIAGNOSIS — D50.0 IRON DEFICIENCY ANEMIA DUE TO CHRONIC BLOOD LOSS: Primary | ICD-10-CM

## 2023-03-30 PROCEDURE — 25000003 PHARM REV CODE 250: Performed by: INTERNAL MEDICINE

## 2023-03-30 PROCEDURE — A4216 STERILE WATER/SALINE, 10 ML: HCPCS | Performed by: INTERNAL MEDICINE

## 2023-03-30 PROCEDURE — 96365 THER/PROPH/DIAG IV INF INIT: CPT

## 2023-03-30 PROCEDURE — 63600175 PHARM REV CODE 636 W HCPCS: Performed by: INTERNAL MEDICINE

## 2023-03-30 RX ORDER — SODIUM CHLORIDE 0.9 % (FLUSH) 0.9 %
10 SYRINGE (ML) INJECTION
Status: DISCONTINUED | OUTPATIENT
Start: 2023-03-30 | End: 2023-03-30 | Stop reason: HOSPADM

## 2023-03-30 RX ORDER — HEPARIN 100 UNIT/ML
500 SYRINGE INTRAVENOUS
Status: CANCELLED | OUTPATIENT
Start: 2023-04-06

## 2023-03-30 RX ORDER — SODIUM CHLORIDE 0.9 % (FLUSH) 0.9 %
10 SYRINGE (ML) INJECTION
Status: CANCELLED | OUTPATIENT
Start: 2023-04-06

## 2023-03-30 RX ADMIN — SODIUM CHLORIDE, PRESERVATIVE FREE 10 ML: 5 INJECTION INTRAVENOUS at 04:03

## 2023-03-30 RX ADMIN — IRON SUCROSE 100 MG: 20 INJECTION, SOLUTION INTRAVENOUS at 02:03

## 2023-03-30 RX ADMIN — SODIUM CHLORIDE: 0.9 INJECTION, SOLUTION INTRAVENOUS at 02:03

## 2023-03-30 NOTE — PLAN OF CARE
Problem: Anemia  Goal: Anemia Symptom Improvement  Outcome: Ongoing, Progressing  Intervention: Monitor and Manage Anemia  Flowsheets (Taken 3/30/2023 2844)  Oral Nutrition Promotion: rest periods promoted  Safety Promotion/Fall Prevention: medications reviewed  Fatigue Management: frequent rest breaks encouraged

## 2023-04-13 ENCOUNTER — INFUSION (OUTPATIENT)
Dept: INFUSION THERAPY | Facility: HOSPITAL | Age: 23
End: 2023-04-13
Attending: INTERNAL MEDICINE
Payer: COMMERCIAL

## 2023-04-13 VITALS
SYSTOLIC BLOOD PRESSURE: 99 MMHG | WEIGHT: 226.88 LBS | RESPIRATION RATE: 16 BRPM | BODY MASS INDEX: 37.8 KG/M2 | DIASTOLIC BLOOD PRESSURE: 52 MMHG | HEART RATE: 67 BPM | OXYGEN SATURATION: 100 % | HEIGHT: 65 IN | TEMPERATURE: 97 F

## 2023-04-13 DIAGNOSIS — D50.0 IRON DEFICIENCY ANEMIA DUE TO CHRONIC BLOOD LOSS: Primary | ICD-10-CM

## 2023-04-13 PROCEDURE — 96365 THER/PROPH/DIAG IV INF INIT: CPT

## 2023-04-13 PROCEDURE — 63600175 PHARM REV CODE 636 W HCPCS: Performed by: INTERNAL MEDICINE

## 2023-04-13 PROCEDURE — 25000003 PHARM REV CODE 250: Performed by: INTERNAL MEDICINE

## 2023-04-13 RX ORDER — SODIUM CHLORIDE 0.9 % (FLUSH) 0.9 %
10 SYRINGE (ML) INJECTION
OUTPATIENT
Start: 2023-04-20

## 2023-04-13 RX ORDER — SODIUM CHLORIDE 0.9 % (FLUSH) 0.9 %
10 SYRINGE (ML) INJECTION
Status: DISCONTINUED | OUTPATIENT
Start: 2023-04-13 | End: 2023-04-13 | Stop reason: HOSPADM

## 2023-04-13 RX ORDER — HEPARIN 100 UNIT/ML
500 SYRINGE INTRAVENOUS
OUTPATIENT
Start: 2023-04-20

## 2023-04-13 RX ADMIN — IRON SUCROSE 100 MG: 20 INJECTION, SOLUTION INTRAVENOUS at 02:04

## 2023-04-13 RX ADMIN — SODIUM CHLORIDE: 0.9 INJECTION, SOLUTION INTRAVENOUS at 02:04

## 2023-04-13 NOTE — PLAN OF CARE
Problem: Anemia  Goal: Anemia Symptom Improvement  Outcome: Ongoing, Progressing  Intervention: Monitor and Manage Anemia  Flowsheets (Taken 4/13/2023 1422)  Oral Nutrition Promotion: safe use of adaptive equipment encouraged  Safety Promotion/Fall Prevention: instructed to call staff for mobility  Fatigue Management:   activity schedule adjusted   frequent rest breaks encouraged

## 2023-06-21 ENCOUNTER — OFFICE VISIT (OUTPATIENT)
Dept: URGENT CARE | Facility: CLINIC | Age: 23
End: 2023-06-21
Payer: COMMERCIAL

## 2023-06-21 VITALS
OXYGEN SATURATION: 100 % | RESPIRATION RATE: 16 BRPM | WEIGHT: 235 LBS | BODY MASS INDEX: 37.77 KG/M2 | TEMPERATURE: 101 F | HEART RATE: 121 BPM | DIASTOLIC BLOOD PRESSURE: 72 MMHG | HEIGHT: 66 IN | SYSTOLIC BLOOD PRESSURE: 109 MMHG

## 2023-06-21 DIAGNOSIS — R50.9 FEVER, UNSPECIFIED FEVER CAUSE: ICD-10-CM

## 2023-06-21 DIAGNOSIS — Z20.822 COVID-19 VIRUS NOT DETECTED: ICD-10-CM

## 2023-06-21 DIAGNOSIS — J06.9 VIRAL URI WITH COUGH: Primary | ICD-10-CM

## 2023-06-21 LAB
CTP QC/QA: YES
CTP QC/QA: YES
FLUAV AG NPH QL: NEGATIVE
FLUBV AG NPH QL: NEGATIVE
SARS-COV-2 AG RESP QL IA.RAPID: NEGATIVE

## 2023-06-21 PROCEDURE — 99214 PR OFFICE/OUTPT VISIT, EST, LEVL IV, 30-39 MIN: ICD-10-PCS | Mod: S$GLB,,, | Performed by: NURSE PRACTITIONER

## 2023-06-21 PROCEDURE — 87804 POCT INFLUENZA A/B: ICD-10-PCS | Mod: 59,QW,, | Performed by: NURSE PRACTITIONER

## 2023-06-21 PROCEDURE — 99214 OFFICE O/P EST MOD 30 MIN: CPT | Mod: S$GLB,,, | Performed by: NURSE PRACTITIONER

## 2023-06-21 PROCEDURE — 87811 SARS-COV-2 COVID19 W/OPTIC: CPT | Mod: QW,S$GLB,, | Performed by: NURSE PRACTITIONER

## 2023-06-21 PROCEDURE — 87811 SARS CORONAVIRUS 2 ANTIGEN POCT, MANUAL READ: ICD-10-PCS | Mod: QW,S$GLB,, | Performed by: NURSE PRACTITIONER

## 2023-06-21 PROCEDURE — 87804 INFLUENZA ASSAY W/OPTIC: CPT | Mod: 59,QW,, | Performed by: NURSE PRACTITIONER

## 2023-06-21 RX ORDER — ACETAMINOPHEN 325 MG/1
650 TABLET ORAL
Status: COMPLETED | OUTPATIENT
Start: 2023-06-21 | End: 2023-06-21

## 2023-06-21 RX ORDER — AZELASTINE 1 MG/ML
1 SPRAY, METERED NASAL 2 TIMES DAILY PRN
Qty: 30 ML | Refills: 0 | Status: SHIPPED | OUTPATIENT
Start: 2023-06-21 | End: 2024-03-05

## 2023-06-21 RX ORDER — BROMPHENIRAM/PHENYLEPHRINE/DM 1-2.5-5/5
20 SOLUTION, ORAL ORAL EVERY 4 HOURS PRN
Qty: 118 ML | Refills: 0 | Status: SHIPPED | OUTPATIENT
Start: 2023-06-21 | End: 2023-06-28

## 2023-06-21 RX ORDER — GUAIFENESIN AND DEXTROMETHORPHAN HYDROBROMIDE 20; 400 MG/1; MG/1
1 TABLET ORAL EVERY 4 HOURS PRN
Qty: 30 EACH | Refills: 0 | Status: SHIPPED | OUTPATIENT
Start: 2023-06-21 | End: 2023-07-01

## 2023-06-21 RX ADMIN — ACETAMINOPHEN 650 MG: 325 TABLET ORAL at 11:06

## 2023-06-21 NOTE — PATIENT INSTRUCTIONS
Increase clear fluid intake  Stop all current over the counter cough, cold, flu medicine  Tylenol/motrin otc for fever or pain-alternate these every 4 hours for close control of fever and pain  Start Astelin nasal spray and Dimetapp syrup for sinus congestion and pressure.  Take Mucinex DM as needed for chest congestion and coughing. Take mucinex with a full glass of water at each dose  Add a humidifier to your room at bedtime for respiratory comfort.  Saltwater gargles 4 x daily and benzocaine anesthetic throat lozenges for sore throat. May also add honey based cough syrup  Follow up with PCP  Go immediately to the nearest emergency room for shortness of breath, chest pain,  or other emergent concern.  Return to clinic for new, worse, or unresolving symptoms

## 2023-06-21 NOTE — PROGRESS NOTES
"Subjective:      Patient ID: Ricardo Rivera is a 22 y.o. female.    Vitals:  height is 5' 6" (1.676 m) and weight is 106.6 kg (235 lb). Her oral temperature is 100.5 °F (38.1 °C) (abnormal). Her blood pressure is 109/72 and her pulse is 121 (abnormal). Her respiration is 16 and oxygen saturation is 100%.     Chief Complaint: Sinus Problem    Sinus Problem  This is a new problem. The current episode started yesterday. The problem has been gradually worsening since onset. There has been no fever. Associated symptoms include chills, congestion, coughing, headaches and a sore throat. Pertinent negatives include no ear pain, shortness of breath or sinus pressure. (Dizziness  ) Past treatments include acetaminophen. The treatment provided no relief.     Constitution: Positive for chills and fever.   HENT:  Positive for congestion and sore throat. Negative for ear pain, sinus pressure, trouble swallowing and voice change.    Neck: Negative for painful lymph nodes.   Cardiovascular:  Negative for chest pain, palpitations and sob on exertion.   Respiratory:  Positive for cough and sputum production. Negative for shortness of breath and stridor.    Gastrointestinal:  Negative for abdominal pain, nausea and vomiting.   Genitourinary:  Negative for dysuria, frequency and urgency.   Skin:  Negative for rash.   Neurological:  Positive for headaches. Negative for dizziness, light-headedness, passing out, disorientation and altered mental status.   Hematologic/Lymphatic: Negative for swollen lymph nodes.   Psychiatric/Behavioral:  Negative for altered mental status, disorientation and confusion.     Objective:     Physical Exam   Constitutional: She is oriented to person, place, and time. She appears well-developed. She is cooperative.  Non-toxic appearance. She does not appear ill. No distress.   HENT:   Head: Normocephalic and atraumatic.   Ears:   Right Ear: Hearing and external ear normal.   Left Ear: Hearing and " external ear normal.   Nose: Mucosal edema, rhinorrhea and congestion present. No nasal deformity. No epistaxis. Right sinus exhibits no maxillary sinus tenderness and no frontal sinus tenderness. Left sinus exhibits no maxillary sinus tenderness and no frontal sinus tenderness.   Mouth/Throat: Uvula is midline and mucous membranes are normal. Mucous membranes are moist. No trismus in the jaw. Normal dentition. No uvula swelling. Posterior oropharyngeal erythema present. No oropharyngeal exudate, posterior oropharyngeal edema, tonsillar abscesses or cobblestoning. Tonsils are 1+ on the right. Tonsils are 1+ on the left. No tonsillar exudate.   Eyes: Conjunctivae and lids are normal. No scleral icterus.   Neck: Trachea normal and phonation normal. Neck supple. No edema present. No erythema present. No neck rigidity present.   Cardiovascular: Normal rate, regular rhythm, normal heart sounds and normal pulses.   Pulmonary/Chest: Effort normal and breath sounds normal. No stridor. No respiratory distress. She has no decreased breath sounds. She has no rhonchi.   Abdominal: Normal appearance.   Musculoskeletal: Normal range of motion.         General: No deformity. Normal range of motion.   Lymphadenopathy:     She has no cervical adenopathy.   Neurological: no focal deficit. She is alert and oriented to person, place, and time. She exhibits normal muscle tone. Coordination normal.   Skin: Skin is warm, dry, intact, not diaphoretic and not pale. Capillary refill takes 2 to 3 seconds.   Psychiatric: Her speech is normal and behavior is normal. Judgment and thought content normal.   Nursing note and vitals reviewed.    Assessment:     1. Viral URI with cough    2. Fever, unspecified fever cause    3. COVID-19 virus not detected        Plan:       Viral URI with cough  -     POCT Influenza A/B  -     azelastine (ASTELIN) 137 mcg (0.1 %) nasal spray; 1 spray (137 mcg total) by Nasal route 2 (two) times daily as needed for  Rhinitis.  Dispense: 30 mL; Refill: 0  -     benzocaine-menthoL 6-10 mg lozenge; Take 1 lozenge by mouth every 2 (two) hours as needed (Sore Throat).  Dispense: 18 tablet; Refill: 0  -     brompheniramine-phenylephrine (DIMETAPP COLD-ALLERGY, PE,) 1-2.5 mg/5 mL Soln; Take 20 mLs by mouth every 4 (four) hours as needed (sinus congestion).  Dispense: 118 mL; Refill: 0  -     dextromethorphan-guaiFENesin  mg Tab; Take 1 tablet by mouth every 4 (four) hours as needed (cough/congestion).  Dispense: 30 each; Refill: 0    Fever, unspecified fever cause  -     SARS Coronavirus 2 Antigen, POCT Manual Read  -     acetaminophen tablet 650 mg  -     POCT Influenza A/B    COVID-19 virus not detected      Influenza negative     I have discussed the test results and physical exam findings with the patient. We discussed symptom monitoring, conservative care methods, medication use, and follow up orders. The patient verbalized understanding and agreement with the plan of care.

## 2023-07-19 ENCOUNTER — LAB VISIT (OUTPATIENT)
Dept: LAB | Facility: HOSPITAL | Age: 23
End: 2023-07-19
Attending: INTERNAL MEDICINE
Payer: COMMERCIAL

## 2023-07-19 ENCOUNTER — OFFICE VISIT (OUTPATIENT)
Dept: HEMATOLOGY/ONCOLOGY | Facility: CLINIC | Age: 23
End: 2023-07-19
Payer: COMMERCIAL

## 2023-07-19 ENCOUNTER — TELEPHONE (OUTPATIENT)
Dept: HEMATOLOGY/ONCOLOGY | Facility: CLINIC | Age: 23
End: 2023-07-19

## 2023-07-19 VITALS
HEIGHT: 66 IN | WEIGHT: 238 LBS | HEART RATE: 85 BPM | DIASTOLIC BLOOD PRESSURE: 51 MMHG | BODY MASS INDEX: 38.25 KG/M2 | TEMPERATURE: 98 F | RESPIRATION RATE: 16 BRPM | SYSTOLIC BLOOD PRESSURE: 95 MMHG

## 2023-07-19 DIAGNOSIS — D50.0 IRON DEFICIENCY ANEMIA DUE TO CHRONIC BLOOD LOSS: ICD-10-CM

## 2023-07-19 DIAGNOSIS — D50.0 IRON DEFICIENCY ANEMIA DUE TO CHRONIC BLOOD LOSS: Primary | ICD-10-CM

## 2023-07-19 LAB
BASOPHILS # BLD AUTO: 0.06 K/UL (ref 0–0.2)
BASOPHILS NFR BLD: 0.8 % (ref 0–1.9)
DIFFERENTIAL METHOD: ABNORMAL
EOSINOPHIL # BLD AUTO: 0.2 K/UL (ref 0–0.5)
EOSINOPHIL NFR BLD: 2.1 % (ref 0–8)
ERYTHROCYTE [DISTWIDTH] IN BLOOD BY AUTOMATED COUNT: 17.3 % (ref 11.5–14.5)
FERRITIN SERPL-MCNC: 3 NG/ML (ref 20–300)
HCT VFR BLD AUTO: 32.1 % (ref 37–48.5)
HGB BLD-MCNC: 9.2 G/DL (ref 12–16)
IMM GRANULOCYTES # BLD AUTO: 0.04 K/UL (ref 0–0.04)
IMM GRANULOCYTES NFR BLD AUTO: 0.5 % (ref 0–0.5)
LYMPHOCYTES # BLD AUTO: 2.1 K/UL (ref 1–4.8)
LYMPHOCYTES NFR BLD: 27.7 % (ref 18–48)
MCH RBC QN AUTO: 20.9 PG (ref 27–31)
MCHC RBC AUTO-ENTMCNC: 28.7 G/DL (ref 32–36)
MCV RBC AUTO: 73 FL (ref 82–98)
MONOCYTES # BLD AUTO: 0.5 K/UL (ref 0.3–1)
MONOCYTES NFR BLD: 6 % (ref 4–15)
NEUTROPHILS # BLD AUTO: 4.8 K/UL (ref 1.8–7.7)
NEUTROPHILS NFR BLD: 62.9 % (ref 38–73)
NRBC BLD-RTO: 0 /100 WBC
PLATELET # BLD AUTO: 402 K/UL (ref 150–450)
PMV BLD AUTO: 9.6 FL (ref 9.2–12.9)
RBC # BLD AUTO: 4.41 M/UL (ref 4–5.4)
WBC # BLD AUTO: 7.63 K/UL (ref 3.9–12.7)

## 2023-07-19 PROCEDURE — 3078F DIAST BP <80 MM HG: CPT | Mod: CPTII,S$GLB,, | Performed by: INTERNAL MEDICINE

## 2023-07-19 PROCEDURE — 1160F RVW MEDS BY RX/DR IN RCRD: CPT | Mod: CPTII,S$GLB,, | Performed by: INTERNAL MEDICINE

## 2023-07-19 PROCEDURE — 1160F PR REVIEW ALL MEDS BY PRESCRIBER/CLIN PHARMACIST DOCUMENTED: ICD-10-PCS | Mod: CPTII,S$GLB,, | Performed by: INTERNAL MEDICINE

## 2023-07-19 PROCEDURE — 3074F SYST BP LT 130 MM HG: CPT | Mod: CPTII,S$GLB,, | Performed by: INTERNAL MEDICINE

## 2023-07-19 PROCEDURE — 36415 COLL VENOUS BLD VENIPUNCTURE: CPT | Performed by: INTERNAL MEDICINE

## 2023-07-19 PROCEDURE — 1159F MED LIST DOCD IN RCRD: CPT | Mod: CPTII,S$GLB,, | Performed by: INTERNAL MEDICINE

## 2023-07-19 PROCEDURE — 99213 OFFICE O/P EST LOW 20 MIN: CPT | Mod: S$GLB,,, | Performed by: INTERNAL MEDICINE

## 2023-07-19 PROCEDURE — 85025 COMPLETE CBC W/AUTO DIFF WBC: CPT | Performed by: INTERNAL MEDICINE

## 2023-07-19 PROCEDURE — 1159F PR MEDICATION LIST DOCUMENTED IN MEDICAL RECORD: ICD-10-PCS | Mod: CPTII,S$GLB,, | Performed by: INTERNAL MEDICINE

## 2023-07-19 PROCEDURE — 3078F PR MOST RECENT DIASTOLIC BLOOD PRESSURE < 80 MM HG: ICD-10-PCS | Mod: CPTII,S$GLB,, | Performed by: INTERNAL MEDICINE

## 2023-07-19 PROCEDURE — 3008F PR BODY MASS INDEX (BMI) DOCUMENTED: ICD-10-PCS | Mod: CPTII,S$GLB,, | Performed by: INTERNAL MEDICINE

## 2023-07-19 PROCEDURE — 3074F PR MOST RECENT SYSTOLIC BLOOD PRESSURE < 130 MM HG: ICD-10-PCS | Mod: CPTII,S$GLB,, | Performed by: INTERNAL MEDICINE

## 2023-07-19 PROCEDURE — 3008F BODY MASS INDEX DOCD: CPT | Mod: CPTII,S$GLB,, | Performed by: INTERNAL MEDICINE

## 2023-07-19 PROCEDURE — 99213 PR OFFICE/OUTPT VISIT, EST, LEVL III, 20-29 MIN: ICD-10-PCS | Mod: S$GLB,,, | Performed by: INTERNAL MEDICINE

## 2023-07-19 PROCEDURE — 82728 ASSAY OF FERRITIN: CPT | Performed by: INTERNAL MEDICINE

## 2023-07-19 NOTE — TELEPHONE ENCOUNTER
----- Message from Frandy Whitehead MD sent at 7/19/2023  3:40 PM CDT -----  Call patient , she is still very low on iron but Hb better.  Recheck CBC and ferritin in one month.  Get orders in.

## 2023-07-19 NOTE — PROGRESS NOTES
PROGRESS NOTE    Subjective:       Patient ID: Ricardo Rivera is a 22 y.o. female.    10/25/2021:  CTA chest:  Extensive bilateral PE   Thought due to OCPs  Eliquis given for 4 months.     PLAN as of 1/19/2022: IV iron weekly x 8, weekly CBC and will need choly when above 10    2/2/2023:  Choly done-path c/w chronic cholecystitis     4/13/2023:  Completed IV iron x 8.     Chief Complaint:  No chief complaint on file.  Iron def anemia and history of PE    History of Present Illness:   Ricardo Rivera is a 22 y.o. female who presents for follow of of above.      Patient is doing well. Had choly which has corrected her symptoms.      No new complaints.       PMH:  Patient diagnosed with extensive PE October 2021.  This was the only clotting problem she has had and this was attributed to use of birth control at that time. [place]oke Eliquis for 4 months.      PMH-12/23/2022  Ms. Rivera is a 21yo female who was diagnosed with iron deficiency anemia at 15 years of age.  She was seeing pediatric oncology at [facility] since 15 years old.  This was attributed to heavy menses and she was placed on birth control.  She unfortunately diagnosed with pulmonary embolism in 2021 and this was discontinued.   She has historically been treated with iron supplementation with oral iron but this makes her very constipated so she has discontinued this.  She has been treated with IV iron on 3 different occasions and has not had any allergic issues with this.         Family and Social history reviewed and is unchanged from 12/23/2022      ROS:  Review of Systems   Constitutional:  Negative for fever.   Respiratory:  Negative for shortness of breath.    Cardiovascular:  Negative for chest pain and leg swelling.   Gastrointestinal:  Negative for abdominal pain and blood in stool.   Genitourinary:  Negative for hematuria.   Skin:  Negative for rash.        Current  "Outpatient Medications:     azelastine (ASTELIN) 137 mcg (0.1 %) nasal spray, 1 spray (137 mcg total) by Nasal route 2 (two) times daily as needed for Rhinitis., Disp: 30 mL, Rfl: 0    benzocaine-menthoL 6-10 mg lozenge, Take 1 lozenge by mouth every 2 (two) hours as needed (Sore Throat). (Patient not taking: Reported on 7/19/2023), Disp: 18 tablet, Rfl: 0    HYDROcodone-acetaminophen (NORCO) 5-325 mg per tablet, Take 1 tablet by mouth every 6 (six) hours as needed for Pain. (Patient not taking: Reported on 2/8/2023), Disp: 20 tablet, Rfl: 0    HYDROcodone-acetaminophen (NORCO) 5-325 mg per tablet, Take 1 tablet by mouth every 6 (six) hours as needed for Pain. (Patient not taking: Reported on 2/8/2023), Disp: 20 tablet, Rfl: 0  No current facility-administered medications for this visit.    Facility-Administered Medications Ordered in Other Visits:     electrolyte-S (ISOLYTE), , Intravenous, Continuous, Steven Cooper MD, Stopped at 02/02/23 1010    lactated ringers infusion, , Intravenous, Continuous, Steven Cooper MD    LIDOcaine (PF) 10 mg/ml (1%) injection 10 mg, 1 mL, Intradermal, Once, Steven Cooper MD        Objective:       Physical Examination:     BP (!) 95/51   Pulse 85   Temp 98 °F (36.7 °C)   Resp 16   Ht 5' 6" (1.676 m)   Wt 108 kg (238 lb)   LMP 06/10/2023   BMI 38.41 kg/m²     Physical Exam  Constitutional:       Appearance: She is well-developed.   HENT:      Head: Normocephalic and atraumatic.      Right Ear: External ear normal.      Left Ear: External ear normal.   Eyes:      Conjunctiva/sclera: Conjunctivae normal.      Pupils: Pupils are equal, round, and reactive to light.   Neck:      Thyroid: No thyromegaly.      Trachea: No tracheal deviation.   Cardiovascular:      Rate and Rhythm: Normal rate and regular rhythm.      Heart sounds: Normal heart sounds.   Pulmonary:      Effort: Pulmonary effort is normal.      Breath sounds: Normal breath sounds.   Abdominal:      " General: Bowel sounds are normal. There is no distension.      Palpations: Abdomen is soft. There is no mass.      Tenderness: There is no abdominal tenderness.   Skin:     Findings: No rash.   Neurological:      Comments: Neuro intact througout   Psychiatric:         Behavior: Behavior normal.         Thought Content: Thought content normal.         Judgment: Judgment normal.       Labs:   No results found for this or any previous visit (from the past 336 hour(s)).    CMP  Sodium   Date Value Ref Range Status   01/31/2023 141 136 - 145 mmol/L Final     Potassium   Date Value Ref Range Status   01/31/2023 4.1 3.5 - 5.1 mmol/L Final     Chloride   Date Value Ref Range Status   01/31/2023 111 (H) 95 - 110 mmol/L Final     CO2   Date Value Ref Range Status   01/31/2023 22 (L) 23 - 29 mmol/L Final     Glucose   Date Value Ref Range Status   01/31/2023 81 70 - 110 mg/dL Final     BUN   Date Value Ref Range Status   01/31/2023 8 6 - 20 mg/dL Final     Creatinine   Date Value Ref Range Status   01/31/2023 0.7 0.5 - 1.4 mg/dL Final     Calcium   Date Value Ref Range Status   01/31/2023 9.5 8.7 - 10.5 mg/dL Final     Total Protein   Date Value Ref Range Status   01/31/2023 7.0 6.0 - 8.4 g/dL Final     Albumin   Date Value Ref Range Status   01/31/2023 4.1 3.5 - 5.2 g/dL Final     Total Bilirubin   Date Value Ref Range Status   01/31/2023 0.8 0.1 - 1.0 mg/dL Final     Comment:     For infants and newborns, interpretation of results should be based  on gestational age, weight and in agreement with clinical  observations.    Premature Infant recommended reference ranges:  Up to 24 hours.............<8.0 mg/dL  Up to 48 hours............<12.0 mg/dL  3-5 days..................<15.0 mg/dL  6-29 days.................<15.0 mg/dL       Alkaline Phosphatase   Date Value Ref Range Status   01/31/2023 64 55 - 135 U/L Final     AST   Date Value Ref Range Status   01/31/2023 17 10 - 40 U/L Final     ALT   Date Value Ref Range Status    01/31/2023 18 10 - 44 U/L Final     Anion Gap   Date Value Ref Range Status   01/31/2023 8 8 - 16 mmol/L Final     eGFR if    Date Value Ref Range Status   10/27/2021 >60 >60 mL/min/1.73 m^2 Final     eGFR if non    Date Value Ref Range Status   10/27/2021 >60 >60 mL/min/1.73 m^2 Final     Comment:     Calculation used to obtain the estimated glomerular filtration  rate (eGFR) is the CKD-EPI equation.        No results found for: CEA  No results found for: PSA        Assessment/Plan:     Problem List Items Addressed This Visit       Iron deficiency anemia due to chronic blood loss     Patient is doing well and is s/p IV iron therapy and gallbladder surgery.  Will check her labs again now and plan to see her again in six months for ongoing monitoring of her anemia.  Discussed this today.          Relevant Orders    CBC Auto Differential    Ferritin     Discussion:     Follow up in about 6 months (around 1/19/2024).      Electronically signed by Frandy Flores

## 2023-07-19 NOTE — TELEPHONE ENCOUNTER
Left VM notifying pt that her iron is still low, but hgb is better. Per Dr. Whitehead, have CBC and ferritin re-checked in one month. Advised to call back with any additional questions.

## 2023-07-21 ENCOUNTER — TELEPHONE (OUTPATIENT)
Dept: HEMATOLOGY/ONCOLOGY | Facility: CLINIC | Age: 23
End: 2023-07-21

## 2023-07-21 NOTE — TELEPHONE ENCOUNTER
----- Message from Tatiana Sam sent at 7/19/2023  3:50 PM CDT -----  Pt returning your call.      [phone]

## 2023-07-21 NOTE — TELEPHONE ENCOUNTER
Spoke to pt regarding labs. Per Dr. Whitehead, have them re-checked in one month. Pt verbalized understanding.

## 2023-11-09 ENCOUNTER — LAB VISIT (OUTPATIENT)
Dept: LAB | Facility: HOSPITAL | Age: 23
End: 2023-11-09
Attending: SPECIALIST
Payer: COMMERCIAL

## 2023-11-09 ENCOUNTER — OFFICE VISIT (OUTPATIENT)
Dept: OBSTETRICS AND GYNECOLOGY | Facility: CLINIC | Age: 23
End: 2023-11-09
Payer: COMMERCIAL

## 2023-11-09 VITALS — HEIGHT: 66 IN | BODY MASS INDEX: 40.39 KG/M2 | WEIGHT: 251.31 LBS

## 2023-11-09 DIAGNOSIS — Z12.4 PAP SMEAR FOR CERVICAL CANCER SCREENING: ICD-10-CM

## 2023-11-09 DIAGNOSIS — N91.0 DELAYED MENSES: Primary | ICD-10-CM

## 2023-11-09 DIAGNOSIS — Z34.90 EARLY STAGE OF PREGNANCY: ICD-10-CM

## 2023-11-09 DIAGNOSIS — Z11.3 SCREEN FOR STD (SEXUALLY TRANSMITTED DISEASE): ICD-10-CM

## 2023-11-09 LAB
B-HCG UR QL: POSITIVE
CTP QC/QA: YES
HCG INTACT+B SERPL-ACNC: 49 MIU/ML
PROGEST SERPL-MCNC: 1.7 NG/ML

## 2023-11-09 PROCEDURE — 99213 PR OFFICE/OUTPT VISIT, EST, LEVL III, 20-29 MIN: ICD-10-PCS | Mod: S$GLB,,, | Performed by: SPECIALIST

## 2023-11-09 PROCEDURE — 99999 PR PBB SHADOW E&M-EST. PATIENT-LVL III: ICD-10-PCS | Mod: PBBFAC,,, | Performed by: SPECIALIST

## 2023-11-09 PROCEDURE — 84144 ASSAY OF PROGESTERONE: CPT | Performed by: SPECIALIST

## 2023-11-09 PROCEDURE — 84702 CHORIONIC GONADOTROPIN TEST: CPT | Performed by: SPECIALIST

## 2023-11-09 PROCEDURE — 1159F MED LIST DOCD IN RCRD: CPT | Mod: CPTII,S$GLB,, | Performed by: SPECIALIST

## 2023-11-09 PROCEDURE — 3008F BODY MASS INDEX DOCD: CPT | Mod: CPTII,S$GLB,, | Performed by: SPECIALIST

## 2023-11-09 PROCEDURE — 3008F PR BODY MASS INDEX (BMI) DOCUMENTED: ICD-10-PCS | Mod: CPTII,S$GLB,, | Performed by: SPECIALIST

## 2023-11-09 PROCEDURE — 81025 URINE PREGNANCY TEST: CPT | Mod: S$GLB,,, | Performed by: SPECIALIST

## 2023-11-09 PROCEDURE — 81025 POCT URINE PREGNANCY: ICD-10-PCS | Mod: S$GLB,,, | Performed by: SPECIALIST

## 2023-11-09 PROCEDURE — 36415 COLL VENOUS BLD VENIPUNCTURE: CPT | Mod: PO | Performed by: SPECIALIST

## 2023-11-09 PROCEDURE — 88142 CYTOPATH C/V THIN LAYER: CPT | Performed by: SPECIALIST

## 2023-11-09 PROCEDURE — 87491 CHLMYD TRACH DNA AMP PROBE: CPT | Performed by: SPECIALIST

## 2023-11-09 PROCEDURE — 1159F PR MEDICATION LIST DOCUMENTED IN MEDICAL RECORD: ICD-10-PCS | Mod: CPTII,S$GLB,, | Performed by: SPECIALIST

## 2023-11-09 PROCEDURE — 99999 PR PBB SHADOW E&M-EST. PATIENT-LVL III: CPT | Mod: PBBFAC,,, | Performed by: SPECIALIST

## 2023-11-09 PROCEDURE — 99213 OFFICE O/P EST LOW 20 MIN: CPT | Mod: S$GLB,,, | Performed by: SPECIALIST

## 2023-11-09 NOTE — PROGRESS NOTES
24 yo WF , previous  x 1 presents for Pos UPT LMP  Denies bleeding, pain, f/c, n/v  Past Medical History:   Diagnosis Date    Iron deficiency anemia 2018       Past Surgical History:   Procedure Laterality Date     SECTION N/A 2021    Procedure:  SECTION;  Surgeon: Rafael Rodriguez MD;  Location: [facility] L&D;  Service: OB/GYN;  Laterality: N/A;    LAPAROSCOPIC CHOLECYSTECTOMY N/A 2023    Procedure: CHOLECYSTECTOMY, LAPAROSCOPIC;  Surgeon: Trav Thomson MD;  Location: [facility] OR;  Service: General;  Laterality: N/A;       Family History   Problem Relation Age of Onset    No Known Problems Mother     No Known Problems Father     Breast cancer Neg Hx     Ovarian cancer Neg Hx        Social History     Socioeconomic History    Marital status: Single   Tobacco Use    Smoking status: Every Day     Types: Vaping with nicotine    Smokeless tobacco: Never   Substance and Sexual Activity    Alcohol use: No    Drug use: No    Sexual activity: Yes     Partners: Male     Birth control/protection: None     Social Determinants of Health     Stress: Stress Concern Present (2019)    [employer] - Occupational Stress Questionnaire     Feeling of Stress : To some extent       Current Outpatient Medications   Medication Sig Dispense Refill    azelastine (ASTELIN) 137 mcg (0.1 %) nasal spray 1 spray (137 mcg total) by Nasal route 2 (two) times daily as needed for Rhinitis. (Patient not taking: Reported on 2023) 30 mL 0     No current facility-administered medications for this visit.     Facility-Administered Medications Ordered in Other Visits   Medication Dose Route Frequency Provider Last Rate Last Admin    electrolyte-S (ISOLYTE)   Intravenous Continuous Steven Cooper MD   Stopped at 23 1010    lactated ringers infusion   Intravenous Continuous Steven Cooper MD        LIDOcaine (PF) 10 mg/ml (1%) injection 10 mg  1 mL Intradermal  Once Steven Cooper MD           Review of patient's allergies indicates:  No Known Allergies    Review of System:   General: no chills, fever, night sweats, weight gain or weight loss  Psychological: no depression or suicidal ideation  Breasts: no new or changing breast lumps, nipple discharge or masses.  Respiratory: no cough, shortness of breath, or wheezing  Cardiovascular: no chest pain or dyspnea on exertion  Gastrointestinal: no abdominal pain, change in bowel habits, or black or bloody stools  Genito-Urinary: no incontinence, urinary frequency/urgency or vulvar/vaginal symptoms, pelvic pain or abnormal vaginal bleeding.  Musculoskeletal: no gait disturbance or muscular weakness                                               General Appearance    A and O x 4, Cooperative, no distress   Breasts    Abdomen   Deferred    Soft, non-tender, bowel sounds active all four quadrants,  no masses, no organomegaly    Genitourinary:   External rectal exam shows no thrombosed external hemorrhoids.   Pelvic exam was performed with patient supine.  No labial fusion.  There is no rash, lesion or injury on the right labia.  There is no rash, lesion or injury on the left labia.  No bleeding and no signs of injury around the vaginal introitus, urethra is without lesions and well supported. The cervix is visualized with no discharge, lesions or friability.  No vaginal discharge found.  No significant Cystocele, Enterocele or rectocele, and uterus well supported.  Bimanual exam:  The urethra is normal to palpation and there are no palpable vaginal wall masses.  Uterus is not deviated, not enlarged, not fixed, normal shape and not tender.  Cervix exhibits no motion tenderness.   Right adnexum displays no mass and no tenderness.  Left adnexum displays no mass and no tenderness.   Extremities: Extremities normal, atraumatic, no cyanosis or edema                     NOTE  NURSING PERSONAL PRESENT FOR ENTIRE PHYSICAL EXAM     Faint  positive UPT  Will thus obtain quant BHCG and prog and desmond  Answered all questions    I spent a total of 30 minutes on the day of the visit. This includes face to face time and non-face to face time preparing to see the patient (eg, review of tests), obtaining and/or reviewing separately obtained history, documenting clinical information in the electronic or other health record, independently interpreting results and communicating results to the patient/family/caregiver, or care coordinator.

## 2023-11-10 ENCOUNTER — PATIENT MESSAGE (OUTPATIENT)
Dept: OBSTETRICS AND GYNECOLOGY | Facility: CLINIC | Age: 23
End: 2023-11-10
Payer: COMMERCIAL

## 2023-11-11 ENCOUNTER — HOSPITAL ENCOUNTER (EMERGENCY)
Facility: HOSPITAL | Age: 23
Discharge: HOME | End: 2023-11-11
Attending: EMERGENCY MEDICINE
Payer: COMMERCIAL

## 2023-11-11 VITALS
BODY MASS INDEX: 37.93 KG/M2 | SYSTOLIC BLOOD PRESSURE: 123 MMHG | TEMPERATURE: 99 F | HEART RATE: 89 BPM | RESPIRATION RATE: 18 BRPM | DIASTOLIC BLOOD PRESSURE: 81 MMHG | OXYGEN SATURATION: 99 % | HEIGHT: 66 IN | WEIGHT: 236 LBS

## 2023-11-11 DIAGNOSIS — N93.9 VAGINAL BLEEDING: ICD-10-CM

## 2023-11-11 DIAGNOSIS — O02.1 MISSED ABORTION WITH FETAL DEMISE BEFORE 20 COMPLETED WEEKS OF GESTATION: Primary | ICD-10-CM

## 2023-11-11 LAB
ABO + RH BLD: NORMAL
ALBUMIN SERPL BCP-MCNC: 4.6 G/DL (ref 3.5–5.2)
ALP SERPL-CCNC: 70 U/L (ref 55–135)
ALT SERPL W/O P-5'-P-CCNC: 11 U/L (ref 10–44)
ANION GAP SERPL CALC-SCNC: 7 MMOL/L (ref 8–16)
AST SERPL-CCNC: 15 U/L (ref 10–40)
BACTERIA #/AREA URNS HPF: ABNORMAL /HPF
BASOPHILS # BLD AUTO: 0.08 K/UL (ref 0–0.2)
BASOPHILS NFR BLD: 0.8 % (ref 0–1.9)
BILIRUB SERPL-MCNC: 0.5 MG/DL (ref 0.1–1)
BILIRUB UR QL STRIP: NEGATIVE
BLD GP AB SCN CELLS X3 SERPL QL: NORMAL
BUN SERPL-MCNC: 9 MG/DL (ref 6–20)
CALCIUM SERPL-MCNC: 9.8 MG/DL (ref 8.7–10.5)
CHLORIDE SERPL-SCNC: 107 MMOL/L (ref 95–110)
CLARITY UR: ABNORMAL
CO2 SERPL-SCNC: 23 MMOL/L (ref 23–29)
COLOR UR: YELLOW
CREAT SERPL-MCNC: 0.7 MG/DL (ref 0.5–1.4)
DIFFERENTIAL METHOD: ABNORMAL
EOSINOPHIL # BLD AUTO: 0.4 K/UL (ref 0–0.5)
EOSINOPHIL NFR BLD: 3.7 % (ref 0–8)
ERYTHROCYTE [DISTWIDTH] IN BLOOD BY AUTOMATED COUNT: 20 % (ref 11.5–14.5)
EST. GFR  (NO RACE VARIABLE): >60 ML/MIN/1.73 M^2
GLUCOSE SERPL-MCNC: 81 MG/DL (ref 70–110)
GLUCOSE UR QL STRIP: NEGATIVE
HCG INTACT+B SERPL-ACNC: 21.73 MIU/ML
HCT VFR BLD AUTO: 32.2 % (ref 37–48.5)
HGB BLD-MCNC: 8.5 G/DL (ref 12–16)
HGB UR QL STRIP: ABNORMAL
HYALINE CASTS #/AREA URNS LPF: 0 /LPF
IMM GRANULOCYTES # BLD AUTO: 0.03 K/UL (ref 0–0.04)
IMM GRANULOCYTES NFR BLD AUTO: 0.3 % (ref 0–0.5)
KETONES UR QL STRIP: ABNORMAL
LEUKOCYTE ESTERASE UR QL STRIP: ABNORMAL
LYMPHOCYTES # BLD AUTO: 2.3 K/UL (ref 1–4.8)
LYMPHOCYTES NFR BLD: 23.8 % (ref 18–48)
MCH RBC QN AUTO: 17.3 PG (ref 27–31)
MCHC RBC AUTO-ENTMCNC: 26.4 G/DL (ref 32–36)
MCV RBC AUTO: 65 FL (ref 82–98)
MICROSCOPIC COMMENT: ABNORMAL
MONOCYTES # BLD AUTO: 0.6 K/UL (ref 0.3–1)
MONOCYTES NFR BLD: 6.3 % (ref 4–15)
NEUTROPHILS # BLD AUTO: 6.4 K/UL (ref 1.8–7.7)
NEUTROPHILS NFR BLD: 65.1 % (ref 38–73)
NITRITE UR QL STRIP: NEGATIVE
NRBC BLD-RTO: 0 /100 WBC
PH UR STRIP: 6 [PH] (ref 5–8)
PLATELET # BLD AUTO: 492 K/UL (ref 150–450)
PMV BLD AUTO: 9.5 FL (ref 9.2–12.9)
POTASSIUM SERPL-SCNC: 3.9 MMOL/L (ref 3.5–5.1)
PROT SERPL-MCNC: 8 G/DL (ref 6–8.4)
PROT UR QL STRIP: ABNORMAL
RBC # BLD AUTO: 4.92 M/UL (ref 4–5.4)
RBC #/AREA URNS HPF: >100 /HPF (ref 0–4)
SODIUM SERPL-SCNC: 137 MMOL/L (ref 136–145)
SP GR UR STRIP: 1.03 (ref 1–1.03)
SPECIMEN OUTDATE: NORMAL
SQUAMOUS #/AREA URNS HPF: 15 /HPF
URN SPEC COLLECT METH UR: ABNORMAL
UROBILINOGEN UR STRIP-ACNC: NEGATIVE EU/DL
WBC # BLD AUTO: 9.77 K/UL (ref 3.9–12.7)
WBC #/AREA URNS HPF: 15 /HPF (ref 0–5)

## 2023-11-11 PROCEDURE — 81001 URINALYSIS AUTO W/SCOPE: CPT

## 2023-11-11 PROCEDURE — 84702 CHORIONIC GONADOTROPIN TEST: CPT

## 2023-11-11 PROCEDURE — 96361 HYDRATE IV INFUSION ADD-ON: CPT

## 2023-11-11 PROCEDURE — 85025 COMPLETE CBC W/AUTO DIFF WBC: CPT

## 2023-11-11 PROCEDURE — 99284 EMERGENCY DEPT VISIT MOD MDM: CPT | Mod: 25

## 2023-11-11 PROCEDURE — 96360 HYDRATION IV INFUSION INIT: CPT

## 2023-11-11 PROCEDURE — 25000003 PHARM REV CODE 250

## 2023-11-11 PROCEDURE — 86850 RBC ANTIBODY SCREEN: CPT

## 2023-11-11 PROCEDURE — 80053 COMPREHEN METABOLIC PANEL: CPT

## 2023-11-11 RX ORDER — SODIUM CHLORIDE 9 MG/ML
1000 INJECTION, SOLUTION INTRAVENOUS
Status: COMPLETED | OUTPATIENT
Start: 2023-11-11 | End: 2023-11-11

## 2023-11-11 RX ADMIN — SODIUM CHLORIDE 1000 ML: 0.9 INJECTION, SOLUTION INTRAVENOUS at 02:11

## 2023-11-11 NOTE — ED PROVIDER NOTES
Encounter Date: 2023       History     Chief Complaint   Patient presents with    PREGNANCY PROBLEM     JUST FOUND OUT SHE IS PREG ON THURS    Vaginal Bleeding     ONSET TODAY     23-year-old female  presents with vaginal bleeding that started this morning.  Patient noticed a large amount of bright red blood after urinating.  She had a positive pregnancy test last week and has seen her OBGYN.  However, has not had an ultrasound to confirm that the pregnancy is in the uterus.  She endorses lower abdominal cramping.  Denies back pain.  Denies nausea.  Denies vomiting.  Denies urinary symptoms.  Patient states her 1st pregnancy was a healthy pregnancy and she delivered full-term baby who is now 2 years old.  She did not have bleeding in 1st trimester with her 1st pregnancy.     The history is provided by the patient.     Review of patient's allergies indicates:  No Known Allergies  Past Medical History:   Diagnosis Date    Iron deficiency anemia 2018     Past Surgical History:   Procedure Laterality Date     SECTION N/A 2021    Procedure:  SECTION;  Surgeon: Rafael Rodriguez MD;  Location: [facility] L&D;  Service: OB/GYN;  Laterality: N/A;    LAPAROSCOPIC CHOLECYSTECTOMY N/A 2023    Procedure: CHOLECYSTECTOMY, LAPAROSCOPIC;  Surgeon: Trav Thomson MD;  Location: [facility];  Service: General;  Laterality: N/A;     Family History   Problem Relation Age of Onset    No Known Problems Mother     No Known Problems Father     Breast cancer Neg Hx     Ovarian cancer Neg Hx      Social History     Tobacco Use    Smoking status: Every Day     Types: Vaping with nicotine    Smokeless tobacco: Never   Substance Use Topics    Alcohol use: No    Drug use: No     Review of Systems   Constitutional:  Negative for fever.   HENT:  Negative for sore throat.    Respiratory:  Negative for shortness of breath.    Cardiovascular:  Negative for chest pain.   Gastrointestinal:  Positive for abdominal  pain. Negative for nausea.        Lower abdominal cramping   Genitourinary:  Positive for pelvic pain and vaginal bleeding. Negative for dysuria and vaginal discharge.   Musculoskeletal:  Negative for back pain.   Skin:  Negative for rash.   Neurological:  Negative for weakness.   Hematological:  Does not bruise/bleed easily.   All other systems reviewed and are negative.      Physical Exam     Initial Vitals [11/11/23 1219]   BP Pulse Resp Temp SpO2   138/86 103 18 98.2 °F (36.8 °C) 97 %      MAP       --         Physical Exam    Nursing note and vitals reviewed.  Constitutional: She appears well-developed and well-nourished. No distress.   HENT:   Head: Normocephalic.   Right Ear: External ear normal.   Left Ear: External ear normal.   Mouth/Throat: Oropharynx is clear and moist.   Eyes: Pupils are equal, round, and reactive to light. Right eye exhibits no discharge. Left eye exhibits no discharge.   Neck: Neck supple.   Normal range of motion.  Cardiovascular:  Normal rate, regular rhythm and normal heart sounds.           Pulmonary/Chest: Breath sounds normal.   Abdominal: Abdomen is soft. Bowel sounds are normal.   Musculoskeletal:      Cervical back: Normal range of motion and neck supple.     Neurological: She is alert and oriented to person, place, and time. No sensory deficit.   Skin: Skin is warm and dry. Capillary refill takes less than 2 seconds.   Psychiatric: She has a normal mood and affect. Her behavior is normal. Judgment and thought content normal.         ED Course   Procedures  Labs Reviewed   CBC W/ AUTO DIFFERENTIAL - Abnormal; Notable for the following components:       Result Value    Hemoglobin 8.5 (*)     Hematocrit 32.2 (*)     MCV 65 (*)     MCH 17.3 (*)     MCHC 26.4 (*)     RDW 20.0 (*)     Platelets 492 (*)     All other components within normal limits    Narrative:     Release to patient->Immediate   COMPREHENSIVE METABOLIC PANEL - Abnormal; Notable for the following components:     Anion Gap 7 (*)     All other components within normal limits    Narrative:     Release to patient->Immediate   URINALYSIS - Abnormal; Notable for the following components:    Appearance, UA Cloudy (*)     Protein, UA 2+ (*)     Ketones, UA 3+ (*)     Occult Blood UA 3+ (*)     Leukocytes, UA 1+ (*)     All other components within normal limits    Narrative:     Collection Type->Urine, Clean Catch   URINALYSIS MICROSCOPIC - Abnormal; Notable for the following components:    RBC, UA >100 (*)     WBC, UA 15 (*)     Bacteria Moderate (*)     All other components within normal limits    Narrative:     Collection Type->Urine, Clean Catch   HCG, QUANTITATIVE    Narrative:     Release to patient->Immediate   TYPE & SCREEN          Imaging Results              US OB <14 Wks TransAbd & TransVag, Single Gestation (XPD) (Final result)  Result time 11/11/23 14:15:45   Procedure changed from US OB <14 Wks, TransAbd, Single Gestation     Final result by Miguel Ruiz Jr., MD (11/11/23 14:15:45)                   Narrative:    Examination: Obstetrical sonogram transvaginal    CLINICAL HISTORY: Vaginal bleeding.    COMPARISON: None    TECHNIQUE: Real-time sonographic evaluation of the pelvic contents was completed utilizing both gray scale and color flow imaging with an appropriate transducer of adequate penetrability.    FINDINGS: Uterus maintains normal size at 8.1 x 4.5 cm with normal myometrial outline and without evidence of contour deforming masses. Endometrial echocomplex appears thickened measuring 2 mm with complex echoes noted within the lower endometrial canal as best determined on the transabdominal scan likely related to residual retained products of conception. Small cystic changes are established in the region of interest. The ovaries are not seen and obscured by the overlying gas.    IMPRESSION: Complex mixed echotexture in the lower uterine segment with numerous cystic changes likely attributed towards  retained products of conception. Correlate patient's clinical assessment and follow beta-hCG values..    Electronically signed by:  Miguel Ruiz MD  2023 02:15 PM CST Workstation: [id number]                                     Medications   0.9%  NaCl infusion (0 mLs Intravenous Stopped 23 3270)     Medical Decision Making  23-year-old well-appearing female  presents for vaginal bleeding in 1st trimester pregnancy.  A proximally 6 weeks pregnant.  Reports LMP 2023.  Patient reports bleeding started this morning and describes as large amount of bright red blood.  Denies clots.  Denies chest pain.  Denies shortness of breath.  Denies vaginal discharge.  Does endorse lower abdominal cramping.  Patient is very emotional upset.  DDx includes for her vaginal bleeding includes but is not limited to the following:  ectopic pregnancy, threatened miscarriage     Transvaginal US demonstrates retained products of conception.  No fetal pole.  No viable intrauterine pregnancy.  Negative for ectopic pregnancy.  Patient's blood type is O positive she does not require RhoGAM.  CBC is significant for microcytic anemia. CMP are unremarkable without electrolyte abnormalities.  HCG does not correlate viable pregnancy and has declined since value from 2 days ago.      Based on history, exam and diagnostic testing done on this visit, the most likely etiology of this patient's bleeding is missed .  Ultrasound and lab results discussed with patient and spouse.  Patient states she is having increased anxiety and desires to go home.  She declines having a pelvic exam at this time.  She states she will call her OBGYN 1st thing Monday morning for an appointment for follow-up.  Patient's pain is controlled, tolerating oral intake and appropriate for outpatient management with OBGYN and clinic follow up.  Hemodynamically stable for discharge.      Amount and/or Complexity of Data Reviewed  Labs: ordered.  Decision-making details documented in ED Course.  Radiology: ordered.     Details: OB ultrasound results reviewed   FINDINGS: Uterus maintains normal size at 8.1 x 4.5 cm with normal myometrial outline and without evidence of contour deforming masses. Endometrial echocomplex appears thickened measuring 2 mm with complex echoes noted within the lower endometrial canal as best determined on the transabdominal scan likely related to residual retained products of conception. Small cystic changes are established in the region of interest. The ovaries are not seen and obscured by the overlying gas.     IMPRESSION: Complex mixed echotexture in the lower uterine segment with numerous cystic changes likely attributed towards retained products of conception. Correlate patient's clinical assessment and follow beta-hCG values..       Risk  Prescription drug management.               ED Course as of 23 09   Sat 2023   1503 HCG Quant: 21.73 [HG]      ED Course User Index  [HG] Emeli Lucas NP                    Clinical Impression:   Final diagnoses:  [N93.9] Vaginal bleeding  [O02.1] Missed  with fetal demise before 20 completed weeks of gestation (Primary)        ED Disposition Condition    Discharge Stable          ED Prescriptions    None       Follow-up Information       Follow up With Specialties Details Why Contact Info Additional Information    Alok Angeles MD Family Medicine Go to  As needed, If symptoms worsen [address]  [address]  [facility] [address]  [phone]       [facility] - Emergency Dept Emergency Medicine Go to  As needed, If symptoms worsen [address]  [facility] [address]  [phone] 1st floor    Rafael Rodriguez MD Obstetrics, Obstetrics and Gynecology Schedule an appointment as soon as possible for a visit in 2 days  [address] [place] [id number]  [phone]                Emeli Lucas NP  [phone]

## 2023-11-12 LAB
C TRACH DNA SPEC QL NAA+PROBE: NOT DETECTED
N GONORRHOEA DNA SPEC QL NAA+PROBE: NOT DETECTED

## 2023-11-15 ENCOUNTER — OFFICE VISIT (OUTPATIENT)
Dept: OBSTETRICS AND GYNECOLOGY | Facility: CLINIC | Age: 23
End: 2023-11-15
Payer: COMMERCIAL

## 2023-11-15 ENCOUNTER — PATIENT MESSAGE (OUTPATIENT)
Dept: OBSTETRICS AND GYNECOLOGY | Facility: CLINIC | Age: 23
End: 2023-11-15
Payer: COMMERCIAL

## 2023-11-15 ENCOUNTER — LAB VISIT (OUTPATIENT)
Dept: LAB | Facility: HOSPITAL | Age: 23
End: 2023-11-15
Attending: SPECIALIST
Payer: COMMERCIAL

## 2023-11-15 VITALS
BODY MASS INDEX: 40.85 KG/M2 | SYSTOLIC BLOOD PRESSURE: 100 MMHG | WEIGHT: 253.06 LBS | DIASTOLIC BLOOD PRESSURE: 70 MMHG

## 2023-11-15 DIAGNOSIS — Z34.90 EARLY STAGE OF PREGNANCY: Primary | ICD-10-CM

## 2023-11-15 DIAGNOSIS — Z34.90 EARLY STAGE OF PREGNANCY: ICD-10-CM

## 2023-11-15 DIAGNOSIS — O03.9 COMPLETED INEVITABLE ABORTION: Primary | ICD-10-CM

## 2023-11-15 LAB
HCG INTACT+B SERPL-ACNC: 2.8 MIU/ML
PROGEST SERPL-MCNC: 0.5 NG/ML

## 2023-11-15 PROCEDURE — 1159F MED LIST DOCD IN RCRD: CPT | Mod: CPTII,S$GLB,, | Performed by: SPECIALIST

## 2023-11-15 PROCEDURE — 99213 PR OFFICE/OUTPT VISIT, EST, LEVL III, 20-29 MIN: ICD-10-PCS | Mod: S$GLB,,, | Performed by: SPECIALIST

## 2023-11-15 PROCEDURE — 3074F PR MOST RECENT SYSTOLIC BLOOD PRESSURE < 130 MM HG: ICD-10-PCS | Mod: CPTII,S$GLB,, | Performed by: SPECIALIST

## 2023-11-15 PROCEDURE — 3008F BODY MASS INDEX DOCD: CPT | Mod: CPTII,S$GLB,, | Performed by: SPECIALIST

## 2023-11-15 PROCEDURE — 3074F SYST BP LT 130 MM HG: CPT | Mod: CPTII,S$GLB,, | Performed by: SPECIALIST

## 2023-11-15 PROCEDURE — 1159F PR MEDICATION LIST DOCUMENTED IN MEDICAL RECORD: ICD-10-PCS | Mod: CPTII,S$GLB,, | Performed by: SPECIALIST

## 2023-11-15 PROCEDURE — 84702 CHORIONIC GONADOTROPIN TEST: CPT | Performed by: SPECIALIST

## 2023-11-15 PROCEDURE — 99999 PR PBB SHADOW E&M-EST. PATIENT-LVL III: CPT | Mod: PBBFAC,,, | Performed by: SPECIALIST

## 2023-11-15 PROCEDURE — 3078F DIAST BP <80 MM HG: CPT | Mod: CPTII,S$GLB,, | Performed by: SPECIALIST

## 2023-11-15 PROCEDURE — 99213 OFFICE O/P EST LOW 20 MIN: CPT | Mod: S$GLB,,, | Performed by: SPECIALIST

## 2023-11-15 PROCEDURE — 3078F PR MOST RECENT DIASTOLIC BLOOD PRESSURE < 80 MM HG: ICD-10-PCS | Mod: CPTII,S$GLB,, | Performed by: SPECIALIST

## 2023-11-15 PROCEDURE — 3008F PR BODY MASS INDEX (BMI) DOCUMENTED: ICD-10-PCS | Mod: CPTII,S$GLB,, | Performed by: SPECIALIST

## 2023-11-15 PROCEDURE — 36415 COLL VENOUS BLD VENIPUNCTURE: CPT | Mod: PN | Performed by: SPECIALIST

## 2023-11-15 PROCEDURE — 84144 ASSAY OF PROGESTERONE: CPT | Performed by: SPECIALIST

## 2023-11-15 PROCEDURE — 99999 PR PBB SHADOW E&M-EST. PATIENT-LVL III: ICD-10-PCS | Mod: PBBFAC,,, | Performed by: SPECIALIST

## 2023-11-17 NOTE — PROGRESS NOTES
22 yo WF presents for follow up SAB Pt presented to ER mosley new onset bleeding and cramping, which has abated. Pt denies f/c, n/v, intolerable pain  Past Medical History:   Diagnosis Date    Iron deficiency anemia 2018       Past Surgical History:   Procedure Laterality Date     SECTION N/A 2021    Procedure:  SECTION;  Surgeon: Rafael Rodriguez MD;  Location: [facility] L&D;  Service: OB/GYN;  Laterality: N/A;    LAPAROSCOPIC CHOLECYSTECTOMY N/A 2023    Procedure: CHOLECYSTECTOMY, LAPAROSCOPIC;  Surgeon: Trav Thomson MD;  Location: [facility] OR;  Service: General;  Laterality: N/A;       Family History   Problem Relation Age of Onset    No Known Problems Mother     No Known Problems Father     Breast cancer Neg Hx     Ovarian cancer Neg Hx        Social History     Socioeconomic History    Marital status: Single   Tobacco Use    Smoking status: Every Day     Types: Vaping with nicotine    Smokeless tobacco: Never   Substance and Sexual Activity    Alcohol use: No    Drug use: No    Sexual activity: Yes     Partners: Male     Birth control/protection: None     Social Determinants of Health     Stress: Stress Concern Present (2019)    [facility] [place] [facility] [employer] - Occupational Stress Questionnaire     Feeling of Stress : To some extent       Current Outpatient Medications   Medication Sig Dispense Refill    azelastine (ASTELIN) 137 mcg (0.1 %) nasal spray 1 spray (137 mcg total) by Nasal route 2 (two) times daily as needed for Rhinitis. (Patient not taking: Reported on 2023) 30 mL 0     No current facility-administered medications for this visit.     Facility-Administered Medications Ordered in Other Visits   Medication Dose Route Frequency Provider Last Rate Last Admin    electrolyte-S (ISOLYTE)   Intravenous Continuous Steven Cooper MD   Stopped at 23 1010    lactated ringers infusion   Intravenous Continuous Steven Cooper MD        LIDOcaine (PF) 10  mg/ml (1%) injection 10 mg  1 mL Intradermal Once Steven Cooper MD           Review of patient's allergies indicates:  No Known Allergies    Review of System:   General: no chills, fever, night sweats, weight gain or weight loss  Psychological: no depression or suicidal ideation  Breasts: no new or changing breast lumps, nipple discharge or masses.  Respiratory: no cough, shortness of breath, or wheezing  Cardiovascular: no chest pain or dyspnea on exertion  Gastrointestinal: no abdominal pain, change in bowel habits, or black or bloody stools  Genito-Urinary: no incontinence, urinary frequency/urgency or vulvar/vaginal symptoms, pelvic pain or abnormal vaginal bleeding.  Musculoskeletal: no gait disturbance or muscular weakness     BHCG declined to 21    I discussed completing SAB and rec repeat BHCG   Discussed bleeding precautions  Rec  avoiding conception x 3 cycles  Answered all questions    I spent a total of 30 minutes on the day of the visit. This includes face to face time and non-face to face time preparing to see the patient (eg, review of tests), obtaining and/or reviewing separately obtained history, documenting clinical information in the electronic or other health record, independently interpreting results and communicating results to the patient/family/caregiver, or care coordinator.

## 2023-11-20 LAB
FINAL PATHOLOGIC DIAGNOSIS: NORMAL
Lab: NORMAL

## 2024-01-24 ENCOUNTER — OFFICE VISIT (OUTPATIENT)
Dept: FAMILY MEDICINE | Facility: CLINIC | Age: 24
End: 2024-01-24
Payer: COMMERCIAL

## 2024-01-24 ENCOUNTER — PATIENT MESSAGE (OUTPATIENT)
Dept: FAMILY MEDICINE | Facility: CLINIC | Age: 24
End: 2024-01-24
Payer: COMMERCIAL

## 2024-01-24 DIAGNOSIS — H10.31 ACUTE BACTERIAL CONJUNCTIVITIS OF RIGHT EYE: Primary | ICD-10-CM

## 2024-01-24 PROCEDURE — 1160F RVW MEDS BY RX/DR IN RCRD: CPT | Mod: CPTII,95,,

## 2024-01-24 PROCEDURE — 99212 OFFICE O/P EST SF 10 MIN: CPT | Mod: 95,,,

## 2024-01-24 PROCEDURE — 1159F MED LIST DOCD IN RCRD: CPT | Mod: CPTII,95,,

## 2024-01-24 RX ORDER — POLYMYXIN B SULFATE AND TRIMETHOPRIM 1; 10000 MG/ML; [USP'U]/ML
1 SOLUTION OPHTHALMIC EVERY 4 HOURS
Qty: 10 ML | Refills: 0 | Status: SHIPPED | OUTPATIENT
Start: 2024-01-24 | End: 2024-03-05

## 2024-01-24 NOTE — PROGRESS NOTES
Subjective:       Patient ID: Ricardo Rivera is a 23 y.o. female.  The patient location is: [place], [place]  The chief complaint leading to consultation is: pink eye    Visit type: audiovisual    Face to Face time with patient: 5  10 minutes of total time spent on the encounter, which includes face to face time and non-face to face time preparing to see the patient (eg, review of tests), Obtaining and/or reviewing separately obtained history, Documenting clinical information in the electronic or other health record, Independently interpreting results (not separately reported) and communicating results to the patient/family/caregiver, or Care coordination (not separately reported).         Each patient to whom he or she provides medical services by telemedicine is:  (1) informed of the relationship between the physician and patient and the respective role of any other health care provider with respect to management of the patient; and (2) notified that he or she may decline to receive medical services by telemedicine and may withdraw from such care at any time.      Chief Complaint: No chief complaint on file.    Patient presents virtually to the clinic with complaint of possible pink eye.     States she was exposed to her nephew who had pink eye. She started with symptoms yesterday. Symptoms include right eye irritation, redness, and drainage. States eye was matted shut this am when she woke up.   Denies vision changes.     Has no other complaints or concerns today.     Patient educated on plan of care, verbalized understanding.       Eye Problem   The right eye is affected. This is a new problem. The current episode started yesterday. The patient is experiencing no pain. There is Known exposure to pink eye. Associated symptoms include an eye discharge, eye redness and itching. Pertinent negatives include no blurred vision, fever, nausea or vomiting. She has tried nothing for the symptoms.     Review of  Systems   Constitutional:  Negative for activity change, appetite change, chills, diaphoresis and fever.   HENT:  Negative for congestion, ear pain, postnasal drip, sinus pressure, sneezing and sore throat.    Eyes:  Positive for discharge, redness and itching. Negative for blurred vision, pain and visual disturbance.   Respiratory:  Negative for apnea, cough, chest tightness, shortness of breath and wheezing.    Cardiovascular:  Negative for chest pain and leg swelling.   Gastrointestinal:  Negative for abdominal distention, abdominal pain, constipation, diarrhea, nausea and vomiting.   Genitourinary:  Negative for difficulty urinating, dysuria, flank pain and frequency.   Skin:  Negative for color change, rash and wound.   Neurological:  Negative for dizziness.   All other systems reviewed and are negative.      Patient Active Problem List   Diagnosis    Iron deficiency anemia    Iron deficiency anemia due to chronic blood loss    Carpal tunnel syndrome, right    Anxiety and depression    Engorgement of breasts, postpartum condition or complication    Bilateral pulmonary embolism    Bilateral pulmonary embolism    Acute septic pulmonary embolism with acute cor pulmonale    Menorrhagia, premenopausal    Gallstones    Menorrhagia with irregular cycle       Objective:      Physical Exam  Constitutional:       General: She is not in acute distress.     Appearance: Normal appearance. She is not ill-appearing.   HENT:      Head: Normocephalic.   Eyes:      Conjunctiva/sclera:      Right eye: Right conjunctiva is injected.      Pupils: Pupils are equal, round, and reactive to light.      Comments: As seen on virtual visit   Pulmonary:      Effort: Pulmonary effort is normal. No respiratory distress.   Musculoskeletal:      Cervical back: Normal range of motion and neck supple.   Skin:     General: Skin is warm and dry.   Neurological:      General: No focal deficit present.      Mental Status: She is alert and oriented  to person, place, and time.   Psychiatric:         Mood and Affect: Mood normal.         Behavior: Behavior normal.         Lab Results   Component Value Date    WBC 9.77 11/11/2023    HGB 8.5 (L) 11/11/2023    HCT 32.2 (L) 11/11/2023     (H) 11/11/2023    CHOL 170 11/02/2021    TRIG 107 11/02/2021    HDL 41 (L) 11/02/2021    ALT 11 11/11/2023    AST 15 11/11/2023     11/11/2023    K 3.9 11/11/2023     11/11/2023    CREATININE 0.7 11/11/2023    BUN 9 11/11/2023    CO2 23 11/11/2023    TSH 2.625 01/23/2023    INR 1.0 10/26/2021    GLUF 89 05/25/2021     The ASCVD Risk score (Hebert DK, et al., 2019) failed to calculate for the following reasons:    The 2019 ASCVD risk score is only valid for ages 40 to 79    Assessment:       1. Acute bacterial conjunctivitis of right eye        Plan:       1. Acute bacterial conjunctivitis of right eye  -     polymyxin B sulf-trimethoprim (POLYTRIM) 10,000 unit- 1 mg/mL Drop; Place 1 drop into the right eye every 4 (four) hours.  Dispense: 10 mL; Refill: 0       Follow up if symptoms worsen or fail to improve.

## 2024-01-24 NOTE — PATIENT INSTRUCTIONS

## 2024-03-05 ENCOUNTER — OFFICE VISIT (OUTPATIENT)
Dept: FAMILY MEDICINE | Facility: CLINIC | Age: 24
End: 2024-03-05
Payer: COMMERCIAL

## 2024-03-05 ENCOUNTER — LAB VISIT (OUTPATIENT)
Dept: LAB | Facility: HOSPITAL | Age: 24
End: 2024-03-05
Attending: FAMILY MEDICINE
Payer: COMMERCIAL

## 2024-03-05 VITALS
BODY MASS INDEX: 38.09 KG/M2 | DIASTOLIC BLOOD PRESSURE: 62 MMHG | WEIGHT: 237 LBS | HEIGHT: 66 IN | SYSTOLIC BLOOD PRESSURE: 128 MMHG | HEART RATE: 96 BPM | TEMPERATURE: 99 F | OXYGEN SATURATION: 99 % | RESPIRATION RATE: 18 BRPM

## 2024-03-05 DIAGNOSIS — F32.A ANXIETY AND DEPRESSION: ICD-10-CM

## 2024-03-05 DIAGNOSIS — D50.0 IRON DEFICIENCY ANEMIA DUE TO CHRONIC BLOOD LOSS: Primary | ICD-10-CM

## 2024-03-05 DIAGNOSIS — D50.0 IRON DEFICIENCY ANEMIA DUE TO CHRONIC BLOOD LOSS: ICD-10-CM

## 2024-03-05 DIAGNOSIS — F41.9 ANXIETY AND DEPRESSION: ICD-10-CM

## 2024-03-05 LAB
ALBUMIN SERPL BCP-MCNC: 4.1 G/DL (ref 3.5–5.2)
ALP SERPL-CCNC: 61 U/L (ref 55–135)
ALT SERPL W/O P-5'-P-CCNC: 11 U/L (ref 10–44)
ANION GAP SERPL CALC-SCNC: 11 MMOL/L (ref 8–16)
AST SERPL-CCNC: 15 U/L (ref 10–40)
BASOPHILS # BLD AUTO: 0.07 K/UL (ref 0–0.2)
BASOPHILS NFR BLD: 0.9 % (ref 0–1.9)
BILIRUB SERPL-MCNC: 0.9 MG/DL (ref 0.1–1)
BUN SERPL-MCNC: 8 MG/DL (ref 6–20)
CALCIUM SERPL-MCNC: 9.4 MG/DL (ref 8.7–10.5)
CHLORIDE SERPL-SCNC: 110 MMOL/L (ref 95–110)
CHOLEST SERPL-MCNC: 143 MG/DL (ref 120–199)
CHOLEST/HDLC SERPL: 3.3 {RATIO} (ref 2–5)
CO2 SERPL-SCNC: 18 MMOL/L (ref 23–29)
CREAT SERPL-MCNC: 0.8 MG/DL (ref 0.5–1.4)
DIFFERENTIAL METHOD BLD: ABNORMAL
EOSINOPHIL # BLD AUTO: 0.1 K/UL (ref 0–0.5)
EOSINOPHIL NFR BLD: 1.1 % (ref 0–8)
ERYTHROCYTE [DISTWIDTH] IN BLOOD BY AUTOMATED COUNT: 20.4 % (ref 11.5–14.5)
EST. GFR  (NO RACE VARIABLE): >60 ML/MIN/1.73 M^2
GLUCOSE SERPL-MCNC: 86 MG/DL (ref 70–110)
HCT VFR BLD AUTO: 30.2 % (ref 37–48.5)
HDLC SERPL-MCNC: 43 MG/DL (ref 40–75)
HDLC SERPL: 30.1 % (ref 20–50)
HGB BLD-MCNC: 7.4 G/DL (ref 12–16)
IMM GRANULOCYTES # BLD AUTO: 0.02 K/UL (ref 0–0.04)
IMM GRANULOCYTES NFR BLD AUTO: 0.2 % (ref 0–0.5)
IRON SERPL-MCNC: 15 UG/DL (ref 30–160)
LDLC SERPL CALC-MCNC: 87.8 MG/DL (ref 63–159)
LYMPHOCYTES # BLD AUTO: 1.9 K/UL (ref 1–4.8)
LYMPHOCYTES NFR BLD: 23.1 % (ref 18–48)
MCH RBC QN AUTO: 15.6 PG (ref 27–31)
MCHC RBC AUTO-ENTMCNC: 24.5 G/DL (ref 32–36)
MCV RBC AUTO: 64 FL (ref 82–98)
MONOCYTES # BLD AUTO: 0.6 K/UL (ref 0.3–1)
MONOCYTES NFR BLD: 7.3 % (ref 4–15)
NEUTROPHILS # BLD AUTO: 5.5 K/UL (ref 1.8–7.7)
NEUTROPHILS NFR BLD: 67.4 % (ref 38–73)
NONHDLC SERPL-MCNC: 100 MG/DL
NRBC BLD-RTO: 0 /100 WBC
PLATELET # BLD AUTO: 416 K/UL (ref 150–450)
PMV BLD AUTO: 10.4 FL (ref 9.2–12.9)
POTASSIUM SERPL-SCNC: 4.2 MMOL/L (ref 3.5–5.1)
PROT SERPL-MCNC: 7.5 G/DL (ref 6–8.4)
RBC # BLD AUTO: 4.75 M/UL (ref 4–5.4)
SATURATED IRON: 3 % (ref 20–50)
SODIUM SERPL-SCNC: 139 MMOL/L (ref 136–145)
TOTAL IRON BINDING CAPACITY: 515 UG/DL (ref 250–450)
TRANSFERRIN SERPL-MCNC: 348 MG/DL (ref 200–375)
TRIGL SERPL-MCNC: 61 MG/DL (ref 30–150)
WBC # BLD AUTO: 8.09 K/UL (ref 3.9–12.7)

## 2024-03-05 PROCEDURE — 80061 LIPID PANEL: CPT | Performed by: FAMILY MEDICINE

## 2024-03-05 PROCEDURE — 80053 COMPREHEN METABOLIC PANEL: CPT | Performed by: FAMILY MEDICINE

## 2024-03-05 PROCEDURE — 85025 COMPLETE CBC W/AUTO DIFF WBC: CPT | Performed by: FAMILY MEDICINE

## 2024-03-05 PROCEDURE — 99999 PR PBB SHADOW E&M-EST. PATIENT-LVL IV: CPT | Mod: PBBFAC,,, | Performed by: FAMILY MEDICINE

## 2024-03-05 PROCEDURE — 83540 ASSAY OF IRON: CPT | Performed by: FAMILY MEDICINE

## 2024-03-05 PROCEDURE — 99213 OFFICE O/P EST LOW 20 MIN: CPT | Mod: S$GLB,,, | Performed by: FAMILY MEDICINE

## 2024-03-05 PROCEDURE — 3008F BODY MASS INDEX DOCD: CPT | Mod: CPTII,S$GLB,, | Performed by: FAMILY MEDICINE

## 2024-03-05 PROCEDURE — 3074F SYST BP LT 130 MM HG: CPT | Mod: CPTII,S$GLB,, | Performed by: FAMILY MEDICINE

## 2024-03-05 PROCEDURE — 1159F MED LIST DOCD IN RCRD: CPT | Mod: CPTII,S$GLB,, | Performed by: FAMILY MEDICINE

## 2024-03-05 PROCEDURE — 3078F DIAST BP <80 MM HG: CPT | Mod: CPTII,S$GLB,, | Performed by: FAMILY MEDICINE

## 2024-03-05 PROCEDURE — 36415 COLL VENOUS BLD VENIPUNCTURE: CPT | Performed by: FAMILY MEDICINE

## 2024-03-05 RX ORDER — ESCITALOPRAM OXALATE 10 MG/1
10 TABLET ORAL DAILY
Qty: 30 TABLET | Refills: 11 | Status: SHIPPED | OUTPATIENT
Start: 2024-03-05 | End: 2024-05-21

## 2024-03-05 NOTE — PROGRESS NOTES
Subjective:       Patient ID: Ricardo Rivera is a 23 y.o. female.    Chief Complaint: Anxiety, Depression, and Stress      Anxiety and depression started several months ago.  Patient has spontaneous or near spontaneous crying very labile mood.  Denies suicidal thoughts or ideation.  Patient Active Problem List:     Iron deficiency anemia     Iron deficiency anemia due to chronic blood loss     Carpal tunnel syndrome, right     Anxiety and depression-has taken Zoloft in the past, better with Lexapro     Engorgement of breasts, postpartum condition or complication     Bilateral pulmonary embolism     Bilateral pulmonary embolism     Acute septic pulmonary embolism with acute cor pulmonale     Menorrhagia, premenopausal     Gallstones     Menorrhagia with irregular cycle  Lab Results       Component                Value               Date                       WBC                      9.77                11/11/2023                 HGB                      8.5 (L)             11/11/2023                 HCT                      32.2 (L)            11/11/2023                 PLT                      492 (H)             11/11/2023                 CHOL                     170                 11/02/2021                 TRIG                     107                 11/02/2021                 HDL                      41 (L)              11/02/2021                 ALT                      11                  11/11/2023                 AST                      15                  11/11/2023                 NA                       137                 11/11/2023                 K                        3.9                 11/11/2023                 CL                       107                 11/11/2023                 CREATININE               0.7                 11/11/2023                 BUN                      9                   11/11/2023                 CO2                      23                  11/11/2023                  TSH                      2.625               01/23/2023                 INR                      1.0                 10/26/2021                 GLUF                     89                  05/25/2021                  Anxiety  Presents for initial visit. The problem has been gradually worsening. Symptoms include chest pain, feeling of choking and nervous/anxious behavior. Patient reports no compulsions, confusion, decreased concentration, depressed mood, dizziness, dry mouth, excessive worry, hyperventilation, impotence, insomnia, irritability, malaise, muscle tension, nausea, obsessions, palpitations, panic, restlessness, shortness of breath or suicidal ideas. Primary symptoms comment: Panic attacks several times a week.     Her past medical history is significant for anxiety/panic attacks.   Depression  Visit Type: initial  Patient presents with the following symptoms: nervousness/anxiety.  Patient is not experiencing: anhedonia, chest pain, choking sensation, compulsions, confusion, decreased concentration, depressed mood, dizziness, dry mouth, excessive worry, fatigue, feelings of hopelessness, feelings of worthlessness, hypersomnia, hyperventilation, impotence, insomnia, irritability, malaise, muscle tension, nausea, obsessions, palpitations, panic, restlessness, shortness of breath, suicidal ideas, suicidal planning, thoughts of death, weight gain and weight loss.    Patient has a history of: anxiety/panic attacks        Allergies and Medications:   Review of patient's allergies indicates:  No Known Allergies  Current Outpatient Medications   Medication Sig Dispense Refill    azelastine (ASTELIN) 137 mcg (0.1 %) nasal spray 1 spray (137 mcg total) by Nasal route 2 (two) times daily as needed for Rhinitis. (Patient not taking: Reported on 11/9/2023) 30 mL 0    EScitalopram oxalate (LEXAPRO) 10 MG tablet Take 1 tablet (10 mg total) by mouth once daily. 30 tablet 11    polymyxin B sulf-trimethoprim  (POLYTRIM) 10,000 unit- 1 mg/mL Drop Place 1 drop into the right eye every 4 (four) hours. (Patient not taking: Reported on 3/5/2024) 10 mL 0     No current facility-administered medications for this visit.     Facility-Administered Medications Ordered in Other Visits   Medication Dose Route Frequency Provider Last Rate Last Admin    electrolyte-S (ISOLYTE)   Intravenous Continuous Steven Cooper MD   Stopped at 02/02/23 1010    lactated ringers infusion   Intravenous Continuous Steven Cooper MD        LIDOcaine (PF) 10 mg/ml (1%) injection 10 mg  1 mL Intradermal Once Steven Cooper MD           Family History:   Family History   Problem Relation Age of Onset    No Known Problems Mother     No Known Problems Father     Breast cancer Neg Hx     Ovarian cancer Neg Hx        Social History:   Social History     Socioeconomic History    Marital status: Single   Tobacco Use    Smoking status: Every Day     Types: Vaping with nicotine    Smokeless tobacco: Never   Substance and Sexual Activity    Alcohol use: No    Drug use: No    Sexual activity: Yes     Partners: Male     Birth control/protection: None     Social Determinants of Health     Financial Resource Strain: High Risk (1/18/2024)    Overall Financial Resource Strain (CARDIA)     Difficulty of Paying Living Expenses: Hard   Food Insecurity: Food Insecurity Present (1/18/2024)    Hunger Vital Sign     Worried About Running Out of Food in the Last Year: Sometimes true     Ran Out of Food in the Last Year: Sometimes true   Transportation Needs: No Transportation Needs (1/18/2024)    PRAPARE - Transportation     Lack of Transportation (Medical): No     Lack of Transportation (Non-Medical): No   Physical Activity: Sufficiently Active (1/18/2024)    Exercise Vital Sign     Days of Exercise per Week: 4 days     Minutes of Exercise per Session: 60 min   Stress: Stress Concern Present (1/18/2024)    [employer] - Occupational  Stress Questionnaire     Feeling of Stress : Very much   Social Connections: Unknown (1/18/2024)    Social Connection and Isolation Panel [NHANES]     Frequency of Communication with Friends and Family: More than three times a week     Frequency of Social Gatherings with Friends and Family: Once a week     Active Member of Clubs or Organizations: No     Attends Club or Organization Meetings: Never     Marital Status: Living with partner   Housing Stability: High Risk (1/18/2024)    Housing Stability Vital Sign     Unable to Pay for Housing in the Last Year: Yes     Number of Places Lived in the Last Year: 2     Unstable Housing in the Last Year: No       Review of Systems   Constitutional:  Negative for irritability, weight gain and weight loss.   Respiratory:  Negative for choking and shortness of breath.    Cardiovascular:  Positive for chest pain. Negative for palpitations.   Gastrointestinal:  Negative for nausea.   Genitourinary:  Negative for impotence.   Neurological:  Negative for dizziness.   Psychiatric/Behavioral:  Positive for depression. Negative for confusion, decreased concentration and suicidal ideas. The patient is nervous/anxious. The patient does not have insomnia.        Objective:     Vitals:    03/05/24 1027   BP: 128/62   Pulse: 96   Resp: 18   Temp: 98.6 °F (37 °C)        Physical Exam    Assessment:       1. Iron deficiency anemia due to chronic blood loss    2. Anxiety and depression        Plan:       Ricardo was seen today for anxiety, depression and stress.    Diagnoses and all orders for this visit:    Iron deficiency anemia due to chronic blood loss  -     CBC Auto Differential; Future  -     Iron and TIBC; Future    Anxiety and depression  -     EScitalopram oxalate (LEXAPRO) 10 MG tablet; Take 1 tablet (10 mg total) by mouth once daily.  -     Ambulatory referral/consult to Psychology; Future  -     CBC Auto Differential; Future  -     Comprehensive Metabolic Panel; Future  -      Lipid Panel; Future         Follow up in about 1 month (around 4/5/2024) for follow up depression anxiety.

## 2024-03-06 NOTE — PROGRESS NOTES
The blood count illustrates persistent anemia with low iron count recommend multivitamin with iron and return to clinic.

## 2024-03-07 ENCOUNTER — TELEPHONE (OUTPATIENT)
Dept: FAMILY MEDICINE | Facility: CLINIC | Age: 24
End: 2024-03-07
Payer: COMMERCIAL

## 2024-03-07 NOTE — TELEPHONE ENCOUNTER
----- Message from Alok Angeles MD sent at 3/6/2024  8:14 AM CST -----  The blood count illustrates persistent anemia with low iron count recommend multivitamin with iron and return to clinic.

## 2024-04-18 ENCOUNTER — OFFICE VISIT (OUTPATIENT)
Dept: FAMILY MEDICINE | Facility: CLINIC | Age: 24
End: 2024-04-18
Payer: COMMERCIAL

## 2024-04-18 VITALS
BODY MASS INDEX: 37.45 KG/M2 | WEIGHT: 233 LBS | DIASTOLIC BLOOD PRESSURE: 62 MMHG | RESPIRATION RATE: 18 BRPM | HEIGHT: 66 IN | HEART RATE: 74 BPM | SYSTOLIC BLOOD PRESSURE: 108 MMHG | OXYGEN SATURATION: 98 % | TEMPERATURE: 98 F

## 2024-04-18 DIAGNOSIS — D50.8 OTHER IRON DEFICIENCY ANEMIA: ICD-10-CM

## 2024-04-18 DIAGNOSIS — F41.9 ANXIETY AND DEPRESSION: Primary | ICD-10-CM

## 2024-04-18 DIAGNOSIS — D50.0 IRON DEFICIENCY ANEMIA DUE TO CHRONIC BLOOD LOSS: ICD-10-CM

## 2024-04-18 DIAGNOSIS — F32.A ANXIETY AND DEPRESSION: Primary | ICD-10-CM

## 2024-04-18 DIAGNOSIS — N92.4 MENORRHAGIA, PREMENOPAUSAL: ICD-10-CM

## 2024-04-18 PROCEDURE — 3008F BODY MASS INDEX DOCD: CPT | Mod: CPTII,S$GLB,, | Performed by: FAMILY MEDICINE

## 2024-04-18 PROCEDURE — 1159F MED LIST DOCD IN RCRD: CPT | Mod: CPTII,S$GLB,, | Performed by: FAMILY MEDICINE

## 2024-04-18 PROCEDURE — 99213 OFFICE O/P EST LOW 20 MIN: CPT | Mod: S$GLB,,, | Performed by: FAMILY MEDICINE

## 2024-04-18 PROCEDURE — 99999 PR PBB SHADOW E&M-EST. PATIENT-LVL IV: CPT | Mod: PBBFAC,,, | Performed by: FAMILY MEDICINE

## 2024-04-18 PROCEDURE — 3078F DIAST BP <80 MM HG: CPT | Mod: CPTII,S$GLB,, | Performed by: FAMILY MEDICINE

## 2024-04-18 PROCEDURE — 3074F SYST BP LT 130 MM HG: CPT | Mod: CPTII,S$GLB,, | Performed by: FAMILY MEDICINE

## 2024-04-18 RX ORDER — CARIPRAZINE 1.5 MG/1
CAPSULE, GELATIN COATED ORAL
Qty: 540 CAPSULE | Refills: 0 | Status: SHIPPED | OUTPATIENT
Start: 2024-04-18 | End: 2024-05-21

## 2024-04-18 NOTE — PROGRESS NOTES
Subjective:       Patient ID: Ricardo Rivera is a 23 y.o. female.    Chief Complaint: Anxiety and Depression      Patient is here for follow-up on anxiety and depression was started on Lexapro 10 mg x1 month says it is not really helping. Poor motivation. Previously took Zoloft but it made a very flat.  Still having trouble sleeping at night.    Anxiety  Presents for follow-up (She reports a decrease in her anxiety and panic symptoms.) visit. Patient reports no chest pain, compulsions, confusion, decreased concentration, feeling of choking, irritability, malaise, muscle tension, nausea or nervous/anxious behavior.       Depression  Visit Type: follow-up  Patient presents with the following symptoms: anhedonia and feelings of worthlessness.  Patient is not experiencing: compulsions, confusion, decreased concentration, hypersomnia, irritability, malaise, muscle tension and nervousness/anxiety.          Allergies and Medications:   Review of patient's allergies indicates:  No Known Allergies  Current Outpatient Medications   Medication Sig Dispense Refill    EScitalopram oxalate (LEXAPRO) 10 MG tablet Take 1 tablet (10 mg total) by mouth once daily. 30 tablet 11    pramoxine-hydrocortisone (PROCTOCREAM-HC) 1-1 % rectal cream Place rectally 3 (three) times daily. 30 g 0    cariprazine (VRAYLAR) 1.5 mg Cap Take 1 capsule (1.5 mg total) by mouth Daily for 180 days, THEN 2 capsules (3 mg total) Daily. 540 capsule 0     No current facility-administered medications for this visit.       Family History:   Family History   Problem Relation Name Age of Onset    No Known Problems Mother      No Known Problems Father      Breast cancer Neg Hx      Ovarian cancer Neg Hx         Social History:   Social History     Socioeconomic History    Marital status: Single   Tobacco Use    Smoking status: Every Day     Types: Vaping with nicotine    Smokeless tobacco: Never   Substance and Sexual Activity    Alcohol use: No     Drug use: No    Sexual activity: Yes     Partners: Male     Birth control/protection: None     Social Determinants of Health     Financial Resource Strain: High Risk (1/18/2024)    Overall Financial Resource Strain (CARDIA)     Difficulty of Paying Living Expenses: Hard   Food Insecurity: Food Insecurity Present (1/18/2024)    Hunger Vital Sign     Worried About Running Out of Food in the Last Year: Sometimes true     Ran Out of Food in the Last Year: Sometimes true   Transportation Needs: No Transportation Needs (1/18/2024)    PRAPARE - Transportation     Lack of Transportation (Medical): No     Lack of Transportation (Non-Medical): No   Physical Activity: Sufficiently Active (1/18/2024)    Exercise Vital Sign     Days of Exercise per Week: 4 days     Minutes of Exercise per Session: 60 min   Stress: Stress Concern Present (1/18/2024)    [employer] - Occupational Stress Questionnaire     Feeling of Stress : Very much   Social Connections: Unknown (1/18/2024)    Social Connection and Isolation Panel [NHANES]     Frequency of Communication with Friends and Family: More than three times a week     Frequency of Social Gatherings with Friends and Family: Once a week     Active Member of Clubs or Organizations: No     Attends Club or Organization Meetings: Never     Marital Status: Living with partner   Housing Stability: High Risk (1/18/2024)    Housing Stability Vital Sign     Unable to Pay for Housing in the Last Year: Yes     Number of Places Lived in the Last Year: 2     Unstable Housing in the Last Year: No       Review of Systems   Constitutional:  Negative for irritability.   Cardiovascular:  Negative for chest pain.   Gastrointestinal:  Negative for nausea.   Psychiatric/Behavioral:  Positive for depression. Negative for confusion and decreased concentration. The patient is not nervous/anxious.        Objective:     Vitals:    04/18/24 1554   BP: 108/62   Pulse: 74   Resp: 18    Temp: 97.8 °F (36.6 °C)        Physical Exam  Vitals and nursing note reviewed.   Constitutional:       General: She is not in acute distress.     Appearance: She is well-developed. She is not diaphoretic.   HENT:      Head: Normocephalic and atraumatic.   Eyes:      Conjunctiva/sclera: Conjunctivae normal.      Pupils: Pupils are equal, round, and reactive to light.   Pulmonary:      Effort: Pulmonary effort is normal.   Musculoskeletal:      Cervical back: Normal range of motion.   Skin:     General: Skin is dry.      Coloration: Skin is not pale.      Findings: No erythema.   Psychiatric:         Behavior: Behavior normal.         Thought Content: Thought content normal.         Judgment: Judgment normal.         Assessment:       1. Anxiety and depression    2. Iron deficiency anemia due to chronic blood loss    3. Menorrhagia, premenopausal    4. Other iron deficiency anemia        Plan:       Ricardo was seen today for anxiety and depression.    Diagnoses and all orders for this visit:    Anxiety and depression  -     Ambulatory referral/consult to Psychology; Future  -     cariprazine (VRAYLAR) 1.5 mg Cap; Take 1 capsule (1.5 mg total) by mouth Daily for 180 days, THEN 2 capsules (3 mg total) Daily.    Iron deficiency anemia due to chronic blood loss patient has seen a gyn was recommended to start birth control but had a risk of blood clots because of previous blood clots.  -     CBC W/ AUTO DIFFERENTIAL; Future    Menorrhagia, premenopausal  -     CBC W/ AUTO DIFFERENTIAL; Future    Other iron deficiency anemia         Follow up in about 1 month (around 5/18/2024) for video visit.

## 2024-05-21 ENCOUNTER — OFFICE VISIT (OUTPATIENT)
Dept: FAMILY MEDICINE | Facility: CLINIC | Age: 24
End: 2024-05-21
Payer: COMMERCIAL

## 2024-05-21 DIAGNOSIS — F32.A ANXIETY AND DEPRESSION: Primary | ICD-10-CM

## 2024-05-21 DIAGNOSIS — F41.9 ANXIETY AND DEPRESSION: Primary | ICD-10-CM

## 2024-05-21 PROCEDURE — 99213 OFFICE O/P EST LOW 20 MIN: CPT | Mod: 95,,, | Performed by: FAMILY MEDICINE

## 2024-05-21 RX ORDER — ESCITALOPRAM OXALATE 5 MG/1
5 TABLET ORAL DAILY
Qty: 30 TABLET | Refills: 11 | Status: SHIPPED | OUTPATIENT
Start: 2024-05-21 | End: 2025-05-21

## 2024-05-21 NOTE — PROGRESS NOTES
Subjective:      Patient is here as a follow-up on anxiety and depression.  She is continuing on Lexapro because the Vraylar was going to cost over 3000 dollars.  The chief complaint leading to consultation is:  Anxiety and depression anxiety has improved but she still feels flat from as a side effect of the medicine.  The patient location is:  Home  Visit type: Virtual visit with synchronous audio/video or audio only  This was a video visit in lieu of in-person visit due to the coronavirus emergency. Patient acknowledged and consented to the video visit encounter.     HPI    Past Surgical History:   Procedure Laterality Date     SECTION N/A 2021    Procedure:  SECTION;  Surgeon: Rafael Rodriguez MD;  Location: [facility] L&D;  Service: OB/GYN;  Laterality: N/A;    LAPAROSCOPIC CHOLECYSTECTOMY N/A 2023    Procedure: CHOLECYSTECTOMY, LAPAROSCOPIC;  Surgeon: Trav Thomson MD;  Location: [facility] OR;  Service: General;  Laterality: N/A;     Past Medical History:   Diagnosis Date    Iron deficiency anemia 2018     Family History   Problem Relation Name Age of Onset    No Known Problems Mother      No Known Problems Father      Breast cancer Neg Hx      Ovarian cancer Neg Hx          Social History:   Marital Status: Single  Alcohol History:  reports no history of alcohol use.  Tobacco History:  reports that she has been smoking vaping with nicotine. She has never used smokeless tobacco.  Drug History:  reports no history of drug use.    Review of patient's allergies indicates:  No Known Allergies    Current Outpatient Medications   Medication Sig Dispense Refill    cariprazine (VRAYLAR) 1.5 mg Cap Take 1 capsule (1.5 mg total) by mouth Daily for 180 days, THEN 2 capsules (3 mg total) Daily. 540 capsule 0    EScitalopram oxalate (LEXAPRO) 5 MG Tab Take 1 tablet (5 mg total) by mouth once daily. 30 tablet 11    pramoxine-hydrocortisone (PROCTOCREAM-HC) 1-1 % rectal cream Place rectally 3  (three) times daily. 30 g 0     No current facility-administered medications for this visit.       Review of Systems   Constitutional:  Negative for activity change and unexpected weight change.   HENT:  Negative for hearing loss, rhinorrhea and trouble swallowing.    Eyes:  Negative for discharge and visual disturbance.   Respiratory:  Negative for chest tightness and wheezing.    Cardiovascular:  Negative for chest pain and palpitations.   Gastrointestinal:  Positive for constipation. Negative for blood in stool, diarrhea and vomiting.   Endocrine: Negative for polydipsia and polyuria.   Genitourinary:  Negative for difficulty urinating, dysuria, hematuria and menstrual problem.   Musculoskeletal:  Negative for arthralgias, joint swelling and neck pain.   Neurological:  Negative for weakness and headaches.   Psychiatric/Behavioral:  Negative for confusion and dysphoric mood.          Objective:        Physical Exam:   Physical Exam  Vitals and nursing note reviewed.   Constitutional:       General: She is not in acute distress.     Appearance: She is well-developed. She is not diaphoretic.   HENT:      Head: Normocephalic and atraumatic.   Eyes:      Conjunctiva/sclera: Conjunctivae normal.      Pupils: Pupils are equal, round, and reactive to light.   Pulmonary:      Effort: Pulmonary effort is normal.   Musculoskeletal:      Cervical back: Normal range of motion.   Skin:     General: Skin is dry.      Coloration: Skin is not pale.      Findings: No erythema.   Psychiatric:         Behavior: Behavior normal.         Thought Content: Thought content normal.         Judgment: Judgment normal.     Denies suicidal thoughts or ideation.         Assessment:       1. Anxiety and depression      Plan:   Anxiety and depression  -     EScitalopram oxalate (LEXAPRO) 5 MG Tab; Take 1 tablet (5 mg total) by mouth once daily.  Dispense: 30 tablet; Refill: 11    Patient is amenable to decreasing Lexapro to 5 mg per day will do  this and see her back in 3 months.  Follow up in about 3 months (around 8/21/2024) for follow up anxiety and depression..    Total time spent with patient: 15'    Each patient to whom he or she provides medical services by telemedicine is:  (1) informed of the relationship between the physician and patient and the respective role of any other health care provider with respect to management of the patient; and (2) notified that he or she may decline to receive medical services by telemedicine and may withdraw from such care at any time.

## 2024-06-05 NOTE — PROGRESS NOTES
06/05/24 1352   Discharge Planning   Home or Post Acute Services Post acute facilities (Rehab/SNF/etc)   Type of Post Acute Facility Services Long term care   Patient expects to be discharged to: return [facility]   Does the patient need discharge transport arranged? Yes   RoundTrip coordination needed? Yes   Patient Choice   Patient / Family choosing to utilize agency / facility established prior to hospitalization Yes     Met with pt on interdisciplinary rounds this am. Pt admitted from [facility]. Confirmed with pt plan for return on discharge. Currently on IV Abx. Nephrology & wound care nurse on consult. Care transitions team to cont to follow for dc needs & case progression.    19 yo WF presents for continued contraceptive management. Pt discosed she is inadvertadly taking the pills continuously and having ocassional BTB. I discussed this is likely the etiology of the BTB however taking the active pill continuouslyis acceptable practice.  Past Medical History:   Diagnosis Date    Iron deficiency anemia 2/27/2018       History reviewed. No pertinent surgical history.    Family History   Problem Relation Age of Onset    Breast cancer Neg Hx     Ovarian cancer Neg Hx        Social History     Socioeconomic History    Marital status: Single     Spouse name: None    Number of children: None    Years of education: None    Highest education level: None   Social Needs    Financial resource strain: None    Food insecurity - worry: None    Food insecurity - inability: None    Transportation needs - medical: None    Transportation needs - non-medical: None   Occupational History    None   Tobacco Use    Smoking status: Never Smoker    Smokeless tobacco: Never Used   Substance and Sexual Activity    Alcohol use: No    Drug use: No    Sexual activity: Yes     Birth control/protection: OCP   Other Topics Concern    None   Social History Narrative    None       Current Outpatient Medications   Medication Sig Dispense Refill    fluoxetine 10 MG Tab Take 10 mg by mouth once daily.      JUNEL 1/20, 21, 1-20 mg-mcg per tablet TAKE 1 TABLET BY MOUTH ONCE DAILY. 21 tablet 11     No current facility-administered medications for this visit.        Review of patient's allergies indicates:  No Known Allergies    Review of System:   General: no chills, fever, night sweats, weight gain or weight loss  Psychological: no depression or suicidal ideation  Breasts: no new or changing breast lumps, nipple discharge or masses.  Respiratory: no cough, shortness of breath, or wheezing  Cardiovascular: no chest pain or dyspnea on exertion  Gastrointestinal: no abdominal pain, change in bowel habits, or  black or bloody stools  Genito-Urinary: no incontinence, urinary frequency/urgency or vulvar/vaginal symptoms, pelvic pain. OCCASIONAL BTB  Musculoskeletal: no gait disturbance or muscular weakness    Plan  Will refillOCPs   RTO 1 year/prn

## 2024-07-01 ENCOUNTER — OFFICE VISIT (OUTPATIENT)
Dept: OBSTETRICS AND GYNECOLOGY | Facility: CLINIC | Age: 24
End: 2024-07-01
Payer: COMMERCIAL

## 2024-07-01 ENCOUNTER — LAB VISIT (OUTPATIENT)
Dept: LAB | Facility: HOSPITAL | Age: 24
End: 2024-07-01
Attending: SPECIALIST
Payer: COMMERCIAL

## 2024-07-01 VITALS
BODY MASS INDEX: 38.32 KG/M2 | DIASTOLIC BLOOD PRESSURE: 68 MMHG | WEIGHT: 237.44 LBS | SYSTOLIC BLOOD PRESSURE: 104 MMHG

## 2024-07-01 DIAGNOSIS — Z34.90 EARLY STAGE OF PREGNANCY: ICD-10-CM

## 2024-07-01 DIAGNOSIS — Z11.3 SCREEN FOR STD (SEXUALLY TRANSMITTED DISEASE): ICD-10-CM

## 2024-07-01 DIAGNOSIS — N91.0 DELAYED MENSES: Primary | ICD-10-CM

## 2024-07-01 LAB
B-HCG UR QL: POSITIVE
CTP QC/QA: YES
HCG INTACT+B SERPL-ACNC: 281 MIU/ML
PROGEST SERPL-MCNC: 8.1 NG/ML

## 2024-07-01 PROCEDURE — 36415 COLL VENOUS BLD VENIPUNCTURE: CPT | Mod: PN | Performed by: SPECIALIST

## 2024-07-01 PROCEDURE — 87491 CHLMYD TRACH DNA AMP PROBE: CPT | Performed by: SPECIALIST

## 2024-07-01 PROCEDURE — 84702 CHORIONIC GONADOTROPIN TEST: CPT | Performed by: SPECIALIST

## 2024-07-01 PROCEDURE — 99999 PR PBB SHADOW E&M-EST. PATIENT-LVL III: CPT | Mod: PBBFAC,,, | Performed by: SPECIALIST

## 2024-07-01 PROCEDURE — 84144 ASSAY OF PROGESTERONE: CPT | Performed by: SPECIALIST

## 2024-07-01 NOTE — PROGRESS NOTES
22 yo WF [id number] previous  x 1 presents for initialPNC LMP  Light positive UPTtoday  Past Medical History:   Diagnosis Date    Iron deficiency anemia 2018       Past Surgical History:   Procedure Laterality Date     SECTION N/A 2021    Procedure:  SECTION;  Surgeon: Rafael Rodriguez MD;  Location: [facility] L&D;  Service: OB/GYN;  Laterality: N/A;    LAPAROSCOPIC CHOLECYSTECTOMY N/A 2023    Procedure: CHOLECYSTECTOMY, LAPAROSCOPIC;  Surgeon: Trav Thomson MD;  Location: [facility];  Service: General;  Laterality: N/A;       Family History   Problem Relation Name Age of Onset    No Known Problems Mother      No Known Problems Father      Breast cancer Neg Hx      Ovarian cancer Neg Hx         Social History     Socioeconomic History    Marital status: Single   Tobacco Use    Smoking status: Some Days     Types: Vaping with nicotine    Smokeless tobacco: Never   Substance and Sexual Activity    Alcohol use: No    Drug use: No    Sexual activity: Yes     Partners: Male     Birth control/protection: None     Social Determinants of Health     Financial Resource Strain: High Risk (2024)    Overall Financial Resource Strain (CARDIA)     Difficulty of Paying Living Expenses: Hard   Food Insecurity: Food Insecurity Present (2024)    Hunger Vital Sign     Worried About Running Out of Food in the Last Year: Sometimes true     Ran Out of Food in the Last Year: Sometimes true   Transportation Needs: No Transportation Needs (2024)    PRAPARE - Transportation     Lack of Transportation (Medical): No     Lack of Transportation (Non-Medical): No   Physical Activity: Sufficiently Active (2024)    Exercise Vital Sign     Days of Exercise per Week: 4 days     Minutes of Exercise per Session: 60 min   Stress: Stress Concern Present (2024)    [employer] [place] - Occupational Stress Questionnaire     Feeling of Stress : Very much   Housing  Stability: High Risk (1/18/2024)    Housing Stability Vital Sign     Unable to Pay for Housing in the Last Year: Yes     Number of Places Lived in the Last Year: 2     Unstable Housing in the Last Year: No       Current Outpatient Medications   Medication Sig Dispense Refill    EScitalopram oxalate (LEXAPRO) 5 MG Tab Take 1 tablet (5 mg total) by mouth once daily. 30 tablet 11    pramoxine-hydrocortisone (PROCTOCREAM-HC) 1-1 % rectal cream Place rectally 3 (three) times daily. (Patient not taking: Reported on 7/1/2024) 30 g 0     No current facility-administered medications for this visit.       Review of patient's allergies indicates:  No Known Allergies    Review of System:   General: no chills, fever, night sweats, weight gain or weight loss  Psychological: no depression or suicidal ideation  Breasts: no new or changing breast lumps, nipple discharge or masses.  Respiratory: no cough, shortness of breath, or wheezing  Cardiovascular: no chest pain or dyspnea on exertion  Gastrointestinal: no abdominal pain, change in bowel habits, or black or bloody stools  Genito-Urinary: no incontinence, urinary frequency/urgency or vulvar/vaginal symptoms, pelvic pain or abnormal vaginal bleeding.  Musculoskeletal: no gait disturbance or muscular weakness                                               General Appearance    A and O x 4, Cooperative, no distress   Breasts    Abdomen   deferred  Soft, non-tender, bowel sounds active all four quadrants,  no masses, no organomegaly    Genitourinary:   External rectal exam shows no thrombosed external hemorrhoids.   Pelvic exam was performed with patient supine.  No labial fusion.  There is no rash, lesion or injury on the right labia.  There is no rash, lesion or injury on the left labia.  No bleeding and no signs of injury around the vaginal introitus, urethra is without lesions and well supported. The cervix is visualized with no discharge, lesions or friability.  No vaginal  discharge found.  No significant Cystocele, Enterocele or rectocele, and uterus well supported.  Bimanual exam:  The urethra is normal to palpation and there are no palpable vaginal wall masses.  Uterus is not deviated, not enlarged, not fixed, normal shape and not tender.  Cervix exhibits no motion tenderness.   Right adnexum displays no mass and no tenderness.  Left adnexum displays no mass and no tenderness.   Extremities: Extremities normal, atraumatic, no cyanosis or edema                     NOTE  NURSING PERSONAL PRESENT FOR ENTIRE PHYSICAL EXAM     Procedure TVS 6.5Mhz probe Uterus 6.5 x 5.1 x 4.1cm Positive decidual reaction No FP No GS no free fluid noted or extrauterine masses  I discussed   I discussed potential early gestation with unconfirmed intrauterine preg  I will obtain BHCG and prog  Ectopic and bleeding precations given  Answered all questions and will follow    I spent a total of 30 minutes on the day of the visit. This includes face to face time and non-face to face time preparing to see the patient (eg, review of tests), obtaining and/or reviewing separately obtained history, documenting clinical information in the electronic or other health record, independently interpreting results and communicating results to the patient/family/caregiver, or care coordinator.

## 2024-07-02 ENCOUNTER — PATIENT MESSAGE (OUTPATIENT)
Dept: OBSTETRICS AND GYNECOLOGY | Facility: CLINIC | Age: 24
End: 2024-07-02
Payer: COMMERCIAL

## 2024-07-02 DIAGNOSIS — Z34.90 EARLY STAGE OF PREGNANCY: Primary | ICD-10-CM

## 2024-07-02 LAB
C TRACH DNA SPEC QL NAA+PROBE: NOT DETECTED
N GONORRHOEA DNA SPEC QL NAA+PROBE: NOT DETECTED

## 2024-07-03 ENCOUNTER — LAB VISIT (OUTPATIENT)
Dept: LAB | Facility: HOSPITAL | Age: 24
End: 2024-07-03
Attending: SPECIALIST
Payer: COMMERCIAL

## 2024-07-03 DIAGNOSIS — Z34.90 EARLY STAGE OF PREGNANCY: ICD-10-CM

## 2024-07-03 LAB — HCG INTACT+B SERPL-ACNC: 724 MIU/ML

## 2024-07-03 PROCEDURE — 84702 CHORIONIC GONADOTROPIN TEST: CPT | Performed by: SPECIALIST

## 2024-07-03 PROCEDURE — 36415 COLL VENOUS BLD VENIPUNCTURE: CPT | Mod: PO | Performed by: SPECIALIST

## 2024-07-08 DIAGNOSIS — Z34.90 EARLY STAGE OF PREGNANCY: Primary | ICD-10-CM

## 2024-07-17 ENCOUNTER — TELEPHONE (OUTPATIENT)
Dept: OBSTETRICS AND GYNECOLOGY | Facility: CLINIC | Age: 24
End: 2024-07-17
Payer: COMMERCIAL

## 2024-07-17 ENCOUNTER — HOSPITAL ENCOUNTER (OUTPATIENT)
Dept: RADIOLOGY | Facility: HOSPITAL | Age: 24
Discharge: HOME | End: 2024-07-17
Attending: SPECIALIST
Payer: COMMERCIAL

## 2024-07-17 DIAGNOSIS — Z34.90 EARLY STAGE OF PREGNANCY: ICD-10-CM

## 2024-07-17 DIAGNOSIS — Z34.90 EARLY STAGE OF PREGNANCY: Primary | ICD-10-CM

## 2024-07-17 PROCEDURE — 76817 TRANSVAGINAL US OBSTETRIC: CPT | Mod: TC,PN

## 2024-07-17 PROCEDURE — 76817 TRANSVAGINAL US OBSTETRIC: CPT | Mod: 26,,, | Performed by: RADIOLOGY

## 2024-07-17 PROCEDURE — 76801 OB US < 14 WKS SINGLE FETUS: CPT | Mod: 26,,, | Performed by: RADIOLOGY

## 2024-07-17 NOTE — TELEPHONE ENCOUNTER
----- Message from Winnie Sullivan sent at 7/17/2024  1:05 PM CDT -----  Contact: Pt Mother Ellen Bailey  Type: Needs Medical Advice         Who Called:Pt Mother Ellen Bailey  Best Call Back Number: [phone]   Additional Information: Requesting a call back regarding  Pt asked mother to call due to her being upset over news. Mother is asking for the office to call her please,   Please Advise- Thank you

## 2024-07-18 ENCOUNTER — PATIENT MESSAGE (OUTPATIENT)
Dept: OBSTETRICS AND GYNECOLOGY | Facility: CLINIC | Age: 24
End: 2024-07-18
Payer: COMMERCIAL

## 2024-07-18 ENCOUNTER — LAB VISIT (OUTPATIENT)
Dept: LAB | Facility: HOSPITAL | Age: 24
End: 2024-07-18
Attending: SPECIALIST
Payer: COMMERCIAL

## 2024-07-18 DIAGNOSIS — Z34.90 EARLY STAGE OF PREGNANCY: ICD-10-CM

## 2024-07-18 LAB — HCG INTACT+B SERPL-ACNC: NORMAL MIU/ML

## 2024-07-18 PROCEDURE — 84702 CHORIONIC GONADOTROPIN TEST: CPT | Performed by: SPECIALIST

## 2024-07-18 PROCEDURE — 36415 COLL VENOUS BLD VENIPUNCTURE: CPT | Mod: PO | Performed by: SPECIALIST

## 2024-07-24 ENCOUNTER — PATIENT MESSAGE (OUTPATIENT)
Dept: OBSTETRICS AND GYNECOLOGY | Facility: CLINIC | Age: 24
End: 2024-07-24
Payer: COMMERCIAL

## 2024-07-29 ENCOUNTER — ROUTINE PRENATAL (OUTPATIENT)
Dept: OBSTETRICS AND GYNECOLOGY | Facility: CLINIC | Age: 24
End: 2024-07-29
Payer: COMMERCIAL

## 2024-07-29 ENCOUNTER — TELEPHONE (OUTPATIENT)
Dept: FAMILY MEDICINE | Facility: CLINIC | Age: 24
End: 2024-07-29
Payer: COMMERCIAL

## 2024-07-29 ENCOUNTER — LAB VISIT (OUTPATIENT)
Dept: LAB | Facility: HOSPITAL | Age: 24
End: 2024-07-29
Attending: OBSTETRICS & GYNECOLOGY
Payer: COMMERCIAL

## 2024-07-29 VITALS
BODY MASS INDEX: 38.15 KG/M2 | SYSTOLIC BLOOD PRESSURE: 112 MMHG | DIASTOLIC BLOOD PRESSURE: 68 MMHG | WEIGHT: 236.31 LBS

## 2024-07-29 DIAGNOSIS — N92.4 MENORRHAGIA, PREMENOPAUSAL: ICD-10-CM

## 2024-07-29 DIAGNOSIS — Z34.90 EARLY STAGE OF PREGNANCY: Primary | ICD-10-CM

## 2024-07-29 DIAGNOSIS — Z3A.09 9 WEEKS GESTATION OF PREGNANCY: ICD-10-CM

## 2024-07-29 DIAGNOSIS — D50.0 IRON DEFICIENCY ANEMIA DUE TO CHRONIC BLOOD LOSS: ICD-10-CM

## 2024-07-29 LAB
ABO + RH BLD: NORMAL
BASOPHILS # BLD AUTO: 0.04 K/UL (ref 0–0.2)
BASOPHILS # BLD AUTO: 0.04 K/UL (ref 0–0.2)
BASOPHILS NFR BLD: 0.6 % (ref 0–1.9)
BASOPHILS NFR BLD: 0.6 % (ref 0–1.9)
BILIRUBIN, UA POC OHS: NEGATIVE
BLD GP AB SCN CELLS X3 SERPL QL: NORMAL
BLOOD, UA POC OHS: NEGATIVE
CLARITY, UA POC OHS: CLEAR
COLOR, UA POC OHS: YELLOW
DIFFERENTIAL METHOD BLD: ABNORMAL
DIFFERENTIAL METHOD BLD: ABNORMAL
EOSINOPHIL # BLD AUTO: 0.1 K/UL (ref 0–0.5)
EOSINOPHIL # BLD AUTO: 0.1 K/UL (ref 0–0.5)
EOSINOPHIL NFR BLD: 1.5 % (ref 0–8)
EOSINOPHIL NFR BLD: 1.5 % (ref 0–8)
ERYTHROCYTE [DISTWIDTH] IN BLOOD BY AUTOMATED COUNT: 20.8 % (ref 11.5–14.5)
ERYTHROCYTE [DISTWIDTH] IN BLOOD BY AUTOMATED COUNT: 20.8 % (ref 11.5–14.5)
FERRITIN SERPL-MCNC: 3 NG/ML (ref 20–300)
GLUCOSE, UA POC OHS: NEGATIVE
HBV SURFACE AG SERPL QL IA: NORMAL
HCT VFR BLD AUTO: 24.5 % (ref 37–48.5)
HCT VFR BLD AUTO: 24.5 % (ref 37–48.5)
HCV AB SERPL QL IA: NORMAL
HGB BLD-MCNC: 6 G/DL (ref 12–16)
HGB BLD-MCNC: 6 G/DL (ref 12–16)
HIV 1+2 AB+HIV1 P24 AG SERPL QL IA: NORMAL
IMM GRANULOCYTES # BLD AUTO: 0.02 K/UL (ref 0–0.04)
IMM GRANULOCYTES # BLD AUTO: 0.02 K/UL (ref 0–0.04)
IMM GRANULOCYTES NFR BLD AUTO: 0.3 % (ref 0–0.5)
IMM GRANULOCYTES NFR BLD AUTO: 0.3 % (ref 0–0.5)
KETONES, UA POC OHS: NEGATIVE
LEUKOCYTES, UA POC OHS: ABNORMAL
LYMPHOCYTES # BLD AUTO: 1.3 K/UL (ref 1–4.8)
LYMPHOCYTES # BLD AUTO: 1.3 K/UL (ref 1–4.8)
LYMPHOCYTES NFR BLD: 19.5 % (ref 18–48)
LYMPHOCYTES NFR BLD: 19.5 % (ref 18–48)
MCH RBC QN AUTO: 14.6 PG (ref 27–31)
MCH RBC QN AUTO: 14.6 PG (ref 27–31)
MCHC RBC AUTO-ENTMCNC: 24.5 G/DL (ref 32–36)
MCHC RBC AUTO-ENTMCNC: 24.5 G/DL (ref 32–36)
MCV RBC AUTO: 60 FL (ref 82–98)
MCV RBC AUTO: 60 FL (ref 82–98)
MONOCYTES # BLD AUTO: 0.6 K/UL (ref 0.3–1)
MONOCYTES # BLD AUTO: 0.6 K/UL (ref 0.3–1)
MONOCYTES NFR BLD: 9.2 % (ref 4–15)
MONOCYTES NFR BLD: 9.2 % (ref 4–15)
NEUTROPHILS # BLD AUTO: 4.6 K/UL (ref 1.8–7.7)
NEUTROPHILS # BLD AUTO: 4.6 K/UL (ref 1.8–7.7)
NEUTROPHILS NFR BLD: 68.9 % (ref 38–73)
NEUTROPHILS NFR BLD: 68.9 % (ref 38–73)
NITRITE, UA POC OHS: NEGATIVE
NRBC BLD-RTO: 0 /100 WBC
NRBC BLD-RTO: 0 /100 WBC
PH, UA POC OHS: 5.5
PLATELET # BLD AUTO: 362 K/UL (ref 150–450)
PLATELET # BLD AUTO: 362 K/UL (ref 150–450)
PMV BLD AUTO: 10 FL (ref 9.2–12.9)
PMV BLD AUTO: 10 FL (ref 9.2–12.9)
PROTEIN, UA POC OHS: NEGATIVE
RBC # BLD AUTO: 4.11 M/UL (ref 4–5.4)
RBC # BLD AUTO: 4.11 M/UL (ref 4–5.4)
SPECIFIC GRAVITY, UA POC OHS: >=1.03
TREPONEMA PALLIDUM IGG+IGM AB [PRESENCE] IN SERUM OR PLASMA BY IMMUNOASSAY: NONREACTIVE
TSH SERPL DL<=0.005 MIU/L-ACNC: 3.18 UIU/ML (ref 0.4–4)
UROBILINOGEN, UA POC OHS: 1
WBC # BLD AUTO: 6.73 K/UL (ref 3.9–12.7)
WBC # BLD AUTO: 6.73 K/UL (ref 3.9–12.7)

## 2024-07-29 PROCEDURE — 86900 BLOOD TYPING SEROLOGIC ABO: CPT | Performed by: SPECIALIST

## 2024-07-29 PROCEDURE — 86803 HEPATITIS C AB TEST: CPT | Performed by: SPECIALIST

## 2024-07-29 PROCEDURE — 86593 SYPHILIS TEST NON-TREP QUANT: CPT | Performed by: SPECIALIST

## 2024-07-29 PROCEDURE — 84443 ASSAY THYROID STIM HORMONE: CPT | Performed by: SPECIALIST

## 2024-07-29 PROCEDURE — 87340 HEPATITIS B SURFACE AG IA: CPT | Performed by: SPECIALIST

## 2024-07-29 PROCEDURE — 86850 RBC ANTIBODY SCREEN: CPT | Performed by: SPECIALIST

## 2024-07-29 PROCEDURE — 87389 HIV-1 AG W/HIV-1&-2 AB AG IA: CPT | Performed by: SPECIALIST

## 2024-07-29 PROCEDURE — 86762 RUBELLA ANTIBODY: CPT | Performed by: SPECIALIST

## 2024-07-29 PROCEDURE — 99999 PR PBB SHADOW E&M-EST. PATIENT-LVL III: CPT | Mod: PBBFAC,,, | Performed by: SPECIALIST

## 2024-07-29 PROCEDURE — 82728 ASSAY OF FERRITIN: CPT | Performed by: INTERNAL MEDICINE

## 2024-07-29 PROCEDURE — 36415 COLL VENOUS BLD VENIPUNCTURE: CPT | Mod: PN | Performed by: SPECIALIST

## 2024-07-29 PROCEDURE — 85025 COMPLETE CBC W/AUTO DIFF WBC: CPT | Performed by: FAMILY MEDICINE

## 2024-07-29 NOTE — TELEPHONE ENCOUNTER
----- Message from Alok Angeles MD sent at 7/29/2024  4:08 PM CDT -----  Patient remains profoundly anemic followed by gyn and Dr. Flores currently pregnant.  Follow-up with gyn as scheduled.

## 2024-07-29 NOTE — PROGRESS NOTES
Complaints today:none , No Bleeding or pains    Procedure TVS 6.5Mhz probe  Positive IUP Dewitt CRL 8w6d , viewed vby pt EGA 9w2d by LMP EDC 3/1/25    /68   Wt 107.2 kg (236 lb 5.3 oz)   LMP 2024   BMI 38.15 kg/m²     23 y.o., at 9w2d by Estimated Date of Delivery: 3/1/25  Patient Active Problem List   Diagnosis    Iron deficiency anemia    Iron deficiency anemia due to chronic blood loss    Carpal tunnel syndrome, right    Anxiety and depression    Engorgement of breasts, postpartum condition or complication    Bilateral pulmonary embolism    Bilateral pulmonary embolism    Acute septic pulmonary embolism with acute cor pulmonale    Menorrhagia, premenopausal    Gallstones    Menorrhagia with irregular cycle     OB History    Para Term  AB Living   3 1 1 0 1 1   SAB IAB Ectopic Multiple Live Births   1 0 0 0 1      # Outcome Date GA Lbr Jacobo/2nd Weight Sex Type Anes PTL Lv   3 Current            2 Term 21 39w0d  3.756 kg (8 lb 4.5 oz) M CS-LTranv Spinal  ANDRZEJ   1 SAB                Dating reviewed    Allergies and problem list reviewed and updated    Medical and surgical history reviewed    Prenatal labs reviewed and updated    Physical Exam:  ABD: soft, gravid, nontender,     Assessment:  IUP 9w2d  Previous     Plan:   PNP  Continue PNV daily  follow up 4 Weeks, bleeding/pain precautions

## 2024-07-29 NOTE — PROGRESS NOTES
Patient remains profoundly anemic followed by gyn and Dr. Flores currently pregnant.  Follow-up with gyn as scheduled.

## 2024-07-30 ENCOUNTER — LAB VISIT (OUTPATIENT)
Dept: LAB | Facility: HOSPITAL | Age: 24
End: 2024-07-30
Attending: INTERNAL MEDICINE
Payer: COMMERCIAL

## 2024-07-30 ENCOUNTER — TELEPHONE (OUTPATIENT)
Facility: CLINIC | Age: 24
End: 2024-07-30

## 2024-07-30 ENCOUNTER — OFFICE VISIT (OUTPATIENT)
Facility: CLINIC | Age: 24
End: 2024-07-30
Payer: COMMERCIAL

## 2024-07-30 ENCOUNTER — TELEPHONE (OUTPATIENT)
Facility: CLINIC | Age: 24
End: 2024-07-30
Payer: COMMERCIAL

## 2024-07-30 VITALS
BODY MASS INDEX: 38.09 KG/M2 | TEMPERATURE: 98 F | RESPIRATION RATE: 18 BRPM | DIASTOLIC BLOOD PRESSURE: 60 MMHG | HEIGHT: 66 IN | HEART RATE: 95 BPM | SYSTOLIC BLOOD PRESSURE: 110 MMHG | WEIGHT: 237 LBS

## 2024-07-30 DIAGNOSIS — D50.0 IRON DEFICIENCY ANEMIA DUE TO CHRONIC BLOOD LOSS: Primary | ICD-10-CM

## 2024-07-30 DIAGNOSIS — I26.99 BILATERAL PULMONARY EMBOLISM: ICD-10-CM

## 2024-07-30 DIAGNOSIS — D50.0 IRON DEFICIENCY ANEMIA DUE TO CHRONIC BLOOD LOSS: ICD-10-CM

## 2024-07-30 LAB
ABO + RH BLD: NORMAL
BLD GP AB SCN CELLS X3 SERPL QL: NORMAL
RUBV IGG SER-ACNC: 26.6 IU/ML
RUBV IGG SER-IMP: REACTIVE
SPECIMEN OUTDATE: NORMAL

## 2024-07-30 PROCEDURE — 86850 RBC ANTIBODY SCREEN: CPT | Performed by: INTERNAL MEDICINE

## 2024-07-30 PROCEDURE — 3078F DIAST BP <80 MM HG: CPT | Mod: CPTII,S$GLB,, | Performed by: INTERNAL MEDICINE

## 2024-07-30 PROCEDURE — G2211 COMPLEX E/M VISIT ADD ON: HCPCS | Mod: S$GLB,,, | Performed by: INTERNAL MEDICINE

## 2024-07-30 PROCEDURE — 36415 COLL VENOUS BLD VENIPUNCTURE: CPT | Performed by: INTERNAL MEDICINE

## 2024-07-30 PROCEDURE — 86900 BLOOD TYPING SEROLOGIC ABO: CPT | Performed by: INTERNAL MEDICINE

## 2024-07-30 PROCEDURE — 86901 BLOOD TYPING SEROLOGIC RH(D): CPT | Performed by: INTERNAL MEDICINE

## 2024-07-30 PROCEDURE — 99999 PR PBB SHADOW E&M-EST. PATIENT-LVL III: CPT | Mod: PBBFAC,,, | Performed by: INTERNAL MEDICINE

## 2024-07-30 PROCEDURE — 3074F SYST BP LT 130 MM HG: CPT | Mod: CPTII,S$GLB,, | Performed by: INTERNAL MEDICINE

## 2024-07-30 PROCEDURE — 99215 OFFICE O/P EST HI 40 MIN: CPT | Mod: S$GLB,,, | Performed by: INTERNAL MEDICINE

## 2024-07-30 PROCEDURE — 3008F BODY MASS INDEX DOCD: CPT | Mod: CPTII,S$GLB,, | Performed by: INTERNAL MEDICINE

## 2024-07-30 RX ORDER — ENOXAPARIN SODIUM 100 MG/ML
40 INJECTION SUBCUTANEOUS DAILY
Qty: 30 EACH | Refills: 6 | Status: SHIPPED | OUTPATIENT
Start: 2024-07-30

## 2024-07-30 RX ORDER — HYDROCODONE BITARTRATE AND ACETAMINOPHEN 500; 5 MG/1; MG/1
TABLET ORAL ONCE
Status: CANCELLED | OUTPATIENT
Start: 2024-07-30 | End: 2024-07-30

## 2024-07-30 RX ORDER — FERROUS SULFATE 325(65) MG
325 TABLET ORAL DAILY
Qty: 30 TABLET | Refills: 0 | Status: SHIPPED | OUTPATIENT
Start: 2024-07-30

## 2024-07-30 RX ORDER — DIPHENHYDRAMINE HYDROCHLORIDE 50 MG/ML
25 INJECTION INTRAMUSCULAR; INTRAVENOUS
Status: CANCELLED | OUTPATIENT
Start: 2024-07-30

## 2024-07-30 RX ORDER — ACETAMINOPHEN 325 MG/1
650 TABLET ORAL
Status: CANCELLED | OUTPATIENT
Start: 2024-07-30

## 2024-07-30 NOTE — TELEPHONE ENCOUNTER
----- Message from Frandy Whitehead MD sent at 7/30/2024  8:40 AM CDT -----  Call patient , see how she is doing.  No blood yet.  Get her an appointment with me      Patient is coming in today to see Dr. Whitehead

## 2024-07-30 NOTE — ASSESSMENT & PLAN NOTE
I discussed this today.  She has had a severe PE in 2021.  Will place her on Lovenox right away and discussed this today.  Reviewed the high risk of blood clots and pregnancy and discussed that this can be a life-threatening event.

## 2024-07-30 NOTE — PROGRESS NOTES
PROGRESS NOTE    Subjective:       Patient ID: Ricardo Rivera is a 23 y.o. female.    10/25/2021:  CTA chest:  Extensive bilateral PE   Thought due to OCPs  Eliquis given for 4 months.     PLAN as of 1/19/2022: IV iron weekly x 8, weekly CBC and will need choly when above 10    2/2/2023:  Choly done-path c/w chronic cholecystitis     4/13/2023:  Completed IV iron x 8.     Chief Complaint:  No chief complaint on file.  Pregnancy---Iron def anemia and history of PE    History of Present Illness:   Ricardo Rivera is a 23 y.o. female who presents for follow of of above.      Patient is now 9 weeks pregnant and I had her come in due to a very low Hb of 6g/dl.  She has occasional dizziness but o/w doing ok.     No new complaints.       PMH:  Patient diagnosed with extensive PE October 2021.  This was the only clotting problem she has had and this was attributed to use of birth control at that time. [place] Eliquis for 4 months.      PMH-12/23/2022  Ms. Rivera is a 21yo female who was diagnosed with iron deficiency anemia at 15 years of age.  She was seeing pediatric oncology at [facility] since 15 years old.  This was attributed to heavy menses and she was placed on birth control.  She unfortunately diagnosed with pulmonary embolism in 2021 and this was discontinued.   She has historically been treated with iron supplementation with oral iron but this makes her very constipated so she has discontinued this.  She has been treated with IV iron on 3 different occasions and has not had any allergic issues with this.         Family and Social history reviewed and is unchanged from 12/23/2022      ROS:  Review of Systems   Constitutional:  Negative for fever.   Respiratory:  Negative for shortness of breath.    Cardiovascular:  Negative for chest pain and leg swelling.   Gastrointestinal:  Negative for abdominal pain and blood in stool.  "  Genitourinary:  Negative for hematuria.   Skin:  Negative for rash.          Current Outpatient Medications:     enoxaparin (LOVENOX) 40 mg/0.4 mL Syrg, Inject 0.4 mLs (40 mg total) into the skin once daily., Disp: 30 each, Rfl: 6    EScitalopram oxalate (LEXAPRO) 5 MG Tab, Take 1 tablet (5 mg total) by mouth once daily. (Patient not taking: Reported on 7/29/2024), Disp: 30 tablet, Rfl: 11    ferrous sulfate (IRON, FERROUS SULFATE,) 325 mg (65 mg iron) Tab tablet, Take 1 tablet (325 mg total) by mouth once daily., Disp: 30 tablet, Rfl: 0    pramoxine-hydrocortisone (PROCTOCREAM-HC) 1-1 % rectal cream, Place rectally 3 (three) times daily. (Patient not taking: Reported on 7/1/2024), Disp: 30 g, Rfl: 0        Objective:       Physical Examination:     /60 (BP Location: Right arm)   Pulse 95   Temp 97.9 °F (36.6 °C)   Resp 18   Ht 5' 6" (1.676 m)   Wt 107.5 kg (237 lb)   LMP 05/25/2024   BMI 38.25 kg/m²     Physical Exam  Constitutional:       Appearance: She is well-developed.   HENT:      Head: Normocephalic and atraumatic.      Right Ear: External ear normal.      Left Ear: External ear normal.   Eyes:      Conjunctiva/sclera: Conjunctivae normal.      Pupils: Pupils are equal, round, and reactive to light.   Neck:      Thyroid: No thyromegaly.      Trachea: No tracheal deviation.   Cardiovascular:      Rate and Rhythm: Normal rate and regular rhythm.      Heart sounds: Normal heart sounds.   Pulmonary:      Effort: Pulmonary effort is normal.      Breath sounds: Normal breath sounds.   Abdominal:      General: Bowel sounds are normal. There is no distension.      Palpations: Abdomen is soft. There is no mass.      Tenderness: There is no abdominal tenderness.   Skin:     Findings: No rash.   Neurological:      Comments: Neuro intact througout   Psychiatric:         Behavior: Behavior normal.         Thought Content: Thought content normal.         Judgment: Judgment normal.         Labs:   Recent " Results (from the past 336 hour(s))   CBC Auto Differential    Collection Time: 07/29/24 10:17 AM   Result Value Ref Range    WBC 6.73 3.90 - 12.70 K/uL    Hemoglobin 6.0 (L) 12.0 - 16.0 g/dL    Hematocrit 24.5 (L) 37.0 - 48.5 %    Platelets 362 150 - 450 K/uL   CBC W/ AUTO DIFFERENTIAL    Collection Time: 07/29/24 10:17 AM   Result Value Ref Range    WBC 6.73 3.90 - 12.70 K/uL    Hemoglobin 6.0 (L) 12.0 - 16.0 g/dL    Hematocrit 24.5 (L) 37.0 - 48.5 %    Platelets 362 150 - 450 K/uL       CMP  Sodium   Date Value Ref Range Status   03/05/2024 139 136 - 145 mmol/L Final     Potassium   Date Value Ref Range Status   03/05/2024 4.2 3.5 - 5.1 mmol/L Final     Chloride   Date Value Ref Range Status   03/05/2024 110 95 - 110 mmol/L Final     CO2   Date Value Ref Range Status   03/05/2024 18 (L) 23 - 29 mmol/L Final     Glucose   Date Value Ref Range Status   03/05/2024 86 70 - 110 mg/dL Final     BUN   Date Value Ref Range Status   03/05/2024 8 6 - 20 mg/dL Final     Creatinine   Date Value Ref Range Status   03/05/2024 0.8 0.5 - 1.4 mg/dL Final     Calcium   Date Value Ref Range Status   03/05/2024 9.4 8.7 - 10.5 mg/dL Final     Total Protein   Date Value Ref Range Status   03/05/2024 7.5 6.0 - 8.4 g/dL Final     Albumin   Date Value Ref Range Status   03/05/2024 4.1 3.5 - 5.2 g/dL Final     Total Bilirubin   Date Value Ref Range Status   03/05/2024 0.9 0.1 - 1.0 mg/dL Final     Comment:     For infants and newborns, interpretation of results should be based  on gestational age, weight and in agreement with clinical  observations.    Premature Infant recommended reference ranges:  Up to 24 hours.............<8.0 mg/dL  Up to 48 hours............<12.0 mg/dL  3-5 days..................<15.0 mg/dL  6-29 days.................<15.0 mg/dL       Alkaline Phosphatase   Date Value Ref Range Status   03/05/2024 61 55 - 135 U/L Final     AST   Date Value Ref Range Status   03/05/2024 15 10 - 40 U/L Final     ALT   Date Value Ref  "Range Status   03/05/2024 11 10 - 44 U/L Final     Anion Gap   Date Value Ref Range Status   03/05/2024 11 8 - 16 mmol/L Final     eGFR if    Date Value Ref Range Status   10/27/2021 >60 >60 mL/min/1.73 m^2 Final     eGFR if non    Date Value Ref Range Status   10/27/2021 >60 >60 mL/min/1.73 m^2 Final     Comment:     Calculation used to obtain the estimated glomerular filtration  rate (eGFR) is the CKD-EPI equation.        No results found for: "CEA"  No results found for: "PSA"        Assessment/Plan:     Problem List Items Addressed This Visit       Iron deficiency anemia due to chronic blood loss - Primary     Patient is now 9 weeks pregnant and profoundly anemic, symptomatic and iron deficient.  She needs  a blood transfusion now.  I cannot give IV iron in the 1st TM and the oral works much too slowly.  She also has constipation issues with this medication.  She will try one daily but plan will be to give her IV iron in the 2nd TM.           Relevant Medications    ferrous sulfate (IRON, FERROUS SULFATE,) 325 mg (65 mg iron) Tab tablet    Other Relevant Orders    CBC Auto Differential    Comprehensive Metabolic Panel    Bilateral pulmonary embolism     I discussed this today.  She has had a severe PE in 2021.  Will place her on Lovenox right away and discussed this today.  Reviewed the high risk of blood clots and pregnancy and discussed that this can be a life-threatening event.           Relevant Medications    enoxaparin (LOVENOX) 40 mg/0.4 mL Syrg       Discussion:     Follow up in about 6 weeks (around 9/10/2024).      Electronically signed by Frandy Flores        "

## 2024-07-30 NOTE — ASSESSMENT & PLAN NOTE
Patient is now 9 weeks pregnant and profoundly anemic, symptomatic and iron deficient.  She needs  a blood transfusion now.  I cannot give IV iron in the 1st TM and the oral works much too slowly.  She also has constipation issues with this medication.  She will try one daily but plan will be to give her IV iron in the 2nd TM.

## 2024-07-31 ENCOUNTER — INFUSION (OUTPATIENT)
Dept: INFUSION THERAPY | Facility: HOSPITAL | Age: 24
End: 2024-07-31
Attending: INTERNAL MEDICINE
Payer: COMMERCIAL

## 2024-07-31 VITALS
HEIGHT: 66 IN | RESPIRATION RATE: 16 BRPM | TEMPERATURE: 98 F | DIASTOLIC BLOOD PRESSURE: 52 MMHG | HEART RATE: 76 BPM | WEIGHT: 237.31 LBS | BODY MASS INDEX: 38.14 KG/M2 | OXYGEN SATURATION: 100 % | SYSTOLIC BLOOD PRESSURE: 103 MMHG

## 2024-07-31 DIAGNOSIS — D50.0 IRON DEFICIENCY ANEMIA DUE TO CHRONIC BLOOD LOSS: ICD-10-CM

## 2024-07-31 LAB
BLD PROD TYP BPU: NORMAL
BLD PROD TYP BPU: NORMAL
BLOOD UNIT EXPIRATION DATE: NORMAL
BLOOD UNIT EXPIRATION DATE: NORMAL
BLOOD UNIT TYPE CODE: 5100
BLOOD UNIT TYPE CODE: 5100
BLOOD UNIT TYPE: NORMAL
BLOOD UNIT TYPE: NORMAL
CODING SYSTEM: NORMAL
CODING SYSTEM: NORMAL
CROSSMATCH INTERPRETATION: NORMAL
CROSSMATCH INTERPRETATION: NORMAL
DISPENSE STATUS: NORMAL
DISPENSE STATUS: NORMAL
NUM UNITS TRANS PACKED RBC: NORMAL
NUM UNITS TRANS PACKED RBC: NORMAL

## 2024-07-31 PROCEDURE — P9016 RBC LEUKOCYTES REDUCED: HCPCS | Performed by: INTERNAL MEDICINE

## 2024-07-31 PROCEDURE — 63600175 PHARM REV CODE 636 W HCPCS: Performed by: INTERNAL MEDICINE

## 2024-07-31 PROCEDURE — 36430 TRANSFUSION BLD/BLD COMPNT: CPT

## 2024-07-31 PROCEDURE — 25000003 PHARM REV CODE 250: Performed by: INTERNAL MEDICINE

## 2024-07-31 RX ORDER — DIPHENHYDRAMINE HYDROCHLORIDE 50 MG/ML
25 INJECTION INTRAMUSCULAR; INTRAVENOUS
Status: COMPLETED | OUTPATIENT
Start: 2024-07-31 | End: 2024-07-31

## 2024-07-31 RX ORDER — ACETAMINOPHEN 325 MG/1
650 TABLET ORAL
Status: COMPLETED | OUTPATIENT
Start: 2024-07-31 | End: 2024-07-31

## 2024-07-31 RX ORDER — HYDROCODONE BITARTRATE AND ACETAMINOPHEN 500; 5 MG/1; MG/1
TABLET ORAL ONCE
Status: COMPLETED | OUTPATIENT
Start: 2024-07-31 | End: 2024-07-31

## 2024-07-31 RX ADMIN — DIPHENHYDRAMINE HYDROCHLORIDE 25 MG: 50 INJECTION INTRAMUSCULAR; INTRAVENOUS at 09:07

## 2024-07-31 RX ADMIN — ACETAMINOPHEN 650 MG: 325 TABLET ORAL at 09:07

## 2024-07-31 RX ADMIN — SODIUM CHLORIDE: 9 INJECTION, SOLUTION INTRAVENOUS at 09:07

## 2024-07-31 NOTE — PLAN OF CARE
Problem: Adult Inpatient Plan of Care  Goal: Optimal Comfort and Wellbeing  Outcome: Progressing  Intervention: Provide Person-Centered Care  Flowsheets (Taken 7/31/2024 2114)  Trust Relationship/Rapport:   care explained   choices provided   emotional support provided   empathic listening provided   questions answered   questions encouraged   reassurance provided   thoughts/feelings acknowledged

## 2024-08-30 ENCOUNTER — ROUTINE PRENATAL (OUTPATIENT)
Dept: OBSTETRICS AND GYNECOLOGY | Facility: CLINIC | Age: 24
End: 2024-08-30
Payer: COMMERCIAL

## 2024-08-30 VITALS
BODY MASS INDEX: 38.75 KG/M2 | WEIGHT: 240.06 LBS | SYSTOLIC BLOOD PRESSURE: 104 MMHG | DIASTOLIC BLOOD PRESSURE: 68 MMHG

## 2024-08-30 DIAGNOSIS — Z3A.13 13 WEEKS GESTATION OF PREGNANCY: Primary | ICD-10-CM

## 2024-08-30 LAB
BILIRUBIN, UA POC OHS: NEGATIVE
BLOOD, UA POC OHS: NEGATIVE
CLARITY, UA POC OHS: ABNORMAL
COLOR, UA POC OHS: YELLOW
GLUCOSE, UA POC OHS: NEGATIVE
KETONES, UA POC OHS: NEGATIVE
LEUKOCYTES, UA POC OHS: ABNORMAL
NITRITE, UA POC OHS: NEGATIVE
PH, UA POC OHS: 6.5
PROTEIN, UA POC OHS: NEGATIVE
SPECIFIC GRAVITY, UA POC OHS: >=1.03
UROBILINOGEN, UA POC OHS: 0.2

## 2024-08-30 PROCEDURE — 99999 PR PBB SHADOW E&M-EST. PATIENT-LVL III: CPT | Mod: PBBFAC,,, | Performed by: SPECIALIST

## 2024-08-30 NOTE — PROGRESS NOTES
Complaints today:NONE, No Bleeding or pains  Pt with severe Fe anemia. Pt received 2 units PRBCs and CBC pending. In addition, pt placed on Lovenox due to history PE while on OCP and I again discussed significant risk of thrombosis with pregnancy and importance of compliance Pt states she is NOT currently administering Lovenox daily as prescribed and I explained the importance daily dosing    /68   Wt 108.9 kg (240 lb 1.3 oz)   LMP 2024   BMI 38.75 kg/m²     23 y.o., at 13w6d by Estimated Date of Delivery: 3/1/25  Patient Active Problem List   Diagnosis    Iron deficiency anemia    Iron deficiency anemia due to chronic blood loss    Carpal tunnel syndrome, right    Anxiety and depression    Engorgement of breasts, postpartum condition or complication    Bilateral pulmonary embolism    Bilateral pulmonary embolism    Acute septic pulmonary embolism with acute cor pulmonale    Menorrhagia, premenopausal    Gallstones    Menorrhagia with irregular cycle     OB History    Para Term  AB Living   3 1 1 0 1 1   SAB IAB Ectopic Multiple Live Births   1 0 0 0 1      # Outcome Date GA Lbr Jacobo/2nd Weight Sex Type Anes PTL Lv   3 Current            2 Term 21 39w0d  3.756 kg (8 lb 4.5 oz) M CS-LTranv Spinal  ANDRZEJ   1 SAB                Dating reviewed    Allergies and problem list reviewed and updated    Medical and surgical history reviewed    Prenatal labs reviewed and updated    Physical Exam:  ABD: soft, gravid, nontender,     Assessment:  IUP 13 weeks  Sever Fe def anemia  H/O PE    Plan:   Lovenox 40mg sq daily  Fe infusuin therapy weekly beginning 9/10  Discussed  CFDNA and will offer today  Rereral MFM placed  Follow up Heme/Onc as discussed    follow up 4 Weeks, bleeding/pain precautions

## 2024-09-18 ENCOUNTER — PATIENT MESSAGE (OUTPATIENT)
Dept: OBSTETRICS AND GYNECOLOGY | Facility: CLINIC | Age: 24
End: 2024-09-18
Payer: COMMERCIAL

## 2024-09-21 ENCOUNTER — PATIENT MESSAGE (OUTPATIENT)
Dept: OTHER | Facility: OTHER | Age: 24
End: 2024-09-21
Payer: COMMERCIAL

## 2024-09-24 ENCOUNTER — OFFICE VISIT (OUTPATIENT)
Dept: FAMILY MEDICINE | Facility: CLINIC | Age: 24
End: 2024-09-24
Payer: COMMERCIAL

## 2024-09-24 ENCOUNTER — PATIENT MESSAGE (OUTPATIENT)
Dept: ADMINISTRATIVE | Facility: OTHER | Age: 24
End: 2024-09-24
Payer: COMMERCIAL

## 2024-09-24 VITALS
OXYGEN SATURATION: 99 % | BODY MASS INDEX: 38.32 KG/M2 | TEMPERATURE: 99 F | SYSTOLIC BLOOD PRESSURE: 115 MMHG | WEIGHT: 237.38 LBS | HEART RATE: 98 BPM | DIASTOLIC BLOOD PRESSURE: 73 MMHG

## 2024-09-24 DIAGNOSIS — O22.42 HEMORRHOIDS DURING PREGNANCY IN SECOND TRIMESTER: Primary | ICD-10-CM

## 2024-09-24 PROCEDURE — 1160F RVW MEDS BY RX/DR IN RCRD: CPT | Mod: CPTII,S$GLB,,

## 2024-09-24 PROCEDURE — 1159F MED LIST DOCD IN RCRD: CPT | Mod: CPTII,S$GLB,,

## 2024-09-24 PROCEDURE — 99999 PR PBB SHADOW E&M-EST. PATIENT-LVL IV: CPT | Mod: PBBFAC,,,

## 2024-09-24 PROCEDURE — 3078F DIAST BP <80 MM HG: CPT | Mod: CPTII,S$GLB,,

## 2024-09-24 PROCEDURE — 3008F BODY MASS INDEX DOCD: CPT | Mod: CPTII,S$GLB,,

## 2024-09-24 PROCEDURE — 3074F SYST BP LT 130 MM HG: CPT | Mod: CPTII,S$GLB,,

## 2024-09-24 PROCEDURE — 99213 OFFICE O/P EST LOW 20 MIN: CPT | Mod: S$GLB,,,

## 2024-09-24 RX ORDER — HYDROCORTISONE ACETATE PRAMOXINE HCL 2.5; 1 G/100G; G/100G
CREAM TOPICAL 3 TIMES DAILY
Qty: 30 G | Refills: 0 | Status: SHIPPED | OUTPATIENT
Start: 2024-09-24

## 2024-09-24 RX ORDER — DOCUSATE SODIUM 100 MG/1
100 CAPSULE, LIQUID FILLED ORAL 2 TIMES DAILY
Qty: 180 CAPSULE | Refills: 1 | Status: SHIPPED | OUTPATIENT
Start: 2024-09-24

## 2024-09-24 NOTE — PATIENT INSTRUCTIONS
Recommend daily exercise as tolerated, adequate water intake (six 8-oz glasses of water daily), and high fiber diet. OTC fiber supplements are recommended if diet does not reach daily fiber goal (20-30 grams daily), such as Metamucil, Citrucel, or FiberCon (take as directed, separate from other oral medications by >2 hours).  -Recommend taking an OTC stool softener such as Colace as directed to avoid hard stools and straining with bowel movements PRN  -Recommend trying OTC MiraLax once daily (17g PO) as directed  -If no improvement with above recommendations, try intermittently dosed Dulcolax OTC as directed (every 3-4  days) PRN to facilitate bowel movements  -If still no improvement with these measures, call/follow-up

## 2024-09-24 NOTE — PROGRESS NOTES
SUBJECTIVE:      Patient ID: Ricardo Rivera is a 23 y.o. female.    Chief Complaint: Hemorrhoids (Says this an ongoing problem for years. Noticed some pain 2024.)    Ms. Silva is a 23-year-old female, who is an established patient of Dr. Angeles.  She presents today with complaints of hemorrhoids.  She is currently 17 weeks pregnant.  Patient states that she has been dealing with hemorrhoids since she was a child.  Over the past 2 months she has had increased discomfort and rectal pain resulting in an ER visit. At the emergency room she was given rectal cream that was effective for a few days.  She is now having increased rectal pain.  States at times she has some rectal bleeding but not today.  Reports intermittent constipation.  She has tried MiraLax in the past, which cause increased abdominal discomfort and bloating.  She has not tried stool softeners.        Review of patient's allergies indicates:  No Known Allergies   Past Medical History:   Diagnosis Date    Iron deficiency anemia 2018     Past Surgical History:   Procedure Laterality Date     SECTION N/A 2021    Procedure:  SECTION;  Surgeon: Rafael Rodriguez MD;  Location: [facility] L&D;  Service: OB/GYN;  Laterality: N/A;    LAPAROSCOPIC CHOLECYSTECTOMY N/A 2023    Procedure: CHOLECYSTECTOMY, LAPAROSCOPIC;  Surgeon: Trav Thomson MD;  Location: [facility];  Service: General;  Laterality: N/A;     Current Outpatient Medications   Medication Sig Dispense Refill    enoxaparin (LOVENOX) 40 mg/0.4 mL Syrg Inject 0.4 mLs (40 mg total) into the skin once daily. 30 each 6    pramoxine-hydrocortisone (PROCTOCREAM-HC) 1-1 % rectal cream Place rectally 3 (three) times daily. 30 g 0    docusate sodium (COLACE) 100 MG capsule Take 1 capsule (100 mg total) by mouth 2 (two) times daily. 180 capsule 1    hydrocortisone-pramoxine (ANALPRAM-HC) 2.5-1 % Crea Place rectally 3 (three) times daily. 30 g 0     No current  facility-administered medications for this visit.     Family History   Problem Relation Name Age of Onset    No Known Problems Mother      No Known Problems Father      Breast cancer Neg Hx      Ovarian cancer Neg Hx        Social History     Socioeconomic History    Marital status: Single   Tobacco Use    Smoking status: Former     Types: Vaping with nicotine    Smokeless tobacco: Never   Substance and Sexual Activity    Alcohol use: No    Drug use: No    Sexual activity: Yes     Partners: Male     Birth control/protection: None     Social Determinants of Health     Financial Resource Strain: High Risk (1/18/2024)    Overall Financial Resource Strain (CARDIA)     Difficulty of Paying Living Expenses: Hard   Food Insecurity: Food Insecurity Present (1/18/2024)    Hunger Vital Sign     Worried About Running Out of Food in the Last Year: Sometimes true     Ran Out of Food in the Last Year: Sometimes true   Transportation Needs: No Transportation Needs (1/18/2024)    PRAPARE - Transportation     Lack of Transportation (Medical): No     Lack of Transportation (Non-Medical): No   Physical Activity: Sufficiently Active (1/18/2024)    Exercise Vital Sign     Days of Exercise per Week: 4 days     Minutes of Exercise per Session: 60 min   Stress: Stress Concern Present (1/18/2024)    [employer] - Occupational Stress Questionnaire     Feeling of Stress : Very much   Housing Stability: High Risk (1/18/2024)    Housing Stability Vital Sign     Unable to Pay for Housing in the Last Year: Yes     Number of Places Lived in the Last Year: 2     Unstable Housing in the Last Year: No       Review of Systems   Constitutional:  Negative for chills, fatigue, fever and unexpected weight change.   HENT:  Negative for nasal congestion, ear pain, hearing loss, rhinorrhea, sore throat and voice change.    Eyes:  Negative for photophobia and visual disturbance.   Respiratory:  Negative for chest tightness,  shortness of breath and wheezing.    Cardiovascular:  Negative for chest pain, palpitations and leg swelling.   Gastrointestinal:  Positive for constipation and rectal pain. Negative for abdominal pain, blood in stool, diarrhea, nausea and vomiting.   Endocrine: Negative for polydipsia, polyphagia and polyuria.   Genitourinary:  Negative for difficulty urinating, dysuria, frequency and hematuria.   Musculoskeletal:  Negative for arthralgias, back pain and myalgias.   Integumentary:  Negative for rash and wound.   Neurological:  Negative for dizziness, syncope, weakness, light-headedness and headaches.   Psychiatric/Behavioral:  Negative for dysphoric mood, self-injury, sleep disturbance and suicidal ideas.       OBJECTIVE:      Vitals:    09/24/24 1354   BP: 115/73   BP Location: Right arm   Patient Position: Sitting   BP Method: Medium (Automatic)   Pulse: 98   Temp: 98.8 °F (37.1 °C)   TempSrc: Oral   SpO2: 99%   Weight: 107.7 kg (237 lb 6.4 oz)     Physical Exam  Vitals and nursing note reviewed.   Constitutional:       General: She is not in acute distress.     Appearance: She is well-developed. She is not ill-appearing.   HENT:      Head: Normocephalic and atraumatic.      Right Ear: External ear normal.      Left Ear: External ear normal.      Nose: Nose normal.      Mouth/Throat:      Pharynx: Uvula midline.   Eyes:      General: Lids are normal.      Conjunctiva/sclera: Conjunctivae normal.      Pupils: Pupils are equal, round, and reactive to light.      Right eye: Pupil is round and reactive.      Left eye: Pupil is round and reactive.   Neck:      Thyroid: No thyromegaly.      Vascular: No JVD.      Trachea: Trachea normal.   Cardiovascular:      Rate and Rhythm: Normal rate and regular rhythm.      Pulses: Normal pulses.      Heart sounds: Normal heart sounds.   Pulmonary:      Effort: Pulmonary effort is normal. No tachypnea or respiratory distress.      Breath sounds: Normal breath sounds.   Abdominal:       Palpations: Abdomen is soft.   Musculoskeletal:         General: Normal range of motion.      Cervical back: Normal range of motion and neck supple.   Lymphadenopathy:      Cervical: No cervical adenopathy.   Skin:     General: Skin is warm and dry.      Findings: No rash.   Neurological:      Mental Status: She is alert and oriented to person, place, and time.   Psychiatric:         Mood and Affect: Mood normal.         Speech: Speech normal.         Behavior: Behavior normal. Behavior is cooperative.         Thought Content: Thought content normal.        Assessment:       1. Hemorrhoids during pregnancy in second trimester        Plan:       Hemorrhoids during pregnancy in second trimester  Recommendation include taking a warm tub or sitz bath several times a day in plain, warm water for about 10 minutes; using ice packs to reduce swelling; ensuring regular, soft bowel movements by eliminating foods that lead to constipation, adding fiber to your diet, using stool softeners, drinking lots of fluids and exercising regularly; Spending less time on the toilet; trying to have a bowel movement when you get the urge rather than holding it.  You may also use hydrocortisone rectal cream and/or tucks pads to help with inflammation and swelling.   Discuss with patient after pregnancy it would be recommended for her to follow up with GI for hemorrhoid treatment/possible removal.  -     docusate sodium (COLACE) 100 MG capsule; Take 1 capsule (100 mg total) by mouth 2 (two) times daily.  Dispense: 180 capsule; Refill: 1  -     hydrocortisone-pramoxine (ANALPRAM-HC) 2.5-1 % Crea; Place rectally 3 (three) times daily.  Dispense: 30 g; Refill: 0        Follow up if symptoms worsen or fail to improve.      9/24/2024 ABEL Da Silva, FNP    This note was created using [employer] voice recognition software that occasionally misinterprets phrases or words.

## 2024-09-25 ENCOUNTER — TELEPHONE (OUTPATIENT)
Dept: FAMILY MEDICINE | Facility: CLINIC | Age: 24
End: 2024-09-25
Payer: COMMERCIAL

## 2024-09-25 NOTE — TELEPHONE ENCOUNTER
Examples of preferred generic products include but are not limited to: generic alclometasone 0.05% oint/crm, betamethasone dipropionate 0.05% lotion/crm/oint, betamethasone dipropionate, augmented 0.05% crm/oint/lot, betamethasone valerate 0.1% cream/lot/oint, clobetasol propionate 0.05% crm/emollient crm/gel/oint/soln, Desonide 0.05% crm/oint, fluocinolone acetonide 0.01% crm/soln, fluocinolone acetonide 0.025% crm/ointment, fluocinolone acetonide 0.01% topical oil, fluocinonide 0.05% crm/emollient crm/gel/oint/soln, fluticasone propionate 0.05% crm, fluticasone propionate 0.005% oint, halobetasol propionate 0.05% crm/oint, hydrocortisone 2% lotion, hydrocortisone 1% crm/lot/oint/soln, hydrocortisone 2.5% cream/lotion/oint, hydrocortisone butyrate 0.1% crm/emollient crm, hydrocortisone valerate 0.2% crm/oint, mometasone furoate 0.1% crm/soln/oint, prednicarbate 0.1% crm/oint, triamcinolone acetonide 0.025% crm/lot/oint, triamcinolone acetonide 0.1% crm/lot/oint, triamcinolone acetonide 0.5% crm/oint.

## 2024-09-26 ENCOUNTER — TELEPHONE (OUTPATIENT)
Dept: FAMILY MEDICINE | Facility: CLINIC | Age: 24
End: 2024-09-26
Payer: COMMERCIAL

## 2024-09-27 ENCOUNTER — ROUTINE PRENATAL (OUTPATIENT)
Dept: OBSTETRICS AND GYNECOLOGY | Facility: CLINIC | Age: 24
End: 2024-09-27
Payer: COMMERCIAL

## 2024-09-27 VITALS — SYSTOLIC BLOOD PRESSURE: 128 MMHG | DIASTOLIC BLOOD PRESSURE: 68 MMHG | BODY MASS INDEX: 38.82 KG/M2 | WEIGHT: 240.5 LBS

## 2024-09-27 DIAGNOSIS — Z34.90 ENCOUNTER FOR SUPERVISION OF NORMAL PREGNANCY, ANTEPARTUM, UNSPECIFIED GRAVIDITY: Primary | ICD-10-CM

## 2024-09-27 DIAGNOSIS — Z3A.17 17 WEEKS GESTATION OF PREGNANCY: ICD-10-CM

## 2024-09-27 LAB
BILIRUBIN, UA POC OHS: ABNORMAL
BLOOD, UA POC OHS: NEGATIVE
CLARITY, UA POC OHS: CLEAR
COLOR, UA POC OHS: YELLOW
GLUCOSE, UA POC OHS: NEGATIVE
KETONES, UA POC OHS: ABNORMAL
LEUKOCYTES, UA POC OHS: ABNORMAL
NITRITE, UA POC OHS: NEGATIVE
PH, UA POC OHS: 5.5
PROTEIN, UA POC OHS: NEGATIVE
SPECIFIC GRAVITY, UA POC OHS: >=1.03
UROBILINOGEN, UA POC OHS: 1

## 2024-09-27 PROCEDURE — 87086 URINE CULTURE/COLONY COUNT: CPT | Performed by: SPECIALIST

## 2024-09-27 PROCEDURE — 99999 PR PBB SHADOW E&M-EST. PATIENT-LVL III: CPT | Mod: PBBFAC,,, | Performed by: SPECIALIST

## 2024-09-27 NOTE — PROGRESS NOTES
Complaints today:None , Positive quickening No Bleeding or pains    Pt with significant anemia dn Venifer treatment plan ordered In addition, history PRE and pt on Lovenox 40mg daily Pt admits to sporadic use and I discussed up to 4x increased risk of recurrent thrombosis and importance of daily dosing    /68   Wt 109.1 kg (240 lb 8.4 oz)   LMP 2024   BMI 38.82 kg/m²     23 y.o., at 17w6d by Estimated Date of Delivery: 3/1/25  Patient Active Problem List   Diagnosis    Iron deficiency anemia    Iron deficiency anemia due to chronic blood loss    Carpal tunnel syndrome, right    Anxiety and depression    Engorgement of breasts, postpartum condition or complication    Bilateral pulmonary embolism    Bilateral pulmonary embolism    Acute septic pulmonary embolism with acute cor pulmonale    Menorrhagia, premenopausal    Gallstones    Menorrhagia with irregular cycle     OB History    Para Term  AB Living   3 1 1 0 1 1   SAB IAB Ectopic Multiple Live Births   1 0 0 0 1      # Outcome Date GA Lbr Jacobo/2nd Weight Sex Type Anes PTL Lv   3 Current            2 Term 21 39w0d  3.756 kg (8 lb 4.5 oz) M CS-LTranv Spinal  ANDRZEJ   1 SAB                Dating reviewed    Allergies and problem list reviewed and updated    Medical and surgical history reviewed    Prenatal labs reviewed and updated    Physical Exam:  ABD: soft, gravid, nontender,     Assessment:  IUP 17w6d  Prior PE  Significant anemia  Non compliance    Plan:   MFM anatomy u/s scheduled  I DISCUSSED IMPORTNACE OF MEDICAL COMPLIANCE AND URGED PT TO CONTINUE LOVENOX AND VENIFER THERAPY AS DIRECTED  follow up 4Weeks, bleeding/pain precautions

## 2024-09-28 LAB
BACTERIA UR CULT: NORMAL
BACTERIA UR CULT: NORMAL

## 2024-10-08 ENCOUNTER — OFFICE VISIT (OUTPATIENT)
Dept: MATERNAL FETAL MEDICINE | Facility: CLINIC | Age: 24
End: 2024-10-08
Payer: COMMERCIAL

## 2024-10-08 ENCOUNTER — PROCEDURE VISIT (OUTPATIENT)
Dept: MATERNAL FETAL MEDICINE | Facility: CLINIC | Age: 24
End: 2024-10-08
Payer: COMMERCIAL

## 2024-10-08 VITALS
HEIGHT: 66 IN | HEART RATE: 83 BPM | SYSTOLIC BLOOD PRESSURE: 97 MMHG | WEIGHT: 242.5 LBS | BODY MASS INDEX: 38.97 KG/M2 | DIASTOLIC BLOOD PRESSURE: 51 MMHG

## 2024-10-08 DIAGNOSIS — Z86.711 HISTORY OF PULMONARY EMBOLUS (PE): ICD-10-CM

## 2024-10-08 DIAGNOSIS — I26.99 BILATERAL PULMONARY EMBOLISM: Primary | ICD-10-CM

## 2024-10-08 DIAGNOSIS — Z3A.13 13 WEEKS GESTATION OF PREGNANCY: ICD-10-CM

## 2024-10-08 DIAGNOSIS — Z36.89 ENCOUNTER FOR FETAL ANATOMIC SURVEY: ICD-10-CM

## 2024-10-08 DIAGNOSIS — O99.012 ANEMIA IN PREGNANCY, SECOND TRIMESTER: ICD-10-CM

## 2024-10-08 DIAGNOSIS — D50.8 OTHER IRON DEFICIENCY ANEMIA: ICD-10-CM

## 2024-10-08 PROCEDURE — 99999 PR PBB SHADOW E&M-EST. PATIENT-LVL III: CPT | Mod: PBBFAC,,, | Performed by: STUDENT IN AN ORGANIZED HEALTH CARE EDUCATION/TRAINING PROGRAM

## 2024-10-08 PROCEDURE — 76811 OB US DETAILED SNGL FETUS: CPT | Mod: S$GLB,,, | Performed by: STUDENT IN AN ORGANIZED HEALTH CARE EDUCATION/TRAINING PROGRAM

## 2024-10-08 NOTE — PROGRESS NOTES
"MATERNAL-FETAL MEDICINE   CONSULT NOTE    Provider requesting consultation: Dr. Michael MD  SUBJECTIVE:   Ms. Ricardo Rivera is a 23 y.o.  female with IUP at 19w3d who is seen in consultation by MFM for evaluation and management of:  Problem   Bilateral Pulmonary Embolism   Iron Deficiency Anemia            Review of patient's allergies indicates:  No Known Allergies  OB History    Para Term  AB Living   3 1 1 0 1 1   SAB IAB Ectopic Multiple Live Births   1 0 0 0 1      # Outcome Date GA Lbr Jacobo/2nd Weight Sex Type Anes PTL Lv   3 Current            2 Term 21 39w0d  3.756 kg (8 lb 4.5 oz) M CS-LTranv Spinal  ANDRZEJ   1 SAB              Past Medical History:   Diagnosis Date    Iron deficiency anemia 2018     Past Surgical History:   Procedure Laterality Date     SECTION N/A 2021    Procedure:  SECTION;  Surgeon: Rafael Rodriguez MD;  Location: [facility] L&D;  Service: OB/GYN;  Laterality: N/A;    LAPAROSCOPIC CHOLECYSTECTOMY N/A 2023    Procedure: CHOLECYSTECTOMY, LAPAROSCOPIC;  Surgeon: Trav Thomson MD;  Location: [facility];  Service: General;  Laterality: N/A;     Family history: negative for birth defects, recurrent miscarriages, chromosomal abnormalities.   Social History     Tobacco Use    Smoking status: Former     Types: Vaping with nicotine    Smokeless tobacco: Never   Substance Use Topics    Alcohol use: No    Drug use: No     Review of patient's allergies indicates:  No Known Allergies  Objective:   BP (!) 97/51 (BP Location: Left arm, Patient Position: Sitting)   Pulse 83   Ht 5' 6" (1.676 m)   Wt 110 kg (242 lb 8.1 oz)   LMP 2024   BMI 39.14 kg/m²   Ultrasound performed. See viewpoint for full ultrasound report.  A detailed fetal anatomic ultrasound examination was performed for the following high risk indication: high maternal BMI.   No fetal structural malformations are identified; however, fetal imaging is incomplete today.   A " follow-up study will be scheduled to complete the fetal anatomic survey.   Fetal size today is consistent with established gestational age.   Cervical length by TA scanning is normal.   Placental location is posterior without evidence of previa.   Significant labs/imaging:  NIPT low risk  ASSESSMENT/PLAN:     23 y.o.  female with IUP at 19w3d     Bilateral pulmonary embolism  Ricardo has a long history of iron deficiency anemia originally attributed to heavy menses. She was followed by Pediatric Oncology at [facility] originally. She was placed on OCPs to help with the AUB however this provoked bilateral pulmonary emboli.  She now follows with Dr. Whitehead with Hematology. She continues to have severe microcystic anemia with most recent Hgb of 6, MCV of 60 and RDW of 20. Platelets are normal  Her inherited thrombophilia panel was completed outside of pregnancy and was normal in . She has an unremarkable family history. Paternal grandmother had a VTE during cancer treatments in her arm.  She is unaware of a Hgb electrophoresis to look for thalassemias.     I reviewed Dr. Rodriguez's notes and stressed the importance of compliance with Lovenox and weekly Venofer injections. We reviewed the risks of severe anemia. Symptoms indicative of anemia include fatigue, weakness, headache, palpitations, lightheadedness, and shortness of breath. While these vague symptoms may only be a minor inconvenience to those experiencing them, the underlying disease can have serious consequences on both maternal (need for blood transfusion) and fetal morbidity ie  birth, low birth weight and hydrops( in severe cases) .      Summary of Recommendations given her history:   Prophylactic anticoagulation during the antepartum period/ prophylactic anticoagulation for 6 weeks postpartum - Continue Lovenox 40 mg daily.  May need to increase if BMI >40  Repeat maternal echo due to history of PE.  We have messaged Dr. Whitehead's staff to  have her seen as soon as possible- will inquire about thalassemia and vWF work up as I do not have these records    Choice and dosage of anticoagulation  We recommend use of LMWH/enoxaparin over unfractionated heparin. If the patient is unable to fill this medication or if there is a high likelihood for need to reverse this medication, unfractionated heparin should be prescribed.  Prophylaxis:  For patients with a BMI =< 40, prescribe lovenox 40 mg daily  For patients with a BMI > 40, prescribe lovenox 40 mg BID  UFH dose is 3923-0654 units BID, 7500-00518 units BID, and 48772 units BID in the first, second, and third trimesters, respectively       Monitoring  Anti-Xa levels should be monitored in patients on therapeutic lovenox or UFH every month.   This should be drawn 4-6 hours after the morning injection and after at least 3 doses.   (Goal anti-Xa for lovenox is 0.6-1.0 u/ml and for UFH is 0.3-0.7 u/ml; however, this can be lab dependent).  A serum creatinine should be obtained prior to initiating therapeutic lovenox. If the serum creatinine is > 1.2, the dose may need to be decreased, in conjunction with [facility] and clinical pharmacy.   A baseline CBC should be obtained prior to initiating therapeutic anticoagulation. Any evidence of thrombocytopenia should be reviewed by [facility] (platelets < 100K).    Thrombophilia evaluation  We recommend the following evaluation for inherited thrombophilia: Factor V Leiden mutation analysis, Prothrombin [id number] mutation analysis, protein C activity (< 65% is suggestive), and evaluation of antithrombin III deficiency. Evaluation for protein S deficiency is not valid during pregnancy. Evaluation for protein C, protein S, and antithrombin deficiency is not valid during an acute thrombotic event or while on anticoagulation.      Peripartum Management  HILARY hose/SCDs should be used while anticoagulation is interrupted.  Regardless of whether the patient is on prophylactic dose UFH or  LMWH, the last dose should be 12 hours prior to neuraxial anesthesia. For this reason, we no longer recommend conversion to UFH at 36 weeks.   In patients with neuraxial anesthesia: prophylactic anticoagulation should not be initiated within 12 hours of neuraxial blockade or within 4 hours of epidural removal, whichever is later; therapeutic anticoagulation with LMWH should not be initiated within 24 hours of neuraxial blockade or within 4 hours of epidural removal, whichever is later.     Prophylactic anticoagulation (defer to timing related to neuraxial anesthesia)  After vaginal delivery: should be started no sooner than 6 hours   After  delivery: should be started no sooner than 12 hours  Therapeutic anticoagulation (defer to timing related to neuraxial anesthesia)   After vaginal delivery: should be started no sooner than 12 hours   After  delivery: should be started no sooner than 18-24 hours  For patients at exceedingly high risk of VTE, this may need to be modified in consultation with MFM.    Breastfeeding is compatible with warfarin, UFH, and LMWH.      Iron deficiency anemia  As above      FOLLOW UP:   F/u in 4 weeks for US/MFM visit      Radha Nguyen  Maternal-Fetal Medicine    Electronically Signed by Radha Nguyen 2024

## 2024-10-08 NOTE — PROGRESS NOTES
Order for Maternal Echo placed. Number to diagnostic scheduling given to patient to schedule ([phone]).   EPIC message sent to Dr. RANJITH Whitehead's staff requesting appt ASAP for severe anemia. Pt is already an established pt of his.   Pt scheduled for next [facility] appt to finish anatomy.

## 2024-10-08 NOTE — ASSESSMENT & PLAN NOTE
Ricardo has a long history of iron deficiency anemia originally attributed to heavy menses. She was followed by Pediatric Oncology at [facility] originally. She was placed on OCPs to help with the AUB however this provoked bilateral pulmonary emboli.  She now follows with Dr. Whitehead with Hematology. She continues to have severe microcystic anemia with most recent Hgb of 6, MCV of 60 and RDW of 20. Platelets are normal  Her inherited thrombophilia panel was completed outside of pregnancy and was normal in . She has an unremarkable family history. Paternal grandmother had a VTE during cancer treatments in her arm.  She is unaware of a Hgb electrophoresis to look for thalassemias.     I reviewed Dr. Rodriguez's notes and stressed the importance of compliance with Lovenox and weekly Venofer injections. We reviewed the risks of severe anemia. Symptoms indicative of anemia include fatigue, weakness, headache, palpitations, lightheadedness, and shortness of breath. While these vague symptoms may only be a minor inconvenience to those experiencing them, the underlying disease can have serious consequences on both maternal (need for blood transfusion) and fetal morbidity ie  birth, low birth weight and hydrops( in severe cases) .      Summary of Recommendations given her history:   Prophylactic anticoagulation during the antepartum period/ prophylactic anticoagulation for 6 weeks postpartum - Continue Lovenox 40 mg daily.  May need to increase if BMI >40  Repeat maternal echo due to history of PE.  We have messaged Dr. Whitehead's staff to have her seen as soon as possible- will inquire about thalassemia and vWF work up as I do not have these records    Choice and dosage of anticoagulation  We recommend use of LMWH/enoxaparin over unfractionated heparin. If the patient is unable to fill this medication or if there is a high likelihood for need to reverse this medication, unfractionated heparin should be  prescribed.  Prophylaxis:  For patients with a BMI =< 40, prescribe lovenox 40 mg daily  For patients with a BMI > 40, prescribe lovenox 40 mg BID  UFH dose is 1772-8908 units BID, 7500-52032 units BID, and 05498 units BID in the first, second, and third trimesters, respectively       Monitoring  Anti-Xa levels should be monitored in patients on therapeutic lovenox or UFH every month.   This should be drawn 4-6 hours after the morning injection and after at least 3 doses.   (Goal anti-Xa for lovenox is 0.6-1.0 u/ml and for UFH is 0.3-0.7 u/ml; however, this can be lab dependent).  A serum creatinine should be obtained prior to initiating therapeutic lovenox. If the serum creatinine is > 1.2, the dose may need to be decreased, in conjunction with MFM and clinical pharmacy.   A baseline CBC should be obtained prior to initiating therapeutic anticoagulation. Any evidence of thrombocytopenia should be reviewed by MFM (platelets < 100K).    Thrombophilia evaluation  We recommend the following evaluation for inherited thrombophilia: Factor V Leiden mutation analysis, Prothrombin [id number] mutation analysis, protein C activity (< 65% is suggestive), and evaluation of antithrombin III deficiency. Evaluation for protein S deficiency is not valid during pregnancy. Evaluation for protein C, protein S, and antithrombin deficiency is not valid during an acute thrombotic event or while on anticoagulation.      Peripartum Management  HILARY hose/SCDs should be used while anticoagulation is interrupted.  Regardless of whether the patient is on prophylactic dose UFH or LMWH, the last dose should be 12 hours prior to neuraxial anesthesia. For this reason, we no longer recommend conversion to UFH at 36 weeks.   In patients with neuraxial anesthesia: prophylactic anticoagulation should not be initiated within 12 hours of neuraxial blockade or within 4 hours of epidural removal, whichever is later; therapeutic anticoagulation with LMWH  should not be initiated within 24 hours of neuraxial blockade or within 4 hours of epidural removal, whichever is later.     Prophylactic anticoagulation (defer to timing related to neuraxial anesthesia)  After vaginal delivery: should be started no sooner than 6 hours   After  delivery: should be started no sooner than 12 hours  Therapeutic anticoagulation (defer to timing related to neuraxial anesthesia)   After vaginal delivery: should be started no sooner than 12 hours   After  delivery: should be started no sooner than 18-24 hours  For patients at exceedingly high risk of VTE, this may need to be modified in consultation with MFM.    Breastfeeding is compatible with warfarin, UFH, and LMWH.

## 2024-10-10 ENCOUNTER — PATIENT MESSAGE (OUTPATIENT)
Dept: MATERNAL FETAL MEDICINE | Facility: CLINIC | Age: 24
End: 2024-10-10
Payer: COMMERCIAL

## 2024-10-12 ENCOUNTER — PATIENT MESSAGE (OUTPATIENT)
Dept: OTHER | Facility: OTHER | Age: 24
End: 2024-10-12
Payer: COMMERCIAL

## 2024-10-17 ENCOUNTER — TELEPHONE (OUTPATIENT)
Facility: CLINIC | Age: 24
End: 2024-10-17
Payer: COMMERCIAL

## 2024-10-17 ENCOUNTER — LAB VISIT (OUTPATIENT)
Dept: LAB | Facility: HOSPITAL | Age: 24
End: 2024-10-17
Attending: STUDENT IN AN ORGANIZED HEALTH CARE EDUCATION/TRAINING PROGRAM
Payer: COMMERCIAL

## 2024-10-17 DIAGNOSIS — O99.012 ANEMIA IN PREGNANCY, SECOND TRIMESTER: ICD-10-CM

## 2024-10-17 LAB
BASOPHILS # BLD AUTO: 0.05 K/UL (ref 0–0.2)
BASOPHILS NFR BLD: 0.4 % (ref 0–1.9)
DIFFERENTIAL METHOD BLD: ABNORMAL
EOSINOPHIL # BLD AUTO: 0.1 K/UL (ref 0–0.5)
EOSINOPHIL NFR BLD: 1 % (ref 0–8)
ERYTHROCYTE [DISTWIDTH] IN BLOOD BY AUTOMATED COUNT: 20.6 % (ref 11.5–14.5)
HCT VFR BLD AUTO: 28.8 % (ref 37–48.5)
HGB BLD-MCNC: 7.6 G/DL (ref 12–16)
IMM GRANULOCYTES # BLD AUTO: 0.05 K/UL (ref 0–0.04)
IMM GRANULOCYTES NFR BLD AUTO: 0.4 % (ref 0–0.5)
IRON SERPL-MCNC: 20 UG/DL (ref 30–160)
LYMPHOCYTES # BLD AUTO: 2.2 K/UL (ref 1–4.8)
LYMPHOCYTES NFR BLD: 17 % (ref 18–48)
MCH RBC QN AUTO: 18 PG (ref 27–31)
MCHC RBC AUTO-ENTMCNC: 26.4 G/DL (ref 32–36)
MCV RBC AUTO: 68 FL (ref 82–98)
MONOCYTES # BLD AUTO: 0.5 K/UL (ref 0.3–1)
MONOCYTES NFR BLD: 3.7 % (ref 4–15)
NEUTROPHILS # BLD AUTO: 10.1 K/UL (ref 1.8–7.7)
NEUTROPHILS NFR BLD: 77.5 % (ref 38–73)
NRBC BLD-RTO: 0 /100 WBC
PLATELET # BLD AUTO: 329 K/UL (ref 150–450)
PMV BLD AUTO: 10.1 FL (ref 9.2–12.9)
RBC # BLD AUTO: 4.22 M/UL (ref 4–5.4)
SATURATED IRON: 4 % (ref 20–50)
TOTAL IRON BINDING CAPACITY: 543 UG/DL (ref 250–450)
TRANSFERRIN SERPL-MCNC: 367 MG/DL (ref 200–375)
WBC # BLD AUTO: 13 K/UL (ref 3.9–12.7)

## 2024-10-17 PROCEDURE — 85240 CLOT FACTOR VIII AHG 1 STAGE: CPT | Performed by: STUDENT IN AN ORGANIZED HEALTH CARE EDUCATION/TRAINING PROGRAM

## 2024-10-17 PROCEDURE — 83540 ASSAY OF IRON: CPT | Performed by: STUDENT IN AN ORGANIZED HEALTH CARE EDUCATION/TRAINING PROGRAM

## 2024-10-17 PROCEDURE — 82728 ASSAY OF FERRITIN: CPT | Performed by: STUDENT IN AN ORGANIZED HEALTH CARE EDUCATION/TRAINING PROGRAM

## 2024-10-17 PROCEDURE — 85246 CLOT FACTOR VIII VW ANTIGEN: CPT | Performed by: STUDENT IN AN ORGANIZED HEALTH CARE EDUCATION/TRAINING PROGRAM

## 2024-10-17 PROCEDURE — 83020 HEMOGLOBIN ELECTROPHORESIS: CPT | Performed by: STUDENT IN AN ORGANIZED HEALTH CARE EDUCATION/TRAINING PROGRAM

## 2024-10-17 PROCEDURE — 85397 CLOTTING FUNCT ACTIVITY: CPT | Performed by: STUDENT IN AN ORGANIZED HEALTH CARE EDUCATION/TRAINING PROGRAM

## 2024-10-17 PROCEDURE — 83021 HEMOGLOBIN CHROMOTOGRAPHY: CPT | Mod: 91 | Performed by: STUDENT IN AN ORGANIZED HEALTH CARE EDUCATION/TRAINING PROGRAM

## 2024-10-17 PROCEDURE — 85390 FIBRINOLYSINS SCREEN I&R: CPT | Performed by: STUDENT IN AN ORGANIZED HEALTH CARE EDUCATION/TRAINING PROGRAM

## 2024-10-17 PROCEDURE — 85025 COMPLETE CBC W/AUTO DIFF WBC: CPT | Performed by: STUDENT IN AN ORGANIZED HEALTH CARE EDUCATION/TRAINING PROGRAM

## 2024-10-21 LAB
FACT VIII ACT/NOR PPP: 305 % (ref 55–200)
VON WILLEBRAND EVAL PPP-IMP: ABNORMAL
VWF AG ACT/NOR PPP IA: 255 % (ref 55–200)
VWF:AC ACT/NOR PPP IA: 207 % (ref 55–200)

## 2024-10-22 ENCOUNTER — NURSE TRIAGE (OUTPATIENT)
Dept: ADMINISTRATIVE | Facility: CLINIC | Age: 24
End: 2024-10-22
Payer: COMMERCIAL

## 2024-10-22 LAB
FERRITIN SERPL-MCNC: 5 MCG/L (ref 6–175)
HGB A MFR BLD ELPH: 98.1 % (ref 95.8–98)
HGB A2 MFR BLD: 1.9 % (ref 2–3.3)
HGB A2+XXX MFR BLD ELPH: ABNORMAL %
HGB F MFR BLD: 0 % (ref 0–0.9)
HGB XXX MFR BLD ELPH: ABNORMAL %
HPLC HB VARIANT: ABNORMAL
PATH REV BLD -IMP: ABNORMAL
PROVIDER SIGNING NAME: ABNORMAL

## 2024-10-22 NOTE — TELEPHONE ENCOUNTER
Pt is 21 weeks 3 days pregnant. She had really bad pain all night in her stomach left and middle part of her abdomen. Abdominal pain was 7-8/10 last night. Pt denies pain at present time. There is a lump on the inside of her belly button. The lump feels like its the size of a marble. Lump is painful to touch. Care advice recommends pt go to Er. Pt verbalized understanding.   Reason for Disposition   Hernia suspected (bulge in groin or abdomen) and painful or vomiting    Additional Information   Negative: Sounds like a life-threatening emergency to the triager    Protocols used: Skin Lump or Localized Swelling-A-OH

## 2024-10-24 ENCOUNTER — OFFICE VISIT (OUTPATIENT)
Facility: CLINIC | Age: 24
End: 2024-10-24
Payer: COMMERCIAL

## 2024-10-24 VITALS
SYSTOLIC BLOOD PRESSURE: 117 MMHG | RESPIRATION RATE: 16 BRPM | HEART RATE: 81 BPM | BODY MASS INDEX: 39.41 KG/M2 | DIASTOLIC BLOOD PRESSURE: 56 MMHG | WEIGHT: 244.19 LBS | TEMPERATURE: 98 F

## 2024-10-24 DIAGNOSIS — D50.0 IRON DEFICIENCY ANEMIA DUE TO CHRONIC BLOOD LOSS: Primary | ICD-10-CM

## 2024-10-24 DIAGNOSIS — I26.01 ACUTE SEPTIC PULMONARY EMBOLISM WITH ACUTE COR PULMONALE: ICD-10-CM

## 2024-10-24 PROCEDURE — 99999 PR PBB SHADOW E&M-EST. PATIENT-LVL III: CPT | Mod: PBBFAC,,, | Performed by: INTERNAL MEDICINE

## 2024-10-24 PROCEDURE — 1159F MED LIST DOCD IN RCRD: CPT | Mod: CPTII,S$GLB,, | Performed by: INTERNAL MEDICINE

## 2024-10-24 PROCEDURE — 3008F BODY MASS INDEX DOCD: CPT | Mod: CPTII,S$GLB,, | Performed by: INTERNAL MEDICINE

## 2024-10-24 PROCEDURE — G2211 COMPLEX E/M VISIT ADD ON: HCPCS | Mod: S$GLB,,, | Performed by: INTERNAL MEDICINE

## 2024-10-24 PROCEDURE — 3078F DIAST BP <80 MM HG: CPT | Mod: CPTII,S$GLB,, | Performed by: INTERNAL MEDICINE

## 2024-10-24 PROCEDURE — 3074F SYST BP LT 130 MM HG: CPT | Mod: CPTII,S$GLB,, | Performed by: INTERNAL MEDICINE

## 2024-10-24 PROCEDURE — 99214 OFFICE O/P EST MOD 30 MIN: CPT | Mod: S$GLB,,, | Performed by: INTERNAL MEDICINE

## 2024-10-24 NOTE — ASSESSMENT & PLAN NOTE
Patient is now 21 weeks pregnant and is very anemic and getting worse.  Will arrange for IV iron now that she is in the 2nd trimester and will begin this next week.  She has had this in the past and tolerated this well.  Will see her again in about 4 weeks with labs.

## 2024-10-24 NOTE — PROGRESS NOTES
PROGRESS NOTE    Subjective:       Patient ID: Ricardo Rivera is a 24 y.o. female.    10/25/2021:  CTA chest:  Extensive bilateral PE   Thought due to OCPs  Eliquis given for 4 months.     PLAN as of 1/19/2022: IV iron weekly x 8, weekly CBC and will need choly when above 10    2/2/2023:  Choly done-path c/w chronic cholecystitis     4/13/2023:  Completed IV iron x 8.     Chief Complaint:  No chief complaint on file.  Pregnancy---Iron def anemia and history of PE    History of Present Illness:   Ricardo Rivera is a 24 y.o. female who presents for follow of of above.      Ricardo is now 21 WGA pregnant.  Feeling fatigued.      PMH:  Patient diagnosed with extensive PE October 2021.  This was the only clotting problem she has had and this was attributed to use of birth control at that time. [place] Eliquis for 4 months.      PMH-12/23/2022  Ms. Rivera is a 23yo female who was diagnosed with iron deficiency anemia at 15 years of age.  She was seeing pediatric oncology at [facility] since 15 years old.  This was attributed to heavy menses and she was placed on birth control.  She unfortunately diagnosed with pulmonary embolism in 2021 and this was discontinued.   She has historically been treated with iron supplementation with oral iron but this makes her very constipated so she has discontinued this.  She has been treated with IV iron on 3 different occasions and has not had any allergic issues with this.         Family and Social history reviewed and is unchanged from 12/23/2022             Current Outpatient Medications:     docusate sodium (COLACE) 100 MG capsule, Take 1 capsule (100 mg total) by mouth 2 (two) times daily., Disp: 180 capsule, Rfl: 1    enoxaparin (LOVENOX) 40 mg/0.4 mL Syrg, Inject 0.4 mLs (40 mg total) into the skin once daily., Disp: 30 each, Rfl: 6    hydrocortisone-pramoxine (ANALPRAM-HC) 2.5-1 % Crea, Place rectally 3  (three) times daily. (Patient not taking: Reported on 9/27/2024), Disp: 30 g, Rfl: 0    pramoxine-hydrocortisone (PROCTOCREAM-HC) 1-1 % rectal cream, Place rectally 3 (three) times daily. (Patient not taking: Reported on 9/27/2024), Disp: 30 g, Rfl: 0        Objective:       Physical Examination:     BP (!) 117/56   Pulse 81   Temp 98 °F (36.7 °C)   Resp 16   Wt 110.8 kg (244 lb 3.2 oz)   LMP 05/25/2024   BMI 39.41 kg/m²     Physical Exam  Constitutional:       Appearance: She is well-developed.   HENT:      Head: Normocephalic and atraumatic.      Right Ear: External ear normal.      Left Ear: External ear normal.   Eyes:      Conjunctiva/sclera: Conjunctivae normal.      Pupils: Pupils are equal, round, and reactive to light.   Neck:      Thyroid: No thyromegaly.      Trachea: No tracheal deviation.   Cardiovascular:      Rate and Rhythm: Normal rate and regular rhythm.      Heart sounds: Normal heart sounds.   Pulmonary:      Effort: Pulmonary effort is normal.      Breath sounds: Normal breath sounds.   Abdominal:      General: Bowel sounds are normal. There is no distension.      Palpations: Abdomen is soft. There is no mass.      Tenderness: There is no abdominal tenderness.   Skin:     Findings: No rash.   Neurological:      Comments: Neuro intact througout   Psychiatric:         Behavior: Behavior normal.         Thought Content: Thought content normal.         Judgment: Judgment normal.         Labs:   Recent Results (from the past 2 weeks)   CBC W/ AUTO DIFFERENTIAL    Collection Time: 10/17/24  1:59 PM   Result Value Ref Range    WBC 13.00 (H) 3.90 - 12.70 K/uL    Hemoglobin 7.6 (L) 12.0 - 16.0 g/dL    Hematocrit 28.8 (L) 37.0 - 48.5 %    Platelets 329 150 - 450 K/uL       CMP  Sodium   Date Value Ref Range Status   03/05/2024 139 136 - 145 mmol/L Final     Potassium   Date Value Ref Range Status   03/05/2024 4.2 3.5 - 5.1 mmol/L Final     Chloride   Date Value Ref Range Status   03/05/2024 110 95 -  "110 mmol/L Final     CO2   Date Value Ref Range Status   03/05/2024 18 (L) 23 - 29 mmol/L Final     Glucose   Date Value Ref Range Status   03/05/2024 86 70 - 110 mg/dL Final     BUN   Date Value Ref Range Status   03/05/2024 8 6 - 20 mg/dL Final     Creatinine   Date Value Ref Range Status   03/05/2024 0.8 0.5 - 1.4 mg/dL Final     Calcium   Date Value Ref Range Status   03/05/2024 9.4 8.7 - 10.5 mg/dL Final     Total Protein   Date Value Ref Range Status   03/05/2024 7.5 6.0 - 8.4 g/dL Final     Albumin   Date Value Ref Range Status   03/05/2024 4.1 3.5 - 5.2 g/dL Final     Total Bilirubin   Date Value Ref Range Status   03/05/2024 0.9 0.1 - 1.0 mg/dL Final     Comment:     For infants and newborns, interpretation of results should be based  on gestational age, weight and in agreement with clinical  observations.    Premature Infant recommended reference ranges:  Up to 24 hours.............<8.0 mg/dL  Up to 48 hours............<12.0 mg/dL  3-5 days..................<15.0 mg/dL  6-29 days.................<15.0 mg/dL       Alkaline Phosphatase   Date Value Ref Range Status   03/05/2024 61 55 - 135 U/L Final     AST   Date Value Ref Range Status   03/05/2024 15 10 - 40 U/L Final     ALT   Date Value Ref Range Status   03/05/2024 11 10 - 44 U/L Final     Anion Gap   Date Value Ref Range Status   03/05/2024 11 8 - 16 mmol/L Final     eGFR if    Date Value Ref Range Status   10/27/2021 >60 >60 mL/min/1.73 m^2 Final     eGFR if non    Date Value Ref Range Status   10/27/2021 >60 >60 mL/min/1.73 m^2 Final     Comment:     Calculation used to obtain the estimated glomerular filtration  rate (eGFR) is the CKD-EPI equation.        No results found for: "CEA"  No results found for: "PSA"        Assessment/Plan:     Problem List Items Addressed This Visit       Iron deficiency anemia due to chronic blood loss - Primary     Patient is now 21 weeks pregnant and is very anemic and getting worse.  " Will arrange for IV iron now that she is in the 2nd trimester and will begin this next week.  She has had this in the past and tolerated this well.  Will see her again in about 4 weeks with labs.          Relevant Orders    Ferritin    CBC Auto Differential    Acute septic pulmonary embolism with acute cor pulmonale     Will continue her on Lovenox prophylaxis.  Doing well.  No bleeding or new issues o/w.                Discussion:     Follow up in about 4 weeks (around 11/21/2024).      Electronically signed by Frandy Flores

## 2024-10-25 ENCOUNTER — ROUTINE PRENATAL (OUTPATIENT)
Dept: OBSTETRICS AND GYNECOLOGY | Facility: CLINIC | Age: 24
End: 2024-10-25
Payer: COMMERCIAL

## 2024-10-25 VITALS
WEIGHT: 242.06 LBS | SYSTOLIC BLOOD PRESSURE: 112 MMHG | BODY MASS INDEX: 39.07 KG/M2 | DIASTOLIC BLOOD PRESSURE: 68 MMHG

## 2024-10-25 DIAGNOSIS — Z3A.21 21 WEEKS GESTATION OF PREGNANCY: Primary | ICD-10-CM

## 2024-10-25 LAB
BILIRUBIN, UA POC OHS: NEGATIVE
BLOOD, UA POC OHS: NEGATIVE
CLARITY, UA POC OHS: CLEAR
COLOR, UA POC OHS: YELLOW
GLUCOSE, UA POC OHS: NEGATIVE
KETONES, UA POC OHS: NEGATIVE
LEUKOCYTES, UA POC OHS: ABNORMAL
NITRITE, UA POC OHS: NEGATIVE
PH, UA POC OHS: 6.5
PROTEIN, UA POC OHS: NEGATIVE
SPECIFIC GRAVITY, UA POC OHS: >=1.03
UROBILINOGEN, UA POC OHS: 1

## 2024-10-25 PROCEDURE — 99999 PR PBB SHADOW E&M-EST. PATIENT-LVL II: CPT | Mod: PBBFAC,,,

## 2024-10-25 NOTE — PROGRESS NOTES
The patient presents with complaints of generalized discomfort, recommendations made.   Reports: Good fetal movements reported. No Bleeding or pains  Hematology yesterday, will arrange for iron infusion.   Follow up anatomy scan with MFM In two weeks    10/25/2024  24 y.o. 21w6d Estimated Date of Delivery: 3/1/25, dating reviewed.   OB History    Para Term  AB Living   3 1 1 0 1 1   SAB IAB Ectopic Multiple Live Births   1 0 0 0 1      # Outcome Date GA Lbr Jacobo/2nd Weight Sex Type Anes PTL Lv   3 Current            2 Term 21 39w0d  3.756 kg (8 lb 4.5 oz) M CS-LTranv Spinal  ANDRZEJ   1 SAB                Prenatal labs reviewed and updated today    Review of Systems:  General ROS: negative for headache or visual changes  Breast ROS: negative for breast lumps  Gastrointestinal ROS: negative for constipation, diarrhea or nausea/vomiting  Musculoskeletal ROS: negative for pain in joints or swelling in face or hands.   Neurological ROS: negative for - headaches, numbness/tingling or visual changes      Physical Exam:  /68   Wt 109.8 kg (242 lb 1 oz)   LMP 2024   BMI 39.07 kg/m²   Urine Dip: Pending  Fundal Height:deferred   Fetal Heart Tones: 150 bpm  Constitutional: She is oriented to person, place, and time. She appears well-developed and well-nourished. No distress. Overweight   Cardiovascular: Normal rate.    Pulmonary/Chest: Effort normal. No respiratory distress  Abdominal: Soft, gravid, nontender. No rebound and no guarding.     Genitourinary: Deferred     Musculoskeletal: Normal range of motion, no peripheral edema.   Neurological: She is alert and oriented to person, place, and time. Coordination normal.   Skin: Skin is warm and dry. She is not diaphoretic.  Psychiatric: She has a normal mood and affect.        Assessment:  24 y.o., at 21w6d Gestation   Patient Active Problem List   Diagnosis    Iron deficiency anemia    Iron deficiency anemia due to chronic blood loss    Carpal  tunnel syndrome, right    Anxiety and depression    Engorgement of breasts, postpartum condition or complication    Bilateral pulmonary embolism    Acute septic pulmonary embolism with acute cor pulmonale    Menorrhagia, premenopausal    Gallstones    Menorrhagia with irregular cycle     Current Outpatient Medications on File Prior to Visit   Medication Sig Dispense Refill    docusate sodium (COLACE) 100 MG capsule Take 1 capsule (100 mg total) by mouth 2 (two) times daily. 180 capsule 1    enoxaparin (LOVENOX) 40 mg/0.4 mL Syrg Inject 0.4 mLs (40 mg total) into the skin once daily. 30 each 6    hydrocortisone-pramoxine (ANALPRAM-HC) 2.5-1 % Crea Place rectally 3 (three) times daily. (Patient not taking: Reported on 9/27/2024) 30 g 0    pramoxine-hydrocortisone (PROCTOCREAM-HC) 1-1 % rectal cream Place rectally 3 (three) times daily. (Patient not taking: Reported on 9/27/2024) 30 g 0     No current facility-administered medications on file prior to visit.       Plan:   Labs today: none  Orders today:glucola at next visit   MEds today: none  Procedures Today: none  Follow up 3 Weeks, bleeding/pain precautions, kick counts, labor precautions

## 2024-11-07 ENCOUNTER — TELEPHONE (OUTPATIENT)
Facility: CLINIC | Age: 24
End: 2024-11-07
Payer: COMMERCIAL

## 2024-11-07 ENCOUNTER — OFFICE VISIT (OUTPATIENT)
Dept: MATERNAL FETAL MEDICINE | Facility: CLINIC | Age: 24
End: 2024-11-07
Payer: COMMERCIAL

## 2024-11-07 ENCOUNTER — PROCEDURE VISIT (OUTPATIENT)
Dept: MATERNAL FETAL MEDICINE | Facility: CLINIC | Age: 24
End: 2024-11-07
Payer: COMMERCIAL

## 2024-11-07 VITALS
HEIGHT: 66 IN | SYSTOLIC BLOOD PRESSURE: 109 MMHG | HEART RATE: 92 BPM | BODY MASS INDEX: 39.32 KG/M2 | WEIGHT: 244.69 LBS | DIASTOLIC BLOOD PRESSURE: 55 MMHG

## 2024-11-07 DIAGNOSIS — O99.210 OBESITY IN PREGNANCY, ANTEPARTUM: ICD-10-CM

## 2024-11-07 DIAGNOSIS — Z86.711 PERSONAL HISTORY OF PE (PULMONARY EMBOLISM): ICD-10-CM

## 2024-11-07 DIAGNOSIS — D50.8 OTHER IRON DEFICIENCY ANEMIA: ICD-10-CM

## 2024-11-07 DIAGNOSIS — O99.012 ANEMIA IN PREGNANCY, SECOND TRIMESTER: ICD-10-CM

## 2024-11-07 DIAGNOSIS — I26.99 BILATERAL PULMONARY EMBOLISM: Primary | ICD-10-CM

## 2024-11-07 DIAGNOSIS — Z36.2 ENCOUNTER FOR FOLLOW-UP ULTRASOUND OF FETAL ANATOMY: ICD-10-CM

## 2024-11-07 PROCEDURE — 99999 PR PBB SHADOW E&M-EST. PATIENT-LVL III: CPT | Mod: PBBFAC,,, | Performed by: STUDENT IN AN ORGANIZED HEALTH CARE EDUCATION/TRAINING PROGRAM

## 2024-11-07 PROCEDURE — 76816 OB US FOLLOW-UP PER FETUS: CPT | Mod: S$GLB,,, | Performed by: STUDENT IN AN ORGANIZED HEALTH CARE EDUCATION/TRAINING PROGRAM

## 2024-11-07 RX ORDER — SODIUM CHLORIDE 0.9 % (FLUSH) 0.9 %
10 SYRINGE (ML) INJECTION
OUTPATIENT
Start: 2024-11-07

## 2024-11-07 RX ORDER — DIPHENHYDRAMINE HYDROCHLORIDE 50 MG/ML
50 INJECTION INTRAMUSCULAR; INTRAVENOUS ONCE AS NEEDED
OUTPATIENT
Start: 2024-11-07

## 2024-11-07 RX ORDER — HEPARIN 100 UNIT/ML
500 SYRINGE INTRAVENOUS
OUTPATIENT
Start: 2024-11-07

## 2024-11-07 RX ORDER — EPINEPHRINE 0.3 MG/.3ML
0.3 INJECTION SUBCUTANEOUS ONCE AS NEEDED
OUTPATIENT
Start: 2024-11-07

## 2024-11-07 NOTE — PROGRESS NOTES
"Maternal Fetal Medicine Follow up  SUBJECTIVE:     Ricardo Rivera is a 24 y.o.  female with IUP at 23w5d who is seen for [facility] follow up for management of:    Problem   Bilateral Pulmonary Embolism   Iron Deficiency Anemia     Previous notes reviewed.   No changes to medical, surgical, family, social, or obstetric history.        Review of patient's allergies indicates:  No Known Allergies  Care team members:  Dr. Rodriguez - Primary OB  OBJECTIVE:   Blood Pressure: BP (!) 109/55   Pulse 92   Ht 5' 6" (1.676 m)   Wt 111 kg (244 lb 11.4 oz)   LMP 2024   BMI 39.50 kg/m²   Ultrasound performed. See viewpoint for full ultrasound report.  No fetal structural malformations are identified today; however, the fetal anatomic survey remains incomplete, as noted in the 'fetal anatomy' section of this report.  If further ultrasound exams are planned for other indications, will attempt completion of the anatomic survey at that time. No other routine f/u exams to attempt completion of the anatomic survey will be scheduled by [facility].  Fetal size is AGA, and the AFV is normal.   ASSESSMENT/PLAN:     24 y.o.  female with IUP at 23w5d presents for [facility] follow up.    Bilateral pulmonary embolism  Previously counseled  Appreciate Hematology's assistance  Chronic severe iron deficiency anemia  Will reach out to Dr. Whitehead's staff about IV iron infusion      Summary of Recommendations given her history:   Continue Lovenox 40 mg daily.  May need to increase if BMI >40  Repeat maternal echo due to history of PE- scheduling pending  Weekly IV Iron pending  Follow up with Hematology next month    Iron deficiency anemia  As above    FOLLOW UP:   4-6 weeks with [facility]   Radha Nguyen  Maternal-Fetal Medicine    Electronically Signed by Radha Nguyen 2024      "

## 2024-11-07 NOTE — TELEPHONE ENCOUNTER
----- Message from OLGA Wiseman sent at 11/7/2024 12:00 PM CST -----  Regarding: IV Fe infusions  Good Morning!  Dr Whitehead recommended this patient begin IV Iron infusions at her visit on 10/24/24.   Could you assist this patient in getting scheduled?    Thank you so much!  OLGA Wiseman, BSN  Maternal Fetal Medicine      Th iron orders had an error in location so the ordes are now fixed. I called and left a message for Ricardo letting her know that she should hear from scheduling within a day or so to get her scheduled. I asked her to call us on Monday afternoon if she has not heard from them so that it can be followed up on.

## 2024-11-07 NOTE — ASSESSMENT & PLAN NOTE
Previously counseled  Appreciate Hematology's assistance  Chronic severe iron deficiency anemia  Will reach out to Dr. Whitehead's staff about IV iron infusion      Summary of Recommendations given her history:   Continue Lovenox 40 mg daily.  May need to increase if BMI >40  Repeat maternal echo due to history of PE- scheduling pending  Weekly IV Iron pending  Follow up with Hematology next month

## 2024-11-09 ENCOUNTER — PATIENT MESSAGE (OUTPATIENT)
Dept: OTHER | Facility: OTHER | Age: 24
End: 2024-11-09
Payer: COMMERCIAL

## 2024-11-12 ENCOUNTER — TELEPHONE (OUTPATIENT)
Facility: CLINIC | Age: 24
End: 2024-11-12
Payer: COMMERCIAL

## 2024-11-12 NOTE — TELEPHONE ENCOUNTER
According to infusion nurse Sarah Martinez at [facility], Ricardo will be scheduled today for her first iron infusion.

## 2024-11-22 ENCOUNTER — ROUTINE PRENATAL (OUTPATIENT)
Dept: OBSTETRICS AND GYNECOLOGY | Facility: CLINIC | Age: 24
End: 2024-11-22
Payer: COMMERCIAL

## 2024-11-22 ENCOUNTER — LAB VISIT (OUTPATIENT)
Dept: LAB | Facility: HOSPITAL | Age: 24
End: 2024-11-22
Attending: SPECIALIST
Payer: COMMERCIAL

## 2024-11-22 VITALS — SYSTOLIC BLOOD PRESSURE: 106 MMHG | BODY MASS INDEX: 40.1 KG/M2 | WEIGHT: 248.44 LBS | DIASTOLIC BLOOD PRESSURE: 60 MMHG

## 2024-11-22 DIAGNOSIS — D50.0 IRON DEFICIENCY ANEMIA DUE TO CHRONIC BLOOD LOSS: ICD-10-CM

## 2024-11-22 DIAGNOSIS — Z3A.25 25 WEEKS GESTATION OF PREGNANCY: Primary | ICD-10-CM

## 2024-11-22 DIAGNOSIS — Z3A.21 21 WEEKS GESTATION OF PREGNANCY: ICD-10-CM

## 2024-11-22 LAB
BASOPHILS # BLD AUTO: 0.05 K/UL (ref 0–0.2)
BASOPHILS NFR BLD: 0.4 % (ref 0–1.9)
BILIRUBIN, UA POC OHS: NEGATIVE
BLOOD, UA POC OHS: NEGATIVE
CLARITY, UA POC OHS: CLEAR
COLOR, UA POC OHS: ABNORMAL
DIFFERENTIAL METHOD BLD: ABNORMAL
EOSINOPHIL # BLD AUTO: 0.1 K/UL (ref 0–0.5)
EOSINOPHIL NFR BLD: 0.9 % (ref 0–8)
ERYTHROCYTE [DISTWIDTH] IN BLOOD BY AUTOMATED COUNT: 20.3 % (ref 11.5–14.5)
GLUCOSE SERPL-MCNC: 125 MG/DL (ref 70–140)
GLUCOSE, UA POC OHS: NEGATIVE
HCT VFR BLD AUTO: 27.7 % (ref 37–48.5)
HGB BLD-MCNC: 7.2 G/DL (ref 12–16)
IMM GRANULOCYTES # BLD AUTO: 0.1 K/UL (ref 0–0.04)
IMM GRANULOCYTES NFR BLD AUTO: 0.8 % (ref 0–0.5)
KETONES, UA POC OHS: NEGATIVE
LEUKOCYTES, UA POC OHS: NEGATIVE
LYMPHOCYTES # BLD AUTO: 1.8 K/UL (ref 1–4.8)
LYMPHOCYTES NFR BLD: 15 % (ref 18–48)
MCH RBC QN AUTO: 17.4 PG (ref 27–31)
MCHC RBC AUTO-ENTMCNC: 26 G/DL (ref 32–36)
MCV RBC AUTO: 67 FL (ref 82–98)
MONOCYTES # BLD AUTO: 0.5 K/UL (ref 0.3–1)
MONOCYTES NFR BLD: 4.4 % (ref 4–15)
NEUTROPHILS # BLD AUTO: 9.3 K/UL (ref 1.8–7.7)
NEUTROPHILS NFR BLD: 78.5 % (ref 38–73)
NITRITE, UA POC OHS: NEGATIVE
NRBC BLD-RTO: 0 /100 WBC
PH, UA POC OHS: 6.5
PLATELET # BLD AUTO: 319 K/UL (ref 150–450)
PMV BLD AUTO: 10 FL (ref 9.2–12.9)
PROTEIN, UA POC OHS: NEGATIVE
RBC # BLD AUTO: 4.13 M/UL (ref 4–5.4)
SPECIFIC GRAVITY, UA POC OHS: 1.02
UROBILINOGEN, UA POC OHS: 1
WBC # BLD AUTO: 11.81 K/UL (ref 3.9–12.7)

## 2024-11-22 PROCEDURE — 36415 COLL VENOUS BLD VENIPUNCTURE: CPT | Mod: PN

## 2024-11-22 PROCEDURE — 82950 GLUCOSE TEST: CPT

## 2024-11-22 PROCEDURE — 85025 COMPLETE CBC W/AUTO DIFF WBC: CPT | Performed by: INTERNAL MEDICINE

## 2024-11-22 PROCEDURE — 99999 PR PBB SHADOW E&M-EST. PATIENT-LVL III: CPT | Mod: PBBFAC,,, | Performed by: SPECIALIST

## 2024-11-22 NOTE — PROGRESS NOTES
Complaints today:none  Completing GD screen today  Continues Lovenox for history PE, Good fetal movements reported No Bleeding or pains  Fe Def anemia and to repeat Cbc today    /60   Wt 112.7 kg (248 lb 7.3 oz)   LMP 2024   BMI 40.10 kg/m²     24 y.o., at 25w6d by Estimated Date of Delivery: 3/1/25  Patient Active Problem List   Diagnosis    Iron deficiency anemia    Iron deficiency anemia due to chronic blood loss    Carpal tunnel syndrome, right    Anxiety and depression    Engorgement of breasts, postpartum condition or complication    Bilateral pulmonary embolism    Acute septic pulmonary embolism with acute cor pulmonale    Menorrhagia, premenopausal    Gallstones    Menorrhagia with irregular cycle     OB History    Para Term  AB Living   3 1 1 0 1 1   SAB IAB Ectopic Multiple Live Births   1 0 0 0 1      # Outcome Date GA Lbr Jacobo/2nd Weight Sex Type Anes PTL Lv   3 Current            2 Term 21 39w0d  3.756 kg (8 lb 4.5 oz) M CS-LTranv Spinal  ANDRZEJ   1 SAB                Dating reviewed    Allergies and problem list reviewed and updated    Medical and surgical history reviewed    Prenatal labs reviewed and updated    Physical Exam:  ABD: soft, gravid, nontender,     Assessment:  IUP 26 weeks  Previous PE  Previous   Fe def anemia      Plan:   Fe therapy   CBC, GD screen  Rep[at u/s fetal growth with MFM  follow up 2 Weeks, bleeding/pain precautions   kick counts, labor precautions

## 2024-11-23 ENCOUNTER — PATIENT MESSAGE (OUTPATIENT)
Dept: OTHER | Facility: OTHER | Age: 24
End: 2024-11-23
Payer: COMMERCIAL

## 2024-11-26 ENCOUNTER — PATIENT MESSAGE (OUTPATIENT)
Dept: INFUSION THERAPY | Facility: HOSPITAL | Age: 24
End: 2024-11-26

## 2024-11-27 ENCOUNTER — TELEPHONE (OUTPATIENT)
Dept: INFUSION THERAPY | Facility: HOSPITAL | Age: 24
End: 2024-11-27

## 2024-11-27 NOTE — TELEPHONE ENCOUNTER
LVM for pt return call to the Infusion Department at [phone].  Attempting to schedule an appointment.  Will try again at a later time. amr

## 2024-11-29 ENCOUNTER — PATIENT MESSAGE (OUTPATIENT)
Dept: INFUSION THERAPY | Facility: HOSPITAL | Age: 24
End: 2024-11-29

## 2024-12-03 ENCOUNTER — PATIENT MESSAGE (OUTPATIENT)
Dept: INFUSION THERAPY | Facility: HOSPITAL | Age: 24
End: 2024-12-03

## 2024-12-06 ENCOUNTER — ROUTINE PRENATAL (OUTPATIENT)
Dept: OBSTETRICS AND GYNECOLOGY | Facility: CLINIC | Age: 24
End: 2024-12-06
Payer: COMMERCIAL

## 2024-12-06 VITALS
DIASTOLIC BLOOD PRESSURE: 64 MMHG | WEIGHT: 250.69 LBS | BODY MASS INDEX: 40.46 KG/M2 | SYSTOLIC BLOOD PRESSURE: 108 MMHG

## 2024-12-06 DIAGNOSIS — Z86.711 PERSONAL HISTORY OF PE (PULMONARY EMBOLISM): Primary | ICD-10-CM

## 2024-12-06 DIAGNOSIS — Z36.89 ENCOUNTER FOR ULTRASOUND TO ASSESS FETAL GROWTH: ICD-10-CM

## 2024-12-06 DIAGNOSIS — Z3A.27 27 WEEKS GESTATION OF PREGNANCY: Primary | ICD-10-CM

## 2024-12-06 DIAGNOSIS — O99.012 ANEMIA IN PREGNANCY, SECOND TRIMESTER: ICD-10-CM

## 2024-12-06 LAB
BILIRUBIN, UA POC OHS: NEGATIVE
BLOOD, UA POC OHS: NEGATIVE
CLARITY, UA POC OHS: CLEAR
COLOR, UA POC OHS: ABNORMAL
GLUCOSE, UA POC OHS: NEGATIVE
KETONES, UA POC OHS: ABNORMAL
LEUKOCYTES, UA POC OHS: ABNORMAL
NITRITE, UA POC OHS: NEGATIVE
PH, UA POC OHS: 6.5
PROTEIN, UA POC OHS: ABNORMAL
SPECIFIC GRAVITY, UA POC OHS: 1.02
UROBILINOGEN, UA POC OHS: 4

## 2024-12-06 PROCEDURE — 87086 URINE CULTURE/COLONY COUNT: CPT | Performed by: SPECIALIST

## 2024-12-06 PROCEDURE — 99999 PR PBB SHADOW E&M-EST. PATIENT-LVL III: CPT | Mod: PBBFAC,,, | Performed by: SPECIALIST

## 2024-12-06 NOTE — PROGRESS NOTES
Complaints today:Generalized discomfort , Good fetal movements reported No Bleeding or pains  Passed Gd screen    /64   Wt 113.7 kg (250 lb 10.6 oz)   LMP 2024   BMI 40.46 kg/m²     24 y.o., at 27w6d by Estimated Date of Delivery: 3/1/25  Patient Active Problem List   Diagnosis    Iron deficiency anemia    Iron deficiency anemia due to chronic blood loss    Carpal tunnel syndrome, right    Anxiety and depression    Engorgement of breasts, postpartum condition or complication    Bilateral pulmonary embolism    Acute septic pulmonary embolism with acute cor pulmonale    Menorrhagia, premenopausal    Gallstones    Menorrhagia with irregular cycle     OB History    Para Term  AB Living   3 1 1 0 1 1   SAB IAB Ectopic Multiple Live Births   1 0 0 0 1      # Outcome Date GA Lbr Jacobo/2nd Weight Sex Type Anes PTL Lv   3 Current            2 Term 21 39w0d  3.756 kg (8 lb 4.5 oz) M CS-LTranv Spinal  ANDRZEJ   1 SAB                Dating reviewed    Allergies and problem list reviewed and updated    Medical and surgical history reviewed    Prenatal labs reviewed and updated    Physical Exam:  ABD: soft, gravid, nontender,     Assessment:  IUP 27w6d  Previous   History of PE  Pt on Lovenox    Plan:   Fetal growth u/s with MFM next week  follow up 2 Weeks, bleeding/pain precautions   kick counts, labor precautions

## 2024-12-07 ENCOUNTER — PATIENT MESSAGE (OUTPATIENT)
Dept: OTHER | Facility: OTHER | Age: 24
End: 2024-12-07
Payer: COMMERCIAL

## 2024-12-10 ENCOUNTER — OFFICE VISIT (OUTPATIENT)
Dept: MATERNAL FETAL MEDICINE | Facility: CLINIC | Age: 24
End: 2024-12-10
Payer: COMMERCIAL

## 2024-12-10 ENCOUNTER — PROCEDURE VISIT (OUTPATIENT)
Dept: MATERNAL FETAL MEDICINE | Facility: CLINIC | Age: 24
End: 2024-12-10
Payer: COMMERCIAL

## 2024-12-10 VITALS
BODY MASS INDEX: 40.74 KG/M2 | DIASTOLIC BLOOD PRESSURE: 55 MMHG | WEIGHT: 253.5 LBS | HEIGHT: 66 IN | HEART RATE: 92 BPM | SYSTOLIC BLOOD PRESSURE: 113 MMHG

## 2024-12-10 DIAGNOSIS — Z86.711 PERSONAL HISTORY OF PE (PULMONARY EMBOLISM): ICD-10-CM

## 2024-12-10 DIAGNOSIS — I26.99 BILATERAL PULMONARY EMBOLISM: Primary | ICD-10-CM

## 2024-12-10 DIAGNOSIS — O99.013 ANEMIA OF MOTHER IN PREGNANCY, ANTEPARTUM, THIRD TRIMESTER: Primary | ICD-10-CM

## 2024-12-10 DIAGNOSIS — O99.013 ANEMIA IN PREGNANCY, THIRD TRIMESTER: ICD-10-CM

## 2024-12-10 DIAGNOSIS — O99.012 ANEMIA IN PREGNANCY, SECOND TRIMESTER: ICD-10-CM

## 2024-12-10 DIAGNOSIS — D50.8 OTHER IRON DEFICIENCY ANEMIA: ICD-10-CM

## 2024-12-10 DIAGNOSIS — Z36.89 ENCOUNTER FOR ULTRASOUND TO ASSESS FETAL GROWTH: Primary | ICD-10-CM

## 2024-12-10 DIAGNOSIS — Z36.89 ENCOUNTER FOR ULTRASOUND TO ASSESS FETAL GROWTH: ICD-10-CM

## 2024-12-10 PROCEDURE — 99214 OFFICE O/P EST MOD 30 MIN: CPT | Mod: S$GLB,,, | Performed by: STUDENT IN AN ORGANIZED HEALTH CARE EDUCATION/TRAINING PROGRAM

## 2024-12-10 PROCEDURE — 76816 OB US FOLLOW-UP PER FETUS: CPT | Mod: S$GLB,,, | Performed by: STUDENT IN AN ORGANIZED HEALTH CARE EDUCATION/TRAINING PROGRAM

## 2024-12-10 PROCEDURE — 3008F BODY MASS INDEX DOCD: CPT | Mod: CPTII,S$GLB,, | Performed by: STUDENT IN AN ORGANIZED HEALTH CARE EDUCATION/TRAINING PROGRAM

## 2024-12-10 PROCEDURE — 99999 PR PBB SHADOW E&M-EST. PATIENT-LVL III: CPT | Mod: PBBFAC,,, | Performed by: STUDENT IN AN ORGANIZED HEALTH CARE EDUCATION/TRAINING PROGRAM

## 2024-12-10 PROCEDURE — 3078F DIAST BP <80 MM HG: CPT | Mod: CPTII,S$GLB,, | Performed by: STUDENT IN AN ORGANIZED HEALTH CARE EDUCATION/TRAINING PROGRAM

## 2024-12-10 PROCEDURE — 1159F MED LIST DOCD IN RCRD: CPT | Mod: CPTII,S$GLB,, | Performed by: STUDENT IN AN ORGANIZED HEALTH CARE EDUCATION/TRAINING PROGRAM

## 2024-12-10 PROCEDURE — 3074F SYST BP LT 130 MM HG: CPT | Mod: CPTII,S$GLB,, | Performed by: STUDENT IN AN ORGANIZED HEALTH CARE EDUCATION/TRAINING PROGRAM

## 2024-12-10 RX ORDER — FERROUS SULFATE 325(65) MG
325 TABLET, DELAYED RELEASE (ENTERIC COATED) ORAL 2 TIMES DAILY
Qty: 180 TABLET | Refills: 0 | Status: SHIPPED | OUTPATIENT
Start: 2024-12-10 | End: 2025-03-10

## 2024-12-10 NOTE — PROGRESS NOTES
"Maternal Fetal Medicine Follow up  SUBJECTIVE:     Ricardo Rivera is a 24 y.o.  female with IUP at 28w3d who is seen for MFM follow up for management of:    Problem   Bilateral Pulmonary Embolism   Iron Deficiency Anemia     Previous notes reviewed.   No changes to medical, surgical, family, social, or obstetric history.      Review of patient's allergies indicates:  No Known Allergies  Care team members:  Michael Abarca - Primary OB  OBJECTIVE:   Blood Pressure: BP (!) 113/55   Pulse 92   Ht 5' 6" (1.676 m)   Wt 115 kg (253 lb 8.5 oz)   LMP 2024   BMI 40.92 kg/m²   Ultrasound performed. See viewpoint for full ultrasound report.  Fetal size is AGA with the EFW at the 49% and the AC at the 54%. The EFW is 1273 g.  A limited repeat fetal anatomic survey shows no abnormalities of the structures that were adequately imaged.  AFV is normal.   ASSESSMENT/PLAN:     24 y.o.  female with IUP at 28w3d presents for MFM follow up.    Bilateral pulmonary embolism  Previously counseled  Appreciate Hematology's assistance  Chronic severe iron deficiency anemia  She is hesitant about starting Iron infusions due to cost. States she just paid off her last pregnancy's IV infusions from .  For the time being I have prescribed her PO Iron and have given her numbers for estimation of costs and financial assistance program through [facility].      Summary of Recommendations given her history:   Continue Lovenox 40 mg daily.  BMI 40 today. Will assess next visit and increase to BID if >40  Echo completed and within normal limits  Weekly IV Iron pending  Follow up with Hematology - visit canceled. She is to reschedule    Iron deficiency anemia  As above    FOLLOW UP:     F/u in 32 weeks for US/MFM visit    Radha Nguyen  Maternal-Fetal Medicine    Electronically Signed by Radha Nguyen December 10, 2024      "

## 2024-12-10 NOTE — ASSESSMENT & PLAN NOTE
Previously counseled  Appreciate Hematology's assistance  Chronic severe iron deficiency anemia  She is hesitant about starting Iron infusions due to cost. States she just paid off her last pregnancy's IV infusions from 2021.  For the time being I have prescribed her PO Iron and have given her numbers for estimation of costs and financial assistance program through [facility].      Summary of Recommendations given her history:   Continue Lovenox 40 mg daily.  BMI 40 today. Will assess next visit and increase to BID if >40  Echo completed and within normal limits  Weekly IV Iron pending  Follow up with Hematology - visit canceled. She is to reschedule

## 2024-12-11 ENCOUNTER — TELEPHONE (OUTPATIENT)
Facility: CLINIC | Age: 24
End: 2024-12-11
Payer: COMMERCIAL

## 2024-12-11 NOTE — TELEPHONE ENCOUNTER
Message received from [facility] infusion center on regard to appointment that need to be scheduled for Iron infusion, patient is not answering. 2 unsuccessful intents to reach patient were made from this office, LVM.

## 2024-12-19 ENCOUNTER — ROUTINE PRENATAL (OUTPATIENT)
Dept: OBSTETRICS AND GYNECOLOGY | Facility: CLINIC | Age: 24
End: 2024-12-19
Payer: COMMERCIAL

## 2024-12-19 ENCOUNTER — TELEPHONE (OUTPATIENT)
Dept: OBSTETRICS AND GYNECOLOGY | Facility: CLINIC | Age: 24
End: 2024-12-19

## 2024-12-19 VITALS
DIASTOLIC BLOOD PRESSURE: 64 MMHG | WEIGHT: 255.75 LBS | SYSTOLIC BLOOD PRESSURE: 112 MMHG | BODY MASS INDEX: 41.28 KG/M2

## 2024-12-19 DIAGNOSIS — Z3A.29 29 WEEKS GESTATION OF PREGNANCY: Primary | ICD-10-CM

## 2024-12-19 LAB
BILIRUBIN, UA POC OHS: NEGATIVE
BLOOD, UA POC OHS: NEGATIVE
CLARITY, UA POC OHS: CLEAR
COLOR, UA POC OHS: YELLOW
GLUCOSE, UA POC OHS: NEGATIVE
KETONES, UA POC OHS: NEGATIVE
LEUKOCYTES, UA POC OHS: ABNORMAL
NITRITE, UA POC OHS: NEGATIVE
PH, UA POC OHS: 6.5
PROTEIN, UA POC OHS: 30
SPECIFIC GRAVITY, UA POC OHS: 1.02
UROBILINOGEN, UA POC OHS: >=8

## 2024-12-19 PROCEDURE — 99999 PR PBB SHADOW E&M-EST. PATIENT-LVL III: CPT | Mod: PBBFAC,,, | Performed by: SPECIALIST

## 2024-12-19 NOTE — TELEPHONE ENCOUNTER
----- Message from Rafael Rodriguez MD sent at 12/19/2024 10:07 AM CST -----  Pt on RSV list and contact when we get some

## 2024-12-19 NOTE — PROGRESS NOTES
Complaints today:None, Good fetal movements reported No Bleeding or pains  Discussed Lovenox throughout pregnancy and in addition, will administer RSV early due to pt woring in  and exposure    /64   Wt 116 kg (255 lb 11.7 oz)   LMP 2024   BMI 41.28 kg/m²     24 y.o., at 29w5d by Estimated Date of Delivery: 3/1/25  Patient Active Problem List   Diagnosis    Iron deficiency anemia    Iron deficiency anemia due to chronic blood loss    Carpal tunnel syndrome, right    Anxiety and depression    Engorgement of breasts, postpartum condition or complication    Bilateral pulmonary embolism    Acute septic pulmonary embolism with acute cor pulmonale    Menorrhagia, premenopausal    Gallstones    Menorrhagia with irregular cycle     OB History    Para Term  AB Living   3 1 1 0 1 1   SAB IAB Ectopic Multiple Live Births   1 0 0 0 1      # Outcome Date GA Lbr Jacobo/2nd Weight Sex Type Anes PTL Lv   3 Current            2 Term 21 39w0d  3.756 kg (8 lb 4.5 oz) M CS-LTranv Spinal  ANDRZEJ   1 SAB                Dating reviewed    Allergies and problem list reviewed and updated    Medical and surgical history reviewed    Prenatal labs reviewed and updated    Physical Exam:  ABD: soft, gravid, nontender,     Assessment:  IUP 29 weeks  Previous   H/O PE    Plan:   Repeat u/s for growth 2 weeks  Lovenox daily  follow up 2 Weeks, bleeding/pain precautions   kick counts, labor precautions

## 2024-12-20 ENCOUNTER — PATIENT MESSAGE (OUTPATIENT)
Dept: OBSTETRICS AND GYNECOLOGY | Facility: CLINIC | Age: 24
End: 2024-12-20
Payer: COMMERCIAL

## 2024-12-21 ENCOUNTER — PATIENT MESSAGE (OUTPATIENT)
Dept: OTHER | Facility: OTHER | Age: 24
End: 2024-12-21
Payer: COMMERCIAL

## 2025-01-02 ENCOUNTER — ROUTINE PRENATAL (OUTPATIENT)
Dept: OBSTETRICS AND GYNECOLOGY | Facility: CLINIC | Age: 25
End: 2025-01-02
Payer: COMMERCIAL

## 2025-01-02 VITALS
BODY MASS INDEX: 41.06 KG/M2 | SYSTOLIC BLOOD PRESSURE: 102 MMHG | WEIGHT: 254.44 LBS | DIASTOLIC BLOOD PRESSURE: 84 MMHG

## 2025-01-02 DIAGNOSIS — I26.99 BILATERAL PULMONARY EMBOLISM: ICD-10-CM

## 2025-01-02 DIAGNOSIS — Z3A.31 31 WEEKS GESTATION OF PREGNANCY: Primary | ICD-10-CM

## 2025-01-02 LAB
BILIRUBIN, UA POC OHS: NEGATIVE
BLOOD, UA POC OHS: NEGATIVE
CLARITY, UA POC OHS: CLEAR
COLOR, UA POC OHS: YELLOW
GLUCOSE, UA POC OHS: NEGATIVE
KETONES, UA POC OHS: NEGATIVE
LEUKOCYTES, UA POC OHS: ABNORMAL
NITRITE, UA POC OHS: NEGATIVE
PH, UA POC OHS: 7
PROTEIN, UA POC OHS: NEGATIVE
SPECIFIC GRAVITY, UA POC OHS: 1.02
UROBILINOGEN, UA POC OHS: 1

## 2025-01-02 PROCEDURE — 99999 PR PBB SHADOW E&M-EST. PATIENT-LVL III: CPT | Mod: PBBFAC,,, | Performed by: SPECIALIST

## 2025-01-02 RX ORDER — ENOXAPARIN SODIUM 100 MG/ML
40 INJECTION SUBCUTANEOUS DAILY
Qty: 30 EACH | Refills: 6 | Status: SHIPPED | OUTPATIENT
Start: 2025-01-02

## 2025-01-02 NOTE — PROGRESS NOTES
Complaints today:States she missed 2 days Lovenox dosing due to running out of med but did not request refill, Good fetal movements reported No Bleeding or pains  Pt is reporting some depressive episodes recently    /84   Wt 115.4 kg (254 lb 6.6 oz)   LMP 2024   BMI 41.06 kg/m²     24 y.o., at 31w5d by Estimated Date of Delivery: 3/1/25  Patient Active Problem List   Diagnosis    Iron deficiency anemia    Iron deficiency anemia due to chronic blood loss    Carpal tunnel syndrome, right    Anxiety and depression    Engorgement of breasts, postpartum condition or complication    Bilateral pulmonary embolism    Acute septic pulmonary embolism with acute cor pulmonale    Menorrhagia, premenopausal    Gallstones    Menorrhagia with irregular cycle     OB History    Para Term  AB Living   3 1 1 0 1 1   SAB IAB Ectopic Multiple Live Births   1 0 0 0 1      # Outcome Date GA Lbr Jacobo/2nd Weight Sex Type Anes PTL Lv   3 Current            2 Term 21 39w0d  3.756 kg (8 lb 4.5 oz) M CS-LTranv Spinal  ANDRZEJ   1 SAB                Dating reviewed    Allergies and problem list reviewed and updated    Medical and surgical history reviewed    Prenatal labs reviewed and updated    Physical Exam:  ABD: soft, gravid, nontender,     Assessment:  31w5d  PE history  Previous   High risk PP depression    Plan:   Discussed importance or PE prophylaxis  Discussed depression score and risks and rec Wellbutrin trial  Pt will consider  Fetal growth u/s MFM next week    follow up 2 Weeks, bleeding/pain precautions   kick counts, labor precautions            
No

## 2025-01-04 ENCOUNTER — PATIENT MESSAGE (OUTPATIENT)
Dept: OTHER | Facility: OTHER | Age: 25
End: 2025-01-04
Payer: COMMERCIAL

## 2025-01-07 ENCOUNTER — OCHSNER VIRTUAL EMERGENCY DEPARTMENT (OUTPATIENT)
Facility: CLINIC | Age: 25
End: 2025-01-07
Payer: COMMERCIAL

## 2025-01-07 ENCOUNTER — NURSE TRIAGE (OUTPATIENT)
Dept: ADMINISTRATIVE | Facility: CLINIC | Age: 25
End: 2025-01-07
Payer: COMMERCIAL

## 2025-01-07 NOTE — TELEPHONE ENCOUNTER
Patient's call transferred as a priority call from [facility]. Patient states she is 32 weeks gestation and c/o decreased fetal movement for approximately 2 days and dizziness. Patient denies fluid leakage from the vaginal area or contractions at this time.     Case disposition advised to Go to [facility] or to Office with PCP  Approval. Ben On Call Provider, Dr. Shiv Winters advised for patient to Go to [facility] for evaluation.     Patient advised to Go to [facility] for evaluation and to contact the [facility] on Call Service for any worsening symptoms or questions. Patient states understanding of care advice.     Reason for Disposition   Pregnant 23 or more weeks and baby moving less today by kick count (e.g., kick count < 5 in 1 hour or < 10 in 2 hours)    Additional Information   Negative: Sounds like a life-threatening emergency to the triager   Negative: Pregnant > 36 weeks (i.e., term) and having contractions or other symptoms of labor   Negative: Pregnant < 37 weeks (i.e., ) and having contractions or other symptoms of labor   Negative: Pregnant > 20 weeks and having abdominal pain   Negative: Pregnant > 20 weeks and having vaginal bleeding or spotting   Negative: Blurred vision or visual change   Negative: SEVERE headache and not relieved with acetaminophen (e.g., Tylenol)   Negative: Leakage of fluid from vagina    Protocols used: Pregnancy - Decreased Fetal Movement-A-[place]

## 2025-01-07 NOTE — PLAN OF CARE-OVED
[facility] Emergency Department Plan of Care Note    Referral source: Nurse On-Call      Reason for consult: Dizziness      Additional History: Patient states she is 32 weeks gestation and c/o decreased fetal movement for approximately 2 days and dizziness. Patient denies fluid leakage from the vaginal area or contractions at this time       Disposition recommended: Specialist Referral: To Labor and Delivery now with decreased fetal movement

## 2025-01-08 ENCOUNTER — PROCEDURE VISIT (OUTPATIENT)
Dept: MATERNAL FETAL MEDICINE | Facility: CLINIC | Age: 25
End: 2025-01-08
Payer: COMMERCIAL

## 2025-01-08 ENCOUNTER — OFFICE VISIT (OUTPATIENT)
Dept: MATERNAL FETAL MEDICINE | Facility: CLINIC | Age: 25
End: 2025-01-08
Payer: COMMERCIAL

## 2025-01-08 ENCOUNTER — PATIENT MESSAGE (OUTPATIENT)
Dept: MATERNAL FETAL MEDICINE | Facility: CLINIC | Age: 25
End: 2025-01-08

## 2025-01-08 VITALS
BODY MASS INDEX: 41.45 KG/M2 | HEART RATE: 96 BPM | HEIGHT: 66 IN | DIASTOLIC BLOOD PRESSURE: 60 MMHG | WEIGHT: 257.94 LBS | SYSTOLIC BLOOD PRESSURE: 133 MMHG

## 2025-01-08 DIAGNOSIS — Z36.89 ENCOUNTER FOR ULTRASOUND TO ASSESS FETAL GROWTH: ICD-10-CM

## 2025-01-08 DIAGNOSIS — D50.8 OTHER IRON DEFICIENCY ANEMIA: Primary | ICD-10-CM

## 2025-01-08 DIAGNOSIS — I26.99 BILATERAL PULMONARY EMBOLISM: ICD-10-CM

## 2025-01-08 PROCEDURE — 76816 OB US FOLLOW-UP PER FETUS: CPT | Mod: S$GLB,,, | Performed by: STUDENT IN AN ORGANIZED HEALTH CARE EDUCATION/TRAINING PROGRAM

## 2025-01-08 PROCEDURE — 99999 PR PBB SHADOW E&M-EST. PATIENT-LVL III: CPT | Mod: PBBFAC,,, | Performed by: STUDENT IN AN ORGANIZED HEALTH CARE EDUCATION/TRAINING PROGRAM

## 2025-01-08 NOTE — PROGRESS NOTES
"Maternal Fetal Medicine Follow up  SUBJECTIVE:     Ricardo Rivera is a 24 y.o.  female with IUP at 32w4d who is seen for MFM follow up for management of:    Problem   Bilateral Pulmonary Embolism   Iron Deficiency Anemia     Previous notes reviewed.   No changes to medical, surgical, family, social, or obstetric history.  Review of patient's allergies indicates:  No Known Allergies  Care team members:  Dr. Rodriguez - Primary OB  OBJECTIVE:   Blood Pressure: /60   Pulse 96   Ht 5' 6" (1.676 m)   Wt 117 kg (257 lb 15 oz)   LMP 2024   BMI 41.63 kg/m²   Ultrasound performed. See viewpoint for full ultrasound report.  Fetal size is AGA with the EFW at the 33% and the AC at the 78%. The EFW is 2096 g.  A limited repeat fetal anatomic survey shows no abnormalities of the structures that were adequately imaged.  AFV is normal.   ASSESSMENT/PLAN:     24 y.o.  female with IUP at 32w4d presents for MFM follow up.    Bilateral pulmonary embolism  Previously counseled  Appreciate Hematology's assistance  Chronic severe iron deficiency anemia  At our last visit, she was hesitant about starting Iron infusions due to cost. States she just paid off her last pregnancy's IV infusions from .  She is on PO iron and I have ordered a repeat CBC today  I contacted Dr. Whitehead and his staff. She has agreed to at least one iron infusion and in the meantime has also applied for Medicaid to ideally help cover the rest.  She was to have a repeat appt with Hematology however did not schedule- will have her see them if possible within the month and formulate a plan.  If Hgb not improved, will likely need a blood transfusion by 34-36 weeks in preparation for delivery.      Summary of Recommendations given her history:   Continue Lovenox 40 mg daily.   Echo completed and within normal limits  Weekly IV Iron pending  Follow up with Hematology - visit canceled. She is to reschedule  Continue PO Iron  Follow up CBC " and formulate plan to increase hemoglobin    Iron deficiency anemia  As above  Severe anemia      FOLLOW UP:     F/u in 4 weeks for US/MFM visit      Radha Nguyen  Maternal-Fetal Medicine    Electronically Signed by Radha Nguyen January 8, 2025

## 2025-01-08 NOTE — ASSESSMENT & PLAN NOTE
Previously counseled  Appreciate Hematology's assistance  Chronic severe iron deficiency anemia  At our last visit, she was hesitant about starting Iron infusions due to cost. States she just paid off her last pregnancy's IV infusions from 2021.  She is on PO iron and I have ordered a repeat CBC today  I contacted Dr. Whitehead and his staff. She has agreed to at least one iron infusion and in the meantime has also applied for Medicaid to ideally help cover the rest.  She was to have a repeat appt with Hematology however did not schedule- will have her see them if possible within the month and formulate a plan.  If Hgb not improved, will likely need a blood transfusion by 34-36 weeks in preparation for delivery.      Summary of Recommendations given her history:   Continue Lovenox 40 mg daily.   Echo completed and within normal limits  Weekly IV Iron pending  Follow up with Hematology - visit canceled. She is to reschedule  Continue PO Iron  Follow up CBC and formulate plan to increase hemoglobin

## 2025-01-08 NOTE — PROGRESS NOTES
Pt scheduled for follow-up MFM appt. Pt given number to schedule ordered iron infusion. Pt to have CBC drawn at next OB appointment on 1/16/25. Epic Staff message sent to OB.  Portal message sent with number to schedule Iron infusion.

## 2025-01-09 ENCOUNTER — TELEPHONE (OUTPATIENT)
Facility: CLINIC | Age: 25
End: 2025-01-09
Payer: COMMERCIAL

## 2025-01-16 ENCOUNTER — ROUTINE PRENATAL (OUTPATIENT)
Dept: OBSTETRICS AND GYNECOLOGY | Facility: CLINIC | Age: 25
End: 2025-01-16
Payer: COMMERCIAL

## 2025-01-16 ENCOUNTER — LAB VISIT (OUTPATIENT)
Dept: LAB | Facility: HOSPITAL | Age: 25
End: 2025-01-16
Attending: SPECIALIST
Payer: COMMERCIAL

## 2025-01-16 VITALS
DIASTOLIC BLOOD PRESSURE: 60 MMHG | WEIGHT: 261.25 LBS | SYSTOLIC BLOOD PRESSURE: 102 MMHG | BODY MASS INDEX: 42.17 KG/M2

## 2025-01-16 DIAGNOSIS — O99.013 ANEMIA IN PREGNANCY, THIRD TRIMESTER: ICD-10-CM

## 2025-01-16 DIAGNOSIS — Z3A.33 33 WEEKS GESTATION OF PREGNANCY: Primary | ICD-10-CM

## 2025-01-16 LAB
BASOPHILS # BLD AUTO: 0.05 K/UL (ref 0–0.2)
BASOPHILS NFR BLD: 0.4 % (ref 0–1.9)
BILIRUBIN, UA POC OHS: NEGATIVE
BLOOD, UA POC OHS: NEGATIVE
CLARITY, UA POC OHS: ABNORMAL
COLOR, UA POC OHS: ABNORMAL
DIFFERENTIAL METHOD BLD: ABNORMAL
EOSINOPHIL # BLD AUTO: 0.1 K/UL (ref 0–0.5)
EOSINOPHIL NFR BLD: 0.8 % (ref 0–8)
ERYTHROCYTE [DISTWIDTH] IN BLOOD BY AUTOMATED COUNT: 21.1 % (ref 11.5–14.5)
GLUCOSE, UA POC OHS: NEGATIVE
HCT VFR BLD AUTO: 25.8 % (ref 37–48.5)
HGB BLD-MCNC: 6.7 G/DL (ref 12–16)
IMM GRANULOCYTES # BLD AUTO: 0.07 K/UL (ref 0–0.04)
IMM GRANULOCYTES NFR BLD AUTO: 0.6 % (ref 0–0.5)
KETONES, UA POC OHS: NEGATIVE
LEUKOCYTES, UA POC OHS: NEGATIVE
LYMPHOCYTES # BLD AUTO: 1.7 K/UL (ref 1–4.8)
LYMPHOCYTES NFR BLD: 13.5 % (ref 18–48)
MCH RBC QN AUTO: 17 PG (ref 27–31)
MCHC RBC AUTO-ENTMCNC: 26 G/DL (ref 32–36)
MCV RBC AUTO: 65 FL (ref 82–98)
MONOCYTES # BLD AUTO: 0.6 K/UL (ref 0.3–1)
MONOCYTES NFR BLD: 4.7 % (ref 4–15)
NEUTROPHILS # BLD AUTO: 10 K/UL (ref 1.8–7.7)
NEUTROPHILS NFR BLD: 80 % (ref 38–73)
NITRITE, UA POC OHS: NEGATIVE
NRBC BLD-RTO: 0 /100 WBC
PH, UA POC OHS: 6
PLATELET # BLD AUTO: 292 K/UL (ref 150–450)
PMV BLD AUTO: 10 FL (ref 9.2–12.9)
PROTEIN, UA POC OHS: 30
RBC # BLD AUTO: 3.95 M/UL (ref 4–5.4)
SPECIFIC GRAVITY, UA POC OHS: >=1.03
UROBILINOGEN, UA POC OHS: 1
WBC # BLD AUTO: 12.52 K/UL (ref 3.9–12.7)

## 2025-01-16 PROCEDURE — 0502F SUBSEQUENT PRENATAL CARE: CPT | Mod: CPTII,S$GLB,, | Performed by: SPECIALIST

## 2025-01-16 PROCEDURE — 36415 COLL VENOUS BLD VENIPUNCTURE: CPT | Mod: PN | Performed by: SPECIALIST

## 2025-01-16 PROCEDURE — 85025 COMPLETE CBC W/AUTO DIFF WBC: CPT | Performed by: SPECIALIST

## 2025-01-16 PROCEDURE — 99999 PR PBB SHADOW E&M-EST. PATIENT-LVL III: CPT | Mod: PBBFAC,,, | Performed by: SPECIALIST

## 2025-01-16 NOTE — PROGRESS NOTES
Complaints today:none , Good fetal movements reported No Bleeding or pains  Repeating CBC this am to follow significant anemia history Pt was referred and scheduled for Venofir Infusion but concelled due to cost. I discussed potential effects of anemia, ir IUGR in addition, pt is to be scheduled for repeat  and thus preop anemia an additional risk  Pt admits to good fetal movement and states compliance with Lovenox therapy    /60   Wt 118.5 kg (261 lb 3.9 oz)   LMP 2024   BMI 42.17 kg/m²     24 y.o., at 33w5d by Estimated Date of Delivery: 3/1/25  Patient Active Problem List   Diagnosis    Iron deficiency anemia    Iron deficiency anemia due to chronic blood loss    Carpal tunnel syndrome, right    Anxiety and depression    Engorgement of breasts, postpartum condition or complication    Bilateral pulmonary embolism    Acute septic pulmonary embolism with acute cor pulmonale    Menorrhagia, premenopausal    Gallstones    Menorrhagia with irregular cycle     OB History    Para Term  AB Living   3 1 1 0 1 1   SAB IAB Ectopic Multiple Live Births   1 0 0 0 1      # Outcome Date GA Lbr Jacobo/2nd Weight Sex Type Anes PTL Lv   3 Current            2 Term 21 39w0d  3.756 kg (8 lb 4.5 oz) M CS-LTranv Spinal  ANDRZEJ   1 SAB                Dating reviewed    Allergies and problem list reviewed and updated    Medical and surgical history reviewed    Prenatal labs reviewed and updated    Physical Exam:  ABD: soft, gravid, nontender,     Assessment:  IUP 33w5d  Previous   H/O PE  Severe anemia    Plan:   Awaiting CBC  Possible transfusion therapy next week  Weekly fetal testing  follow up 1 Weeks, bleeding/pain precautions   kick counts, labor precautions

## 2025-01-24 ENCOUNTER — ROUTINE PRENATAL (OUTPATIENT)
Dept: OBSTETRICS AND GYNECOLOGY | Facility: CLINIC | Age: 25
End: 2025-01-24
Payer: COMMERCIAL

## 2025-01-24 VITALS
WEIGHT: 262.13 LBS | DIASTOLIC BLOOD PRESSURE: 70 MMHG | BODY MASS INDEX: 42.31 KG/M2 | SYSTOLIC BLOOD PRESSURE: 114 MMHG

## 2025-01-24 DIAGNOSIS — Z3A.35 35 WEEKS GESTATION OF PREGNANCY: Primary | ICD-10-CM

## 2025-01-24 LAB
BILIRUBIN, UA POC OHS: NEGATIVE
BLOOD, UA POC OHS: NEGATIVE
CLARITY, UA POC OHS: ABNORMAL
COLOR, UA POC OHS: ABNORMAL
GLUCOSE, UA POC OHS: NEGATIVE
KETONES, UA POC OHS: NEGATIVE
LEUKOCYTES, UA POC OHS: ABNORMAL
NITRITE, UA POC OHS: NEGATIVE
PH, UA POC OHS: 6.5
PROTEIN, UA POC OHS: 30
SPECIFIC GRAVITY, UA POC OHS: >=1.03
UROBILINOGEN, UA POC OHS: 1

## 2025-01-24 PROCEDURE — 87086 URINE CULTURE/COLONY COUNT: CPT | Performed by: SPECIALIST

## 2025-01-24 PROCEDURE — 59025 FETAL NON-STRESS TEST: CPT | Mod: S$GLB,,, | Performed by: SPECIALIST

## 2025-01-24 PROCEDURE — 99999 PR PBB SHADOW E&M-EST. PATIENT-LVL III: CPT | Mod: PBBFAC,,, | Performed by: SPECIALIST

## 2025-01-24 PROCEDURE — 0502F SUBSEQUENT PRENATAL CARE: CPT | Mod: CPTII,S$GLB,, | Performed by: SPECIALIST

## 2025-01-24 NOTE — PROGRESS NOTES
Complaints today:Denies DFM, Good fetal movements reported No Bleeding or pains  SPOUSE PRESENT AND PT ADMITS TO NONCOMPLIANCE WITH LOVENOX DOSING  IS MISSING TAKING FOR SEVERAL DAYS  I DISCUSSED RISKS OF RECURRENT THROMBOSIS AND IMPORTANCE OF COMPLIANCE WITH DAILY DOSING    FETAL SURVEILLANCE TESTING SUMMARY  INDICATIONS:  Anemia, Hx PE     Fetal doppler heart rate tracing obtained in the usual fashion with the patient in the left supine position.     OBJECTIVE RESULTS:  Baseline fetal heart rate: 144     Fetal heart variability: present  Fetal Heart Rate decelerations:none  Fetal acoustic stimulator: no  Fetal Heart Rate accelerations: present  Fetal Non-stress Test: Reactive  Uterine irritability:No     Fetal surveillance: Reasurring        /70   Wt 118.9 kg (262 lb 2 oz)   LMP 2024   BMI 42.31 kg/m²     24 y.o., at 34w6d by Estimated Date of Delivery: 3/1/25  Patient Active Problem List   Diagnosis    Iron deficiency anemia    Iron deficiency anemia due to chronic blood loss    Carpal tunnel syndrome, right    Anxiety and depression    Engorgement of breasts, postpartum condition or complication    Bilateral pulmonary embolism    Acute septic pulmonary embolism with acute cor pulmonale    Menorrhagia, premenopausal    Gallstones    Menorrhagia with irregular cycle     OB History    Para Term  AB Living   3 1 1 0 1 1   SAB IAB Ectopic Multiple Live Births   1 0 0 0 1      # Outcome Date GA Lbr Jacobo/2nd Weight Sex Type Anes PTL Lv   3 Current            2 Term 21 39w0d  3.756 kg (8 lb 4.5 oz) M CS-LTranv Spinal  ANDRZEJ   1 SAB                Dating reviewed    Allergies and problem list reviewed and updated    Medical and surgical history reviewed    Prenatal labs reviewed and updated    Physical Exam:  ABD: soft, gravid, nontender,     Assessment:  IUP 34w6d  Previous   History PE    Medical noncomplaince  Significant anemia      Plan:   Reinterated importance of  compliance  In addition, discussed and rec transfusion and will admit for 2 units PRBCS and fetal Monitoring Monday morning 9am  follow up Monday, bleeding/pain precautions   kick counts, labor precautions

## 2025-01-25 ENCOUNTER — PATIENT MESSAGE (OUTPATIENT)
Dept: OTHER | Facility: OTHER | Age: 25
End: 2025-01-25
Payer: COMMERCIAL

## 2025-01-26 LAB
BACTERIA UR CULT: NORMAL
BACTERIA UR CULT: NORMAL

## 2025-01-27 PROBLEM — O99.019 ANEMIA AFFECTING PREGNANCY: Status: ACTIVE | Noted: 2025-01-27

## 2025-01-30 ENCOUNTER — TELEPHONE (OUTPATIENT)
Dept: OBSTETRICS AND GYNECOLOGY | Facility: CLINIC | Age: 25
End: 2025-01-30

## 2025-01-30 ENCOUNTER — ROUTINE PRENATAL (OUTPATIENT)
Dept: OBSTETRICS AND GYNECOLOGY | Facility: CLINIC | Age: 25
End: 2025-01-30
Payer: COMMERCIAL

## 2025-01-30 VITALS — DIASTOLIC BLOOD PRESSURE: 62 MMHG | BODY MASS INDEX: 42.45 KG/M2 | WEIGHT: 263 LBS | SYSTOLIC BLOOD PRESSURE: 120 MMHG

## 2025-01-30 DIAGNOSIS — Z3A.35 35 WEEKS GESTATION OF PREGNANCY: Primary | ICD-10-CM

## 2025-01-30 DIAGNOSIS — O99.013 ANEMIA AFFECTING PREGNANCY IN THIRD TRIMESTER: ICD-10-CM

## 2025-01-30 LAB
BILIRUBIN, UA POC OHS: NEGATIVE
BLOOD, UA POC OHS: NEGATIVE
CLARITY, UA POC OHS: CLEAR
COLOR, UA POC OHS: YELLOW
GLUCOSE, UA POC OHS: NEGATIVE
KETONES, UA POC OHS: NEGATIVE
LEUKOCYTES, UA POC OHS: ABNORMAL
NITRITE, UA POC OHS: NEGATIVE
PH, UA POC OHS: 6
PROTEIN, UA POC OHS: NEGATIVE
SPECIFIC GRAVITY, UA POC OHS: >=1.03
UROBILINOGEN, UA POC OHS: 0.2

## 2025-01-30 PROCEDURE — 0502F SUBSEQUENT PRENATAL CARE: CPT | Mod: CPTII,S$GLB,, | Performed by: SPECIALIST

## 2025-01-30 PROCEDURE — 59025 FETAL NON-STRESS TEST: CPT | Mod: S$GLB,,, | Performed by: SPECIALIST

## 2025-01-30 PROCEDURE — 99999 PR PBB SHADOW E&M-EST. PATIENT-LVL III: CPT | Mod: PBBFAC,,, | Performed by: SPECIALIST

## 2025-01-30 NOTE — PROGRESS NOTES
Complaints today:Relative DFM x 1 day, No Bleeding or pains    FETAL SURVEILLANCE TESTING SUMMARY  INDICATIONS:  Severe anemia     Fetal doppler heart rate tracing obtained in the usual fashion with the patient in the left supine position.     OBJECTIVE RESULTS:  Baseline fetal heart rate: 115     Fetal heart variability: decreased  Fetal Heart Rate decelerations:no  Fetal acoustic stimulator: no  Fetal Heart Rate accelerations: present  Fetal Non-stress Test: NR  Uterine irritability:no             /62   Wt 119.3 kg (263 lb 0.1 oz)   LMP 2024   BMI 42.45 kg/m²     24 y.o., at 35w5d by Estimated Date of Delivery: 3/1/25  Patient Active Problem List   Diagnosis    Iron deficiency anemia    Iron deficiency anemia due to chronic blood loss    Carpal tunnel syndrome, right    Anxiety and depression    Engorgement of breasts, postpartum condition or complication    Bilateral pulmonary embolism    Acute septic pulmonary embolism with acute cor pulmonale    Menorrhagia, premenopausal    Gallstones    Menorrhagia with irregular cycle    Anemia affecting pregnancy     OB History    Para Term  AB Living   3 1 1 0 1 1   SAB IAB Ectopic Multiple Live Births   1 0 0 0 1      # Outcome Date GA Lbr Jacobo/2nd Weight Sex Type Anes PTL Lv   3 Current            2 Term 21 39w0d  3.756 kg (8 lb 4.5 oz) M CS-LTranv Spinal  ANDRZEJ   1 SAB                Dating reviewed    Allergies and problem list reviewed and updated    Medical and surgical history reviewed    Prenatal labs reviewed and updated    Physical Exam:  ABD: soft, gravid, nontender,     Assessment:  IUP 35w5d  Previous   Severe anemia    Plan:   Discussed equivacol testing and will send to OB ED for prolonged monitoring

## 2025-01-30 NOTE — TELEPHONE ENCOUNTER
----- Message from Amador sent at 1/30/2025  2:40 PM CST -----  Type:  Sooner Appointment Request    Caller is requesting a sooner appointment.  Caller declined first available appointment listed below.  Caller will not accept being placed on the waitlist and is requesting a message be sent to doctor.    Name of Caller:  pt  When is the first available appointment?  N/A  Symptoms:  OB  Would the patient rather a call back or a response via [facility]? Call  Best Call Back Number:  [phone]  Additional Information:  pt needs to be seen next Thursday. Please call back to advise. Thanks!

## 2025-02-03 ENCOUNTER — OFFICE VISIT (OUTPATIENT)
Dept: MATERNAL FETAL MEDICINE | Facility: CLINIC | Age: 25
End: 2025-02-03
Payer: COMMERCIAL

## 2025-02-03 ENCOUNTER — PROCEDURE VISIT (OUTPATIENT)
Dept: MATERNAL FETAL MEDICINE | Facility: CLINIC | Age: 25
End: 2025-02-03
Payer: COMMERCIAL

## 2025-02-03 VITALS
WEIGHT: 264.31 LBS | HEIGHT: 66 IN | DIASTOLIC BLOOD PRESSURE: 59 MMHG | SYSTOLIC BLOOD PRESSURE: 118 MMHG | HEART RATE: 86 BPM | BODY MASS INDEX: 42.48 KG/M2

## 2025-02-03 DIAGNOSIS — D50.8 OTHER IRON DEFICIENCY ANEMIA: ICD-10-CM

## 2025-02-03 DIAGNOSIS — Z36.89 ENCOUNTER FOR ULTRASOUND TO ASSESS FETAL GROWTH: ICD-10-CM

## 2025-02-03 DIAGNOSIS — O99.013 ANEMIA IN PREGNANCY, THIRD TRIMESTER: ICD-10-CM

## 2025-02-03 DIAGNOSIS — I26.99 BILATERAL PULMONARY EMBOLISM: Primary | ICD-10-CM

## 2025-02-03 PROCEDURE — 3008F BODY MASS INDEX DOCD: CPT | Mod: CPTII,S$GLB,, | Performed by: STUDENT IN AN ORGANIZED HEALTH CARE EDUCATION/TRAINING PROGRAM

## 2025-02-03 PROCEDURE — 3074F SYST BP LT 130 MM HG: CPT | Mod: CPTII,S$GLB,, | Performed by: STUDENT IN AN ORGANIZED HEALTH CARE EDUCATION/TRAINING PROGRAM

## 2025-02-03 PROCEDURE — 76816 OB US FOLLOW-UP PER FETUS: CPT | Mod: S$GLB,,, | Performed by: STUDENT IN AN ORGANIZED HEALTH CARE EDUCATION/TRAINING PROGRAM

## 2025-02-03 PROCEDURE — 99214 OFFICE O/P EST MOD 30 MIN: CPT | Mod: S$GLB,,, | Performed by: STUDENT IN AN ORGANIZED HEALTH CARE EDUCATION/TRAINING PROGRAM

## 2025-02-03 PROCEDURE — 1159F MED LIST DOCD IN RCRD: CPT | Mod: CPTII,S$GLB,, | Performed by: STUDENT IN AN ORGANIZED HEALTH CARE EDUCATION/TRAINING PROGRAM

## 2025-02-03 PROCEDURE — 3078F DIAST BP <80 MM HG: CPT | Mod: CPTII,S$GLB,, | Performed by: STUDENT IN AN ORGANIZED HEALTH CARE EDUCATION/TRAINING PROGRAM

## 2025-02-03 PROCEDURE — 99999 PR PBB SHADOW E&M-EST. PATIENT-LVL III: CPT | Mod: PBBFAC,,, | Performed by: STUDENT IN AN ORGANIZED HEALTH CARE EDUCATION/TRAINING PROGRAM

## 2025-02-03 PROCEDURE — 1111F DSCHRG MED/CURRENT MED MERGE: CPT | Mod: CPTII,S$GLB,, | Performed by: STUDENT IN AN ORGANIZED HEALTH CARE EDUCATION/TRAINING PROGRAM

## 2025-02-03 NOTE — PROGRESS NOTES
"Maternal Fetal Medicine Follow up  SUBJECTIVE:     Ricardo Rivera is a 24 y.o.  female with IUP at 36w2d who is seen for MFM follow up for management of:    Problem   Bilateral Pulmonary Embolism   Iron Deficiency Anemia     Previous notes reviewed.   No changes to medical, surgical, family, social, or obstetric history.      Review of patient's allergies indicates:  No Known Allergies  Care team members:  Dr. Rodriguez - Primary OB  OBJECTIVE:   Blood Pressure: BP (!) 118/59   Pulse 86   Ht 5' 6" (1.676 m)   Wt 119.9 kg (264 lb 5.3 oz)   LMP 2024   BMI 42.66 kg/m²   Ultrasound performed. See viewpoint for full ultrasound report.  Fetal size is AGA with the EFW plotting at the 51% and the AC plotting at the 83%.   The EFW is 3026 g.  A limited repeat fetal anatomic survey shows no abnormalities of the structures that were adequately imaged.    The BPP score is reassuring at 8/8, and the AFV is normal.   ASSESSMENT/PLAN:     24 y.o.  female with IUP at 36w2d presents for MFM follow up.    Bilateral pulmonary embolism  Previously counseled  Appreciate Hematology's assistance  Chronic severe iron deficiency anemia  At our last visit, she was hesitant about starting Iron infusions due to cost. States she just paid off her last pregnancy's IV infusions from .  She is on PO iron and I have ordered a repeat CBC today  I contacted Dr. Whitehead and his staff. She has agreed to at least one iron infusion and in the meantime has also applied for Medicaid to ideally help cover the rest.  She was to have a repeat appt with Hematology however did not schedule- will have her see them if possible within the month and formulate a plan.  If Hgb not improved, will likely need a blood transfusion by 34-36 weeks in preparation for delivery.      Summary of Recommendations given her history:   Lovenox 40 BID due to BMI  Echo completed and within normal limits  Did not complete IV Iron. Discussed with " Hematology and Dr. Rodriguez. Received 2 units pRBCs inpatient in preparation for delivery. Hgb 8.4 post transfusion. Will likely require at least one additional unit prior to C section.  Follow up with Hematology now scheduled for March Continue PO Iron    Iron deficiency anemia  As above  Severe anemia      Radha Nguyen  Maternal-Fetal Medicine    Electronically Signed by Radha Nguyen February 3, 2025

## 2025-02-03 NOTE — ASSESSMENT & PLAN NOTE
Previously counseled  Appreciate Hematology's assistance  Chronic severe iron deficiency anemia  At our last visit, she was hesitant about starting Iron infusions due to cost. States she just paid off her last pregnancy's IV infusions from 2021.  She is on PO iron and I have ordered a repeat CBC today  I contacted Dr. Whitehead and his staff. She has agreed to at least one iron infusion and in the meantime has also applied for Medicaid to ideally help cover the rest.  She was to have a repeat appt with Hematology however did not schedule- will have her see them if possible within the month and formulate a plan.  If Hgb not improved, will likely need a blood transfusion by 34-36 weeks in preparation for delivery.      Summary of Recommendations given her history:   Lovenox 40 BID due to BMI  Echo completed and within normal limits  Did not complete IV Iron. Discussed with Hematology and Dr. Rodriguez. Received 2 units pRBCs inpatient in preparation for delivery. Hgb 8.4 post transfusion. Will likely require at least one additional unit prior to C section.  Follow up with Hematology now scheduled for March  Continue PO Iron

## 2025-02-06 ENCOUNTER — ROUTINE PRENATAL (OUTPATIENT)
Dept: OBSTETRICS AND GYNECOLOGY | Facility: CLINIC | Age: 25
End: 2025-02-06
Payer: COMMERCIAL

## 2025-02-06 VITALS — SYSTOLIC BLOOD PRESSURE: 110 MMHG | WEIGHT: 267 LBS | BODY MASS INDEX: 43.09 KG/M2 | DIASTOLIC BLOOD PRESSURE: 62 MMHG

## 2025-02-06 DIAGNOSIS — R82.90 ABNORMAL URINE FINDINGS: ICD-10-CM

## 2025-02-06 DIAGNOSIS — Z3A.36 36 WEEKS GESTATION OF PREGNANCY: Primary | ICD-10-CM

## 2025-02-06 LAB
BILIRUBIN, UA POC OHS: ABNORMAL
BLOOD, UA POC OHS: NEGATIVE
CLARITY, UA POC OHS: ABNORMAL
COLOR, UA POC OHS: ABNORMAL
GLUCOSE, UA POC OHS: 100
KETONES, UA POC OHS: NEGATIVE
LEUKOCYTES, UA POC OHS: ABNORMAL
NITRITE, UA POC OHS: NEGATIVE
PH, UA POC OHS: 6
PROTEIN, UA POC OHS: 100
SPECIFIC GRAVITY, UA POC OHS: >=1.03
UROBILINOGEN, UA POC OHS: 4

## 2025-02-06 PROCEDURE — 0502F SUBSEQUENT PRENATAL CARE: CPT | Mod: CPTII,S$GLB,, | Performed by: SPECIALIST

## 2025-02-06 PROCEDURE — 87086 URINE CULTURE/COLONY COUNT: CPT | Performed by: SPECIALIST

## 2025-02-06 PROCEDURE — 81003 URINALYSIS AUTO W/O SCOPE: CPT | Mod: QW,S$GLB,, | Performed by: SPECIALIST

## 2025-02-06 PROCEDURE — 99999 PR PBB SHADOW E&M-EST. PATIENT-LVL III: CPT | Mod: PBBFAC,,, | Performed by: SPECIALIST

## 2025-02-06 PROCEDURE — 59025 FETAL NON-STRESS TEST: CPT | Mod: S$GLB,,, | Performed by: SPECIALIST

## 2025-02-06 NOTE — PROGRESS NOTES
FETAL SURVEILLANCE TESTING SUMMARY  INDICATIONS:  Anemia  Previous   Prior PE     Fetal doppler heart rate tracing obtained in the usual fashion with the patient in the left supine position.     OBJECTIVE RESULTS:  Baseline fetal heart rate: 140     Fetal heart variability: present  Fetal Heart Rate decelerations:no  Fetal acoustic stimulator: no  Fetal Heart Rate accelerations: present  Fetal Non-stress Test: Reactive  Uterine irritability:no     Fetal surveillance: Reassuring  Complaints today:none , Good fetal movements reported No Bleeding or pains    /62   Wt 121.1 kg (266 lb 15.6 oz)   LMP 2024   BMI 43.09 kg/m²     24 y.o., at 36w5d by Estimated Date of Delivery: 3/1/25  Patient Active Problem List   Diagnosis    Iron deficiency anemia    Iron deficiency anemia due to chronic blood loss    Carpal tunnel syndrome, right    Anxiety and depression    Engorgement of breasts, postpartum condition or complication    Bilateral pulmonary embolism    Acute septic pulmonary embolism with acute cor pulmonale    Menorrhagia, premenopausal    Gallstones    Menorrhagia with irregular cycle    Anemia affecting pregnancy     OB History    Para Term  AB Living   3 1 1 0 1 1   SAB IAB Ectopic Multiple Live Births   1 0 0 0 1      # Outcome Date GA Lbr Jacobo/2nd Weight Sex Type Anes PTL Lv   3 Current            2 Term 21 39w0d  3.756 kg (8 lb 4.5 oz) M CS-LTranv Spinal  ANDRZEJ   1 SAB                Dating reviewed    Allergies and problem list reviewed and updated    Medical and surgical history reviewed    Prenatal labs reviewed and updated    Physical Exam:  ABD: soft, gravid, nontender,     Assessment:  IUP 36w5d  Previous   Previous PE  Sever anemia    Plan:   Continue weekly testing and daily kick counts  follow up 1 Weeks, bleeding/pain precautions   kick counts, labor precautions

## 2025-02-08 LAB
BACTERIA UR CULT: NORMAL
BACTERIA UR CULT: NORMAL

## 2025-02-13 ENCOUNTER — ROUTINE PRENATAL (OUTPATIENT)
Dept: OBSTETRICS AND GYNECOLOGY | Facility: CLINIC | Age: 25
End: 2025-02-13
Payer: COMMERCIAL

## 2025-02-13 VITALS
DIASTOLIC BLOOD PRESSURE: 68 MMHG | WEIGHT: 264.31 LBS | SYSTOLIC BLOOD PRESSURE: 108 MMHG | BODY MASS INDEX: 42.66 KG/M2

## 2025-02-13 DIAGNOSIS — Z3A.38 38 WEEKS GESTATION OF PREGNANCY: Primary | ICD-10-CM

## 2025-02-13 DIAGNOSIS — Z98.891 HISTORY OF C-SECTION: ICD-10-CM

## 2025-02-13 DIAGNOSIS — R82.90 ABNORMAL URINE FINDING: ICD-10-CM

## 2025-02-13 LAB
BILIRUBIN, UA POC OHS: ABNORMAL
BLOOD, UA POC OHS: NEGATIVE
CLARITY, UA POC OHS: ABNORMAL
COLOR, UA POC OHS: ABNORMAL
GLUCOSE, UA POC OHS: NEGATIVE
KETONES, UA POC OHS: ABNORMAL
LEUKOCYTES, UA POC OHS: ABNORMAL
NITRITE, UA POC OHS: NEGATIVE
PH, UA POC OHS: 6
PROTEIN, UA POC OHS: 30
SPECIFIC GRAVITY, UA POC OHS: >=1.03
UROBILINOGEN, UA POC OHS: 4

## 2025-02-13 PROCEDURE — 87086 URINE CULTURE/COLONY COUNT: CPT | Performed by: SPECIALIST

## 2025-02-13 PROCEDURE — 99999 PR PBB SHADOW E&M-EST. PATIENT-LVL III: CPT | Mod: PBBFAC,,, | Performed by: SPECIALIST

## 2025-02-13 NOTE — PROGRESS NOTES
FETAL SURVEILLANCE TESTING SUMMARY  INDICATIONS: IUP 37w5d, severe anemia     Fetal doppler heart rate tracing obtained in the usual fashion with the patient in the left supine position.     OBJECTIVE RESULTS:  Baseline fetal heart rate: 144     Fetal heart variability: present  Fetal Heart Rate decelerations:no  Fetal acoustic stimulator: no  Fetal Heart Rate accelerations: present  Fetal Non-stress Test: Reactive  Uterine irritability:no     Fetal surveillance: Reassuring  Complaints today:none , Good fetal movements reported No Bleeding or pains    /68   Wt 119.9 kg (264 lb 5.3 oz)   LMP 2024   BMI 42.66 kg/m²     24 y.o., at 37w5d by Estimated Date of Delivery: 3/1/25  Patient Active Problem List   Diagnosis    Iron deficiency anemia    Iron deficiency anemia due to chronic blood loss    Carpal tunnel syndrome, right    Anxiety and depression    Engorgement of breasts, postpartum condition or complication    Bilateral pulmonary embolism    Acute septic pulmonary embolism with acute cor pulmonale    Menorrhagia, premenopausal    Gallstones    Menorrhagia with irregular cycle    Anemia affecting pregnancy     OB History    Para Term  AB Living   3 1 1 0 1 1   SAB IAB Ectopic Multiple Live Births   1 0 0 0 1      # Outcome Date GA Lbr Jacobo/2nd Weight Sex Type Anes PTL Lv   3 Current            2 Term 21 39w0d  3.756 kg (8 lb 4.5 oz) M CS-LTranv Spinal  ANDRZEJ   1 SAB                Dating reviewed    Allergies and problem list reviewed and updated    Medical and surgical history reviewed    Prenatal labs reviewed and updated    Physical Exam:  ABD: soft, gravid, nontender,     Assessment:  IUP 37w5d  Previous   Previous PE    Plan:   Repeat   7am  Stop Lovenox   follow up [facility] 5am  , bleeding/pain precautions   kick counts, labor precautions

## 2025-02-15 LAB
BACTERIA UR CULT: NORMAL
BACTERIA UR CULT: NORMAL

## 2025-02-20 PROBLEM — O34.219 HISTORY OF CESAREAN DELIVERY, CURRENTLY PREGNANT: Status: ACTIVE | Noted: 2025-02-20

## 2025-04-07 ENCOUNTER — POSTPARTUM VISIT (OUTPATIENT)
Dept: OBSTETRICS AND GYNECOLOGY | Facility: CLINIC | Age: 25
End: 2025-04-07
Payer: COMMERCIAL

## 2025-04-07 VITALS
BODY MASS INDEX: 40.17 KG/M2 | DIASTOLIC BLOOD PRESSURE: 66 MMHG | SYSTOLIC BLOOD PRESSURE: 102 MMHG | WEIGHT: 248.88 LBS

## 2025-04-07 DIAGNOSIS — Z30.9 ENCOUNTER FOR CONTRACEPTIVE MANAGEMENT, UNSPECIFIED TYPE: ICD-10-CM

## 2025-04-07 PROCEDURE — 0503F POSTPARTUM CARE VISIT: CPT | Mod: CPTII,S$GLB,, | Performed by: SPECIALIST

## 2025-04-07 PROCEDURE — 99999 PR PBB SHADOW E&M-EST. PATIENT-LVL III: CPT | Mod: PBBFAC,,, | Performed by: SPECIALIST

## 2025-04-07 RX ORDER — NORETHINDRONE 0.35 MG/1
1 TABLET ORAL DAILY
Qty: 30 TABLET | Refills: 8 | Status: SHIPPED | OUTPATIENT
Start: 2025-04-07 | End: 2026-04-07

## 2025-04-07 NOTE — PROGRESS NOTES
25 yo Wf s/p repeat LT c-sction 24 presents for PpO eval Pt doing well and denies depression, pain, f/c, heavy vaginal bleeding, n/v  pt is breast and bottle feeding  Past Medical History:   Diagnosis Date    Encounter for blood transfusion     Iron deficiency anemia 2018    Post-partum depression     Pulmonary embolism     -from birth control       Past Surgical History:   Procedure Laterality Date     SECTION N/A 2021    Procedure:  SECTION;  Surgeon: Rafael Rodriguez MD;  Location: [facility] L&D;  Service: OB/GYN;  Laterality: N/A;     SECTION N/A 2025    Procedure:  SECTION;  Surgeon: Rafael Rodriguez MD;  Location: [facility] L&D;  Service: OB/GYN;  Laterality: N/A;    LAPAROSCOPIC CHOLECYSTECTOMY N/A 2023    Procedure: CHOLECYSTECTOMY, LAPAROSCOPIC;  Surgeon: Trav Thomson MD;  Location: [facility] OR;  Service: General;  Laterality: N/A;       Family History   Problem Relation Name Age of Onset    No Known Problems Mother      No Known Problems Father      Breast cancer Neg Hx      Ovarian cancer Neg Hx         Social History     Socioeconomic History    Marital status: Single   Tobacco Use    Smoking status: Former     Types: Vaping with nicotine    Smokeless tobacco: Never   Substance and Sexual Activity    Alcohol use: No    Drug use: No    Sexual activity: Yes     Partners: Male     Birth control/protection: None     Social Drivers of Health     Financial Resource Strain: Low Risk  (2025)    Overall Financial Resource Strain (CARDIA)     Difficulty of Paying Living Expenses: Not hard at all   Food Insecurity: No Food Insecurity (2025)    Hunger Vital Sign     Worried About Running Out of Food in the Last Year: Never true     Ran Out of Food in the Last Year: Never true   Transportation Needs: No Transportation Needs (2025)    PRAPARE - Transportation     Lack of Transportation (Medical): No     Lack of Transportation (Non-Medical): No    Physical Activity: Sufficiently Active (1/18/2024)    Exercise Vital Sign     Days of Exercise per Week: 4 days     Minutes of Exercise per Session: 60 min   Stress: No Stress Concern Present (2/20/2025)    [employer] [place] [employer] - Occupational Stress Questionnaire     Feeling of Stress : Not at all   Housing Stability: Low Risk  (2/20/2025)    Housing Stability Vital Sign     Unable to Pay for Housing in the Last Year: No     Homeless in the Last Year: No       Current Medications[1]    Review of patient's allergies indicates:  No Known Allergies    Review of System:   General: no chills, fever, night sweats, weight gain or weight loss  Psychological: no depression or suicidal ideation  Breasts: no new or changing breast lumps, nipple discharge or masses.  Respiratory: no cough, shortness of breath, or wheezing  Cardiovascular: no chest pain or dyspnea on exertion  Gastrointestinal: no abdominal pain, change in bowel habits, or black or bloody stools  Genito-Urinary: no incontinence, urinary frequency/urgency or vulvar/vaginal symptoms, pelvic pain or abnormal vaginal bleeding.  Musculoskeletal: no gait disturbance or muscular weakness     VSS  Appears well  Abd  soft, incision well healed no s/s infection     I discussed release of restrictions  In addition, discussed contraceptive options while breast feeding including Micronor, DMPA, Nexplanon and progestin IUD  After discussion, pt desires OCP  Dosing discussed with Sunday start  RTO 1/26/prn    I spent a total of 30 minutes on the day of the visit. This includes face to face time and non-face to face time preparing to see the patient (eg, review of tests), obtaining and/or reviewing separately obtained history, documenting clinical information in the electronic or other health record, independently interpreting results and communicating results to the patient/family/caregiver, or care coordinator.            [1]   No current outpatient  medications on file.     No current facility-administered medications for this visit.

## 2025-05-13 ENCOUNTER — PATIENT MESSAGE (OUTPATIENT)
Dept: OBSTETRICS AND GYNECOLOGY | Facility: CLINIC | Age: 25
End: 2025-05-13
Payer: COMMERCIAL

## 2025-05-23 ENCOUNTER — POSTPARTUM VISIT (OUTPATIENT)
Dept: OBSTETRICS AND GYNECOLOGY | Facility: CLINIC | Age: 25
End: 2025-05-23
Payer: COMMERCIAL

## 2025-05-23 VITALS
WEIGHT: 250.69 LBS | SYSTOLIC BLOOD PRESSURE: 120 MMHG | DIASTOLIC BLOOD PRESSURE: 62 MMHG | BODY MASS INDEX: 40.46 KG/M2

## 2025-05-23 PROCEDURE — 99999 PR PBB SHADOW E&M-EST. PATIENT-LVL III: CPT | Mod: PBBFAC,,, | Performed by: SPECIALIST

## 2025-05-23 NOTE — PROGRESS NOTES
25 yo WF s/p LT  presents for final PPPo eval No complaints today  Past Medical History:   Diagnosis Date    Encounter for blood transfusion     Iron deficiency anemia 2018    Post-partum depression     Pulmonary embolism     -from birth control       Past Surgical History:   Procedure Laterality Date     SECTION N/A 2021    Procedure:  SECTION;  Surgeon: Rafael Rodriguez MD;  Location: [facility] L&D;  Service: OB/GYN;  Laterality: N/A;     SECTION N/A 2025    Procedure:  SECTION;  Surgeon: Rafael Rodriguez MD;  Location: [facility] L&D;  Service: OB/GYN;  Laterality: N/A;    LAPAROSCOPIC CHOLECYSTECTOMY N/A 2023    Procedure: CHOLECYSTECTOMY, LAPAROSCOPIC;  Surgeon: Trav Thomson MD;  Location: [facility];  Service: General;  Laterality: N/A;       Family History   Problem Relation Name Age of Onset    No Known Problems Mother      No Known Problems Father      Breast cancer Neg Hx      Ovarian cancer Neg Hx         Social History     Socioeconomic History    Marital status: Single   Tobacco Use    Smoking status: Former     Types: Vaping with nicotine    Smokeless tobacco: Never   Substance and Sexual Activity    Alcohol use: No    Drug use: No    Sexual activity: Yes     Partners: Male     Birth control/protection: None     Social Drivers of Health     Financial Resource Strain: Low Risk  (2025)    Overall Financial Resource Strain (CARDIA)     Difficulty of Paying Living Expenses: Not hard at all   Food Insecurity: No Food Insecurity (2025)    Hunger Vital Sign     Worried About Running Out of Food in the Last Year: Never true     Ran Out of Food in the Last Year: Never true   Transportation Needs: No Transportation Needs (2025)    PRAPARE - Transportation     Lack of Transportation (Medical): No     Lack of Transportation (Non-Medical): No   Physical Activity: Sufficiently Active (2024)    Exercise Vital Sign     Days of Exercise  per Week: 4 days     Minutes of Exercise per Session: 60 min   Stress: No Stress Concern Present (2/20/2025)    [employer] - Occupational Stress Questionnaire     Feeling of Stress : Not at all   Housing Stability: Low Risk  (2/20/2025)    Housing Stability Vital Sign     Unable to Pay for Housing in the Last Year: No     Homeless in the Last Year: No       Current Medications[1]    Review of patient's allergies indicates:  No Known Allergies    Review of System:   General: no chills, fever, night sweats, weight gain or weight loss  Psychological: no depression or suicidal ideation  Breasts: no new or changing breast lumps, nipple discharge or masses.  Respiratory: no cough, shortness of breath, or wheezing  Cardiovascular: no chest pain or dyspnea on exertion  Gastrointestinal: no abdominal pain, change in bowel habits, or black or bloody stools  Genito-Urinary: no incontinence, urinary frequency/urgency or vulvar/vaginal symptoms, pelvic pain or abnormal vaginal bleeding.  Musculoskeletal: no gait disturbance or muscular weakness     Appears well  VSS  Abd  soft Incision well healed No crepitane erythema or D\/C  Discussed release of restrictions  RTO 1 year/prn    I spent a total of 30 minutes on the day of the visit. This includes face to face time and non-face to face time preparing to see the patient (eg, review of tests), obtaining and/or reviewing separately obtained history, documenting clinical information in the electronic or other health record, independently interpreting results and communicating results to the patient/family/caregiver, or care coordinator.            [1]   Current Outpatient Medications   Medication Sig Dispense Refill    norethindrone (MICRONOR) 0.35 mg tablet Take 1 tablet (0.35 mg total) by mouth once daily. (Patient not taking: Reported on 5/23/2025) 30 tablet 8     No current facility-administered medications for this visit.

## 2025-07-07 RX ORDER — NORETHINDRONE 0.35 MG/1
1 TABLET ORAL DAILY
Qty: 30 TABLET | Refills: 8 | Status: SHIPPED | OUTPATIENT
Start: 2025-07-07 | End: 2026-07-07

## (undated) DEVICE — SLEEVE SCD EXPRESS KNEE MEDIUM

## (undated) DEVICE — LINER SUCTION 3000CC

## (undated) DEVICE — TROCAR ENDOPATH XCEL 5X100MM

## (undated) DEVICE — BAG TISS RETRV MONARCH 10MM

## (undated) DEVICE — STRAP OR TABLE 5IN X 72IN

## (undated) DEVICE — SET TUBE PNEUMOCLEAR SE HI FLO

## (undated) DEVICE — CLOSURE SKIN STERI STRIP 1/2X4

## (undated) DEVICE — TROCAR KII BLLN 12MM 10CM

## (undated) DEVICE — APPLIER CLIP EPIX UNIV 5X34

## (undated) DEVICE — KIT ANTIFOG W/SPONG & FLUID

## (undated) DEVICE — APPLICATOR CHLORAPREP ORN 26ML

## (undated) DEVICE — SYR LUER LOCK STERILE 10ML

## (undated) DEVICE — TROCAR ENDO Z THREAD KII 5X100

## (undated) DEVICE — DISSECTOR CURVED 5DCD

## (undated) DEVICE — NDL SAFETY 21G X 1 1/2 ECLPSE

## (undated) DEVICE — SUT MONOCRYL 4-0 PS-2

## (undated) DEVICE — GOWN POLY REINF BRTH SLV XL

## (undated) DEVICE — ELECTRODE REM PLYHSV RETURN 9

## (undated) DEVICE — PACK CUSTOM ENDO CHOLO SLI

## (undated) DEVICE — TOWEL OR DISP STRL BLUE 4/PK

## (undated) DEVICE — GLOVE BIOGEL PIMICRO INDIC 6.5

## (undated) DEVICE — GLOVE SURG ULTRA TOUCH 6

## (undated) DEVICE — SOL WATER STRL IRR 1000ML

## (undated) DEVICE — SUT 0 VICRYL / UR6 (J603)

## (undated) DEVICE — GLOVE SURG ULTRA TOUCH 7.5

## (undated) DEVICE — SCISSOR 5MMX35CM DIRECT DRIVE